# Patient Record
Sex: MALE | Race: WHITE | NOT HISPANIC OR LATINO | Employment: OTHER | URBAN - METROPOLITAN AREA
[De-identification: names, ages, dates, MRNs, and addresses within clinical notes are randomized per-mention and may not be internally consistent; named-entity substitution may affect disease eponyms.]

---

## 2018-02-01 ENCOUNTER — APPOINTMENT (OUTPATIENT)
Dept: GENERAL RADIOLOGY | Facility: HOSPITAL | Age: 73
End: 2018-02-01

## 2018-02-01 ENCOUNTER — HOSPITAL ENCOUNTER (INPATIENT)
Facility: HOSPITAL | Age: 73
LOS: 3 days | Discharge: HOME | End: 2018-02-05
Attending: EMERGENCY MEDICINE | Admitting: INTERNAL MEDICINE

## 2018-02-01 DIAGNOSIS — M62.81 MUSCLE WEAKNESS (GENERALIZED): ICD-10-CM

## 2018-02-01 DIAGNOSIS — R05.9 COUGH: ICD-10-CM

## 2018-02-01 DIAGNOSIS — R53.1 GENERALIZED WEAKNESS: Primary | ICD-10-CM

## 2018-02-01 LAB
ALBUMIN SERPL-MCNC: 4.5 G/DL (ref 3.5–5.2)
ALBUMIN/GLOB SERPL: 1.8 G/DL
ALP SERPL-CCNC: 85 U/L (ref 39–117)
ALT SERPL W P-5'-P-CCNC: 14 U/L (ref 1–41)
ANION GAP SERPL CALCULATED.3IONS-SCNC: 8.9 MMOL/L
AST SERPL-CCNC: 28 U/L (ref 1–40)
BASOPHILS # BLD AUTO: 0.01 10*3/MM3 (ref 0–0.2)
BASOPHILS NFR BLD AUTO: 0.1 % (ref 0–1.5)
BILIRUB SERPL-MCNC: 0.9 MG/DL (ref 0.1–1.2)
BILIRUB UR QL STRIP: NEGATIVE
BUN BLD-MCNC: 15 MG/DL (ref 8–23)
BUN/CREAT SERPL: 12 (ref 7–25)
CALCIUM SPEC-SCNC: 9.3 MG/DL (ref 8.6–10.5)
CHLORIDE SERPL-SCNC: 99 MMOL/L (ref 98–107)
CLARITY UR: CLEAR
CO2 SERPL-SCNC: 32.1 MMOL/L (ref 22–29)
COLOR UR: YELLOW
CREAT BLD-MCNC: 1.25 MG/DL (ref 0.76–1.27)
DEPRECATED RDW RBC AUTO: 44.9 FL (ref 37–54)
EOSINOPHIL # BLD AUTO: 0.07 10*3/MM3 (ref 0–0.7)
EOSINOPHIL NFR BLD AUTO: 0.8 % (ref 0.3–6.2)
ERYTHROCYTE [DISTWIDTH] IN BLOOD BY AUTOMATED COUNT: 13 % (ref 11.5–14.5)
FLUAV AG NPH QL: NEGATIVE
FLUBV AG NPH QL IA: NEGATIVE
GFR SERPL CREATININE-BSD FRML MDRD: 57 ML/MIN/1.73
GLOBULIN UR ELPH-MCNC: 2.5 GM/DL
GLUCOSE BLD-MCNC: 99 MG/DL (ref 65–99)
GLUCOSE UR STRIP-MCNC: NEGATIVE MG/DL
HCT VFR BLD AUTO: 40 % (ref 40.4–52.2)
HGB BLD-MCNC: 13.8 G/DL (ref 13.7–17.6)
HGB UR QL STRIP.AUTO: NEGATIVE
IMM GRANULOCYTES # BLD: 0.02 10*3/MM3 (ref 0–0.03)
IMM GRANULOCYTES NFR BLD: 0.2 % (ref 0–0.5)
KETONES UR QL STRIP: NEGATIVE
LEUKOCYTE ESTERASE UR QL STRIP.AUTO: NEGATIVE
LYMPHOCYTES # BLD AUTO: 0.47 10*3/MM3 (ref 0.9–4.8)
LYMPHOCYTES NFR BLD AUTO: 5.1 % (ref 19.6–45.3)
MCH RBC QN AUTO: 32.9 PG (ref 27–32.7)
MCHC RBC AUTO-ENTMCNC: 34.5 G/DL (ref 32.6–36.4)
MCV RBC AUTO: 95.5 FL (ref 79.8–96.2)
MONOCYTES # BLD AUTO: 0.72 10*3/MM3 (ref 0.2–1.2)
MONOCYTES NFR BLD AUTO: 7.8 % (ref 5–12)
NEUTROPHILS # BLD AUTO: 8 10*3/MM3 (ref 1.9–8.1)
NEUTROPHILS NFR BLD AUTO: 86 % (ref 42.7–76)
NITRITE UR QL STRIP: NEGATIVE
PH UR STRIP.AUTO: 5.5 [PH] (ref 5–8)
PLATELET # BLD AUTO: 136 10*3/MM3 (ref 140–500)
PMV BLD AUTO: 9.5 FL (ref 6–12)
POTASSIUM BLD-SCNC: 3.3 MMOL/L (ref 3.5–5.2)
PROT SERPL-MCNC: 7 G/DL (ref 6–8.5)
PROT UR QL STRIP: NEGATIVE
RBC # BLD AUTO: 4.19 10*6/MM3 (ref 4.6–6)
S PYO AG THROAT QL: NEGATIVE
SODIUM BLD-SCNC: 140 MMOL/L (ref 136–145)
SP GR UR STRIP: 1.02 (ref 1–1.03)
UROBILINOGEN UR QL STRIP: NORMAL
WBC NRBC COR # BLD: 9.29 10*3/MM3 (ref 4.5–10.7)

## 2018-02-01 PROCEDURE — 71046 X-RAY EXAM CHEST 2 VIEWS: CPT

## 2018-02-01 PROCEDURE — 87880 STREP A ASSAY W/OPTIC: CPT | Performed by: PHYSICIAN ASSISTANT

## 2018-02-01 PROCEDURE — 87581 M.PNEUMON DNA AMP PROBE: CPT | Performed by: PHYSICIAN ASSISTANT

## 2018-02-01 PROCEDURE — 80053 COMPREHEN METABOLIC PANEL: CPT | Performed by: PHYSICIAN ASSISTANT

## 2018-02-01 PROCEDURE — 94640 AIRWAY INHALATION TREATMENT: CPT

## 2018-02-01 PROCEDURE — 87486 CHLMYD PNEUM DNA AMP PROBE: CPT | Performed by: PHYSICIAN ASSISTANT

## 2018-02-01 PROCEDURE — 85025 COMPLETE CBC W/AUTO DIFF WBC: CPT | Performed by: PHYSICIAN ASSISTANT

## 2018-02-01 PROCEDURE — 87633 RESP VIRUS 12-25 TARGETS: CPT | Performed by: PHYSICIAN ASSISTANT

## 2018-02-01 PROCEDURE — 87081 CULTURE SCREEN ONLY: CPT | Performed by: PHYSICIAN ASSISTANT

## 2018-02-01 PROCEDURE — 87798 DETECT AGENT NOS DNA AMP: CPT | Performed by: PHYSICIAN ASSISTANT

## 2018-02-01 PROCEDURE — 99285 EMERGENCY DEPT VISIT HI MDM: CPT

## 2018-02-01 PROCEDURE — G0378 HOSPITAL OBSERVATION PER HR: HCPCS

## 2018-02-01 PROCEDURE — 87804 INFLUENZA ASSAY W/OPTIC: CPT | Performed by: PHYSICIAN ASSISTANT

## 2018-02-01 PROCEDURE — 94799 UNLISTED PULMONARY SVC/PX: CPT

## 2018-02-01 PROCEDURE — 81003 URINALYSIS AUTO W/O SCOPE: CPT | Performed by: PHYSICIAN ASSISTANT

## 2018-02-01 RX ORDER — PRAMIPEXOLE DIHYDROCHLORIDE 0.75 MG/1
0.7 TABLET ORAL 3 TIMES DAILY
COMMUNITY

## 2018-02-01 RX ORDER — ATORVASTATIN CALCIUM 20 MG/1
20 TABLET, FILM COATED ORAL DAILY
COMMUNITY

## 2018-02-01 RX ORDER — VALSARTAN AND HYDROCHLOROTHIAZIDE 160; 12.5 MG/1; MG/1
1 TABLET, FILM COATED ORAL DAILY
COMMUNITY
End: 2021-11-15 | Stop reason: HOSPADM

## 2018-02-01 RX ORDER — ACETAMINOPHEN 325 MG/1
650 TABLET ORAL ONCE
Status: COMPLETED | OUTPATIENT
Start: 2018-02-01 | End: 2018-02-01

## 2018-02-01 RX ORDER — ASCORBIC ACID 500 MG
500 TABLET ORAL DAILY
COMMUNITY

## 2018-02-01 RX ORDER — ASPIRIN 81 MG/1
81 TABLET ORAL DAILY
COMMUNITY

## 2018-02-01 RX ORDER — OMEPRAZOLE 20 MG/1
20 CAPSULE, DELAYED RELEASE ORAL DAILY
COMMUNITY

## 2018-02-01 RX ORDER — RIVASTIGMINE TARTRATE 1.5 MG/1
6 CAPSULE ORAL ONCE
Status: COMPLETED | OUTPATIENT
Start: 2018-02-01 | End: 2018-02-01

## 2018-02-01 RX ORDER — RIVASTIGMINE TARTRATE 6 MG/1
6 CAPSULE ORAL 2 TIMES DAILY
COMMUNITY

## 2018-02-01 RX ORDER — ALBUTEROL SULFATE 2.5 MG/3ML
2.5 SOLUTION RESPIRATORY (INHALATION) ONCE
Status: COMPLETED | OUTPATIENT
Start: 2018-02-01 | End: 2018-02-01

## 2018-02-01 RX ORDER — MEMANTINE HYDROCHLORIDE 28 MG/1
10 CAPSULE, EXTENDED RELEASE ORAL 2 TIMES DAILY
COMMUNITY
End: 2022-01-14

## 2018-02-01 RX ORDER — PRAMIPEXOLE DIHYDROCHLORIDE 1 MG/1
1 TABLET ORAL ONCE
Status: COMPLETED | OUTPATIENT
Start: 2018-02-01 | End: 2018-02-01

## 2018-02-01 RX ADMIN — RIVASTIGMINE TARTRATE 6 MG: 1.5 CAPSULE ORAL at 22:20

## 2018-02-01 RX ADMIN — PRAMIPEXOLE DIHYDROCHLORIDE 1 MG: 1 TABLET ORAL at 22:19

## 2018-02-01 RX ADMIN — SODIUM CHLORIDE 1000 ML: 9 INJECTION, SOLUTION INTRAVENOUS at 20:18

## 2018-02-01 RX ADMIN — ALBUTEROL SULFATE 2.5 MG: 2.5 SOLUTION RESPIRATORY (INHALATION) at 22:14

## 2018-02-01 RX ADMIN — ACETAMINOPHEN 650 MG: 325 TABLET ORAL at 20:42

## 2018-02-02 PROBLEM — G20 PARKINSON'S DISEASE: Status: ACTIVE | Noted: 2018-02-02

## 2018-02-02 PROBLEM — R50.9 FEBRILE ILLNESS: Status: ACTIVE | Noted: 2018-02-02

## 2018-02-02 PROBLEM — I10 ESSENTIAL HYPERTENSION: Status: ACTIVE | Noted: 2018-02-02

## 2018-02-02 PROBLEM — J10.1 INFLUENZA A: Status: ACTIVE | Noted: 2018-02-02

## 2018-02-02 LAB
ANION GAP SERPL CALCULATED.3IONS-SCNC: 13.5 MMOL/L
B PERT DNA SPEC QL NAA+PROBE: NOT DETECTED
BASOPHILS # BLD AUTO: 0.01 10*3/MM3 (ref 0–0.2)
BASOPHILS NFR BLD AUTO: 0.2 % (ref 0–1.5)
BUN BLD-MCNC: 13 MG/DL (ref 8–23)
BUN/CREAT SERPL: 12 (ref 7–25)
C PNEUM DNA NPH QL NAA+NON-PROBE: NOT DETECTED
CALCIUM SPEC-SCNC: 8.4 MG/DL (ref 8.6–10.5)
CHLORIDE SERPL-SCNC: 101 MMOL/L (ref 98–107)
CO2 SERPL-SCNC: 25.5 MMOL/L (ref 22–29)
CREAT BLD-MCNC: 1.08 MG/DL (ref 0.76–1.27)
DEPRECATED RDW RBC AUTO: 44.8 FL (ref 37–54)
EOSINOPHIL # BLD AUTO: 0.03 10*3/MM3 (ref 0–0.7)
EOSINOPHIL NFR BLD AUTO: 0.5 % (ref 0.3–6.2)
ERYTHROCYTE [DISTWIDTH] IN BLOOD BY AUTOMATED COUNT: 12.9 % (ref 11.5–14.5)
FLUAV H1 2009 PAND RNA NPH QL NAA+PROBE: NOT DETECTED
FLUAV H1 HA GENE NPH QL NAA+PROBE: NOT DETECTED
FLUAV H3 RNA NPH QL NAA+PROBE: DETECTED
FLUAV SUBTYP SPEC NAA+PROBE: NOT DETECTED
FLUBV RNA ISLT QL NAA+PROBE: NOT DETECTED
GFR SERPL CREATININE-BSD FRML MDRD: 67 ML/MIN/1.73
GLUCOSE BLD-MCNC: 100 MG/DL (ref 65–99)
HADV DNA SPEC NAA+PROBE: NOT DETECTED
HCOV 229E RNA SPEC QL NAA+PROBE: NOT DETECTED
HCOV HKU1 RNA SPEC QL NAA+PROBE: NOT DETECTED
HCOV NL63 RNA SPEC QL NAA+PROBE: NOT DETECTED
HCOV OC43 RNA SPEC QL NAA+PROBE: NOT DETECTED
HCT VFR BLD AUTO: 37.2 % (ref 40.4–52.2)
HGB BLD-MCNC: 12.7 G/DL (ref 13.7–17.6)
HMPV RNA NPH QL NAA+NON-PROBE: NOT DETECTED
HPIV1 RNA SPEC QL NAA+PROBE: NOT DETECTED
HPIV2 RNA SPEC QL NAA+PROBE: NOT DETECTED
HPIV3 RNA NPH QL NAA+PROBE: NOT DETECTED
HPIV4 P GENE NPH QL NAA+PROBE: NOT DETECTED
IMM GRANULOCYTES # BLD: 0 10*3/MM3 (ref 0–0.03)
IMM GRANULOCYTES NFR BLD: 0 % (ref 0–0.5)
LYMPHOCYTES # BLD AUTO: 0.74 10*3/MM3 (ref 0.9–4.8)
LYMPHOCYTES NFR BLD AUTO: 12.3 % (ref 19.6–45.3)
M PNEUMO IGG SER IA-ACNC: NOT DETECTED
MAGNESIUM SERPL-MCNC: 1.8 MG/DL (ref 1.6–2.4)
MCH RBC QN AUTO: 32.7 PG (ref 27–32.7)
MCHC RBC AUTO-ENTMCNC: 34.1 G/DL (ref 32.6–36.4)
MCV RBC AUTO: 95.9 FL (ref 79.8–96.2)
MONOCYTES # BLD AUTO: 0.69 10*3/MM3 (ref 0.2–1.2)
MONOCYTES NFR BLD AUTO: 11.5 % (ref 5–12)
NEUTROPHILS # BLD AUTO: 4.54 10*3/MM3 (ref 1.9–8.1)
NEUTROPHILS NFR BLD AUTO: 75.5 % (ref 42.7–76)
PLATELET # BLD AUTO: 102 10*3/MM3 (ref 140–500)
PMV BLD AUTO: 9 FL (ref 6–12)
POTASSIUM BLD-SCNC: 3 MMOL/L (ref 3.5–5.2)
RBC # BLD AUTO: 3.88 10*6/MM3 (ref 4.6–6)
RHINOVIRUS RNA SPEC NAA+PROBE: NOT DETECTED
RSV RNA NPH QL NAA+NON-PROBE: NOT DETECTED
SODIUM BLD-SCNC: 140 MMOL/L (ref 136–145)
WBC NRBC COR # BLD: 6.01 10*3/MM3 (ref 4.5–10.7)

## 2018-02-02 PROCEDURE — 85025 COMPLETE CBC W/AUTO DIFF WBC: CPT | Performed by: INTERNAL MEDICINE

## 2018-02-02 PROCEDURE — 25010000002 ENOXAPARIN PER 10 MG: Performed by: INTERNAL MEDICINE

## 2018-02-02 PROCEDURE — 25810000003 SODIUM CHLORIDE 0.9 % WITH KCL 20 MEQ 20-0.9 MEQ/L-% SOLUTION: Performed by: INTERNAL MEDICINE

## 2018-02-02 PROCEDURE — 97161 PT EVAL LOW COMPLEX 20 MIN: CPT

## 2018-02-02 PROCEDURE — 80048 BASIC METABOLIC PNL TOTAL CA: CPT | Performed by: INTERNAL MEDICINE

## 2018-02-02 PROCEDURE — 97110 THERAPEUTIC EXERCISES: CPT

## 2018-02-02 PROCEDURE — 83735 ASSAY OF MAGNESIUM: CPT | Performed by: INTERNAL MEDICINE

## 2018-02-02 RX ORDER — ALBUTEROL SULFATE 2.5 MG/3ML
2.5 SOLUTION RESPIRATORY (INHALATION) EVERY 6 HOURS PRN
Status: DISCONTINUED | OUTPATIENT
Start: 2018-02-02 | End: 2018-02-05 | Stop reason: HOSPADM

## 2018-02-02 RX ORDER — OSELTAMIVIR PHOSPHATE 30 MG/1
30 CAPSULE ORAL EVERY 12 HOURS SCHEDULED
Status: DISCONTINUED | OUTPATIENT
Start: 2018-02-02 | End: 2018-02-03

## 2018-02-02 RX ORDER — MEMANTINE HYDROCHLORIDE 10 MG/1
10 TABLET ORAL EVERY 12 HOURS SCHEDULED
Status: DISCONTINUED | OUTPATIENT
Start: 2018-02-02 | End: 2018-02-05 | Stop reason: HOSPADM

## 2018-02-02 RX ORDER — ATORVASTATIN CALCIUM 20 MG/1
20 TABLET, FILM COATED ORAL DAILY
Status: DISCONTINUED | OUTPATIENT
Start: 2018-02-02 | End: 2018-02-05 | Stop reason: HOSPADM

## 2018-02-02 RX ORDER — MULTIPLE VITAMINS W/ MINERALS TAB 9MG-400MCG
1 TAB ORAL DAILY
Status: DISCONTINUED | OUTPATIENT
Start: 2018-02-02 | End: 2018-02-05 | Stop reason: HOSPADM

## 2018-02-02 RX ORDER — RIVASTIGMINE TARTRATE 3 MG/1
6 CAPSULE ORAL 2 TIMES DAILY WITH MEALS
Status: DISCONTINUED | OUTPATIENT
Start: 2018-02-02 | End: 2018-02-05 | Stop reason: HOSPADM

## 2018-02-02 RX ORDER — ONDANSETRON 4 MG/1
4 TABLET, ORALLY DISINTEGRATING ORAL EVERY 6 HOURS PRN
Status: DISCONTINUED | OUTPATIENT
Start: 2018-02-02 | End: 2018-02-05 | Stop reason: HOSPADM

## 2018-02-02 RX ORDER — SODIUM CHLORIDE AND POTASSIUM CHLORIDE 150; 900 MG/100ML; MG/100ML
75 INJECTION, SOLUTION INTRAVENOUS CONTINUOUS
Status: DISCONTINUED | OUTPATIENT
Start: 2018-02-02 | End: 2018-02-03

## 2018-02-02 RX ORDER — ASCORBIC ACID 500 MG
500 TABLET ORAL DAILY
Status: DISCONTINUED | OUTPATIENT
Start: 2018-02-02 | End: 2018-02-05 | Stop reason: HOSPADM

## 2018-02-02 RX ORDER — VALSARTAN 160 MG/1
160 TABLET ORAL
Status: DISCONTINUED | OUTPATIENT
Start: 2018-02-02 | End: 2018-02-05 | Stop reason: HOSPADM

## 2018-02-02 RX ORDER — ONDANSETRON 4 MG/1
4 TABLET, FILM COATED ORAL EVERY 6 HOURS PRN
Status: DISCONTINUED | OUTPATIENT
Start: 2018-02-02 | End: 2018-02-05 | Stop reason: HOSPADM

## 2018-02-02 RX ORDER — POTASSIUM CHLORIDE 750 MG/1
40 CAPSULE, EXTENDED RELEASE ORAL 2 TIMES DAILY WITH MEALS
Status: DISCONTINUED | OUTPATIENT
Start: 2018-02-02 | End: 2018-02-05 | Stop reason: HOSPADM

## 2018-02-02 RX ORDER — ASPIRIN 81 MG/1
81 TABLET ORAL DAILY
Status: DISCONTINUED | OUTPATIENT
Start: 2018-02-02 | End: 2018-02-05 | Stop reason: HOSPADM

## 2018-02-02 RX ORDER — SENNA AND DOCUSATE SODIUM 50; 8.6 MG/1; MG/1
2 TABLET, FILM COATED ORAL NIGHTLY PRN
Status: DISCONTINUED | OUTPATIENT
Start: 2018-02-02 | End: 2018-02-05 | Stop reason: HOSPADM

## 2018-02-02 RX ORDER — HYDROCODONE BITARTRATE AND ACETAMINOPHEN 5; 325 MG/1; MG/1
1 TABLET ORAL EVERY 4 HOURS PRN
Status: DISCONTINUED | OUTPATIENT
Start: 2018-02-02 | End: 2018-02-05 | Stop reason: HOSPADM

## 2018-02-02 RX ORDER — SODIUM CHLORIDE 0.9 % (FLUSH) 0.9 %
1-10 SYRINGE (ML) INJECTION AS NEEDED
Status: DISCONTINUED | OUTPATIENT
Start: 2018-02-02 | End: 2018-02-05 | Stop reason: HOSPADM

## 2018-02-02 RX ORDER — HYDROCHLOROTHIAZIDE 25 MG/1
12.5 TABLET ORAL DAILY
Status: DISCONTINUED | OUTPATIENT
Start: 2018-02-02 | End: 2018-02-05 | Stop reason: HOSPADM

## 2018-02-02 RX ORDER — PRAMIPEXOLE DIHYDROCHLORIDE 1 MG/1
1 TABLET ORAL 3 TIMES DAILY
Status: DISCONTINUED | OUTPATIENT
Start: 2018-02-02 | End: 2018-02-05 | Stop reason: HOSPADM

## 2018-02-02 RX ORDER — ONDANSETRON 2 MG/ML
4 INJECTION INTRAMUSCULAR; INTRAVENOUS EVERY 6 HOURS PRN
Status: DISCONTINUED | OUTPATIENT
Start: 2018-02-02 | End: 2018-02-05 | Stop reason: HOSPADM

## 2018-02-02 RX ORDER — ACETAMINOPHEN 325 MG/1
650 TABLET ORAL EVERY 4 HOURS PRN
Status: DISCONTINUED | OUTPATIENT
Start: 2018-02-02 | End: 2018-02-05 | Stop reason: HOSPADM

## 2018-02-02 RX ORDER — PANTOPRAZOLE SODIUM 40 MG/1
40 TABLET, DELAYED RELEASE ORAL EVERY MORNING
Status: DISCONTINUED | OUTPATIENT
Start: 2018-02-02 | End: 2018-02-05 | Stop reason: HOSPADM

## 2018-02-02 RX ORDER — VALSARTAN AND HYDROCHLOROTHIAZIDE 160; 12.5 MG/1; MG/1
1 TABLET, FILM COATED ORAL DAILY
Status: DISCONTINUED | OUTPATIENT
Start: 2018-02-02 | End: 2018-02-02 | Stop reason: CLARIF

## 2018-02-02 RX ADMIN — POTASSIUM CHLORIDE 40 MEQ: 750 CAPSULE, EXTENDED RELEASE ORAL at 19:10

## 2018-02-02 RX ADMIN — RIVASTIGMINE TARTRATE 6 MG: 3 CAPSULE ORAL at 17:19

## 2018-02-02 RX ADMIN — MULTIPLE VITAMINS W/ MINERALS TAB 1 TABLET: TAB at 08:40

## 2018-02-02 RX ADMIN — OSELTAMIVIR PHOSPHATE 30 MG: 30 CAPSULE ORAL at 12:18

## 2018-02-02 RX ADMIN — MEMANTINE HYDROCHLORIDE 10 MG: 10 TABLET, FILM COATED ORAL at 12:18

## 2018-02-02 RX ADMIN — RIVASTIGMINE TARTRATE 6 MG: 3 CAPSULE ORAL at 08:41

## 2018-02-02 RX ADMIN — OSELTAMIVIR PHOSPHATE 30 MG: 30 CAPSULE ORAL at 03:26

## 2018-02-02 RX ADMIN — MEMANTINE HYDROCHLORIDE 10 MG: 10 TABLET, FILM COATED ORAL at 03:25

## 2018-02-02 RX ADMIN — ASPIRIN 81 MG: 81 TABLET ORAL at 08:40

## 2018-02-02 RX ADMIN — ENOXAPARIN SODIUM 40 MG: 40 INJECTION SUBCUTANEOUS at 21:24

## 2018-02-02 RX ADMIN — OSELTAMIVIR PHOSPHATE 30 MG: 30 CAPSULE ORAL at 21:24

## 2018-02-02 RX ADMIN — POTASSIUM CHLORIDE 40 MEQ: 750 CAPSULE, EXTENDED RELEASE ORAL at 12:18

## 2018-02-02 RX ADMIN — ACETAMINOPHEN 650 MG: 325 TABLET, FILM COATED ORAL at 06:21

## 2018-02-02 RX ADMIN — PRAMIPEXOLE DIHYDROCHLORIDE 1 MG: 1 TABLET ORAL at 17:19

## 2018-02-02 RX ADMIN — POTASSIUM CHLORIDE AND SODIUM CHLORIDE 75 ML/HR: 900; 150 INJECTION, SOLUTION INTRAVENOUS at 15:31

## 2018-02-02 RX ADMIN — ENOXAPARIN SODIUM 40 MG: 40 INJECTION SUBCUTANEOUS at 03:25

## 2018-02-02 RX ADMIN — PRAMIPEXOLE DIHYDROCHLORIDE 1 MG: 1 TABLET ORAL at 08:40

## 2018-02-02 RX ADMIN — PANTOPRAZOLE SODIUM 40 MG: 40 TABLET, DELAYED RELEASE ORAL at 06:21

## 2018-02-02 RX ADMIN — MEMANTINE HYDROCHLORIDE 10 MG: 10 TABLET, FILM COATED ORAL at 21:24

## 2018-02-02 RX ADMIN — OXYCODONE HYDROCHLORIDE AND ACETAMINOPHEN 500 MG: 500 TABLET ORAL at 08:41

## 2018-02-02 RX ADMIN — HYDROCHLOROTHIAZIDE 12.5 MG: 25 TABLET ORAL at 08:40

## 2018-02-02 RX ADMIN — PRAMIPEXOLE DIHYDROCHLORIDE 1 MG: 1 TABLET ORAL at 21:24

## 2018-02-02 RX ADMIN — POTASSIUM CHLORIDE AND SODIUM CHLORIDE 75 ML/HR: 900; 150 INJECTION, SOLUTION INTRAVENOUS at 01:16

## 2018-02-02 RX ADMIN — VALSARTAN 160 MG: 160 TABLET ORAL at 08:40

## 2018-02-02 RX ADMIN — ATORVASTATIN CALCIUM 20 MG: 20 TABLET, FILM COATED ORAL at 08:40

## 2018-02-02 NOTE — H&P
Name: Rogelio Sher ADMIT: 2018   : 1945  PCP: Naeem Garcia MD    MRN: [id number] LOS: 0 days   AGE/SEX: 72 y.o. male  ROOM: P387/     Chief Complaint   Patient presents with   • Sore Throat   • Cough   • Fever       Subjective   Mr. Sher is a 72 y.o. male with a history of Parkinson's disease who presents to [facility] with worsening weakness and confusion from his AL. He was found to have fever in ER so has undergone infectious workup. He reports some sore/dry throat but no nasal congestion or dyspnea/cough. No CP or palpitations. No NVD or abdominal pain. No dysuria or difficulty urinating. His weakness is not focal and tremor is near baseline. Family was not in room to confirm his mental status baseline but nursing had dicussed with them and his mentation improved greatly after IVF bolus was given upon arrival.    History of Present Illness    Past Medical History:   Diagnosis Date   • Chorioretinitis     histoplasmosis left eye   • GERD (gastroesophageal reflux disease)    • Hyperlipidemia    • Hypertension    • Kidney stone    • Parkinson's disease      Past Surgical History:   Procedure Laterality Date   • CATARACT EXTRACTION, BILATERAL     • COLONOSCOPY     • KIDNEY STONE SURGERY     • TONSILLECTOMY       History reviewed. No pertinent family history.  Social History   Substance Use Topics   • Smoking status: Never Smoker   • Smokeless tobacco: Never Used   • Alcohol use Yes      Comment: 3 drinks weekly     Prescriptions Prior to Admission   Medication Sig Dispense Refill Last Dose   • aspirin 81 MG EC tablet Take 81 mg by mouth Daily.   2018 at Unknown time   • atorvastatin (LIPITOR) 20 MG tablet Take 20 mg by mouth Daily.   2018 at Unknown time   • memantine (NAMENDA XR) 28 MG capsule sustained-release 24 hr extended release capsule Take 28 mg by mouth Daily.   2018 at Unknown time   • Multiple Vitamins-Minerals (MULTIVITAL PO) Take 1 tablet by mouth  Daily.   2/1/2018 at Unknown time   • omeprazole (priLOSEC) 20 MG capsule Take 20 mg by mouth Daily.   2/1/2018 at Unknown time   • pramipexole (MIRAPEX) 1 MG tablet Take 1 mg by mouth 3 (Three) Times a Day.   2/1/2018 at 2220   • rivastigmine (EXELON) 6 MG capsule Take 6 mg by mouth 2 (Two) Times a Day.   2/1/2018 at 2220   • valsartan-hydrochlorothiazide (DIOVAN-HCT) 160-12.5 MG per tablet Take 1 tablet by mouth Daily.      • vitamin C (ASCORBIC ACID) 500 MG tablet Take 500 mg by mouth Daily.        Allergies:  No Known Allergies    Review of Systems   Constitutional: Negative for chills and fever.   HENT: Positive for sore throat. Negative for trouble swallowing.    Eyes: Negative for pain and visual disturbance.   Respiratory: Negative for cough, shortness of breath and wheezing.    Cardiovascular: Negative for chest pain, palpitations and leg swelling.   Gastrointestinal: Negative for constipation, diarrhea, nausea and vomiting.   Endocrine: Negative for cold intolerance and heat intolerance.   Genitourinary: Negative for difficulty urinating and dysuria.   Musculoskeletal: Negative for neck pain and neck stiffness.   Skin: Negative for pallor and rash.   Allergic/Immunologic: Negative for environmental allergies and food allergies.   Neurological: Negative for seizures and syncope.   Hematological: Negative for adenopathy.   Psychiatric/Behavioral: Positive for confusion. Negative for agitation.        Objective    Vital Signs  Temp:  [97.7 °F (36.5 °C)-100.3 °F (37.9 °C)] 97.7 °F (36.5 °C)  Heart Rate:  [103-116] 104  Resp:  [18-24] 24  BP: (131-170)/(71-99) 139/71  SpO2:  [91 %-96 %] 91 %  on   ;   O2 Device: room air  Body mass index is 33.42 kg/(m^2).    Physical Exam   Constitutional: He appears well-developed and well-nourished. No distress.   HENT:   Head: Normocephalic and atraumatic.   Nose: Nose normal.   Eyes: EOM are normal. Pupils are equal, round, and reactive to light. No scleral icterus.    Neck: Normal range of motion. Neck supple.   Cardiovascular: Normal rate, regular rhythm and intact distal pulses.    Pulmonary/Chest: Effort normal. He has no wheezes. He has no rales.   Abdominal: Soft. Bowel sounds are normal. He exhibits no distension. There is no tenderness.   Musculoskeletal: He exhibits no edema or tenderness.   Neurological: He is alert. He displays tremor. No cranial nerve deficit.   Skin: Skin is warm and dry. He is not diaphoretic.   Psychiatric: He has a normal mood and affect. His behavior is normal.   Nursing note and vitals reviewed.      Results Review:   I reviewed the patient's new clinical results. Reviewed imaging, agree with interpretation. Reviewed telemetry, sinus rhythm, poor baseline related to tremor. Reviewed prior records.    Lab Results (last 24 hours)     Procedure Component Value Units Date/Time    Influenza Antigen, Rapid - Swab, Nasopharynx [[id number]]  (Normal) Collected:  02/01/18 2016    Specimen:  Swab from Nasopharynx Updated:  02/01/18 2041     Influenza A Ag, EIA Negative     Influenza B Ag, EIA Negative    CBC & Differential [[id number]] Collected:  02/01/18 2016    Specimen:  Blood Updated:  02/01/18 2032    Narrative:       The following orders were created for panel order CBC & Differential.  Procedure                               Abnormality         Status                     ---------                               -----------         ------                     CBC Auto Differential[[id number]]         Abnormal            Final result                 Please view results for these tests on the individual orders.    Comprehensive Metabolic Panel [[id number]]  (Abnormal) Collected:  02/01/18 2016    Specimen:  Blood Updated:  02/01/18 2055     Glucose 99 mg/dL      BUN 15 mg/dL      Creatinine 1.25 mg/dL      Sodium 140 mmol/L      Potassium 3.3 (L) mmol/L      Chloride 99 mmol/L      CO2 32.1 (H) mmol/L      Calcium 9.3 mg/dL      Total Protein 7.0 g/dL       Albumin 4.50 g/dL      ALT (SGPT) 14 U/L      AST (SGOT) 28 U/L      Alkaline Phosphatase 85 U/L      Total Bilirubin 0.9 mg/dL      eGFR Non African Amer 57 (L) mL/min/1.73      Globulin 2.5 gm/dL      A/G Ratio 1.8 g/dL      BUN/Creatinine Ratio 12.0     Anion Gap 8.9 mmol/L     Narrative:       The MDRD GFR formula is only valid for adults with stable renal function between ages 18 and 70.    CBC Auto Differential [[id number]]  (Abnormal) Collected:  02/01/18 2016    Specimen:  Blood Updated:  02/01/18 2032     WBC 9.29 10*3/mm3      RBC 4.19 (L) 10*6/mm3      Hemoglobin 13.8 g/dL      Hematocrit 40.0 (L) %      MCV 95.5 fL      MCH 32.9 (H) pg      MCHC 34.5 g/dL      RDW 13.0 %      RDW-SD 44.9 fl      MPV 9.5 fL      Platelets 136 (L) 10*3/mm3      Neutrophil % 86.0 (H) %      Lymphocyte % 5.1 (L) %      Monocyte % 7.8 %      Eosinophil % 0.8 %      Basophil % 0.1 %      Immature Grans % 0.2 %      Neutrophils, Absolute 8.00 10*3/mm3      Lymphocytes, Absolute 0.47 (L) 10*3/mm3      Monocytes, Absolute 0.72 10*3/mm3      Eosinophils, Absolute 0.07 10*3/mm3      Basophils, Absolute 0.01 10*3/mm3      Immature Grans, Absolute 0.02 10*3/mm3     Respiratory Panel, PCR - Swab, Nasopharynx [[id number]]  (Abnormal) Collected:  02/01/18 2016    Specimen:  Swab from Nasopharynx Updated:  02/02/18 0018     ADENOVIRUS, PCR Not Detected     Coronavirus 229E Not Detected     Coronavirus HKU1 Not Detected     Coronavirus NL63 Not Detected     Coronavirus OC43 Not Detected     Human Metapneumovirus Not Detected     Human Rhinovirus/Enterovirus Not Detected     Influenza B PCR Not Detected     Parainfluenza Virus 1 Not Detected     Parainfluenza Virus 2 Not Detected     Parainfluenza Virus 3 Not Detected     Parainfluenza Virus 4 Not Detected     Bordetella pertussis pcr Not Detected     Influenza A H1N1 2009 PCR Not Detected     Chlamydophila pneumoniae PCR Not Detected     Mycoplasma pneumo by PCR Not Detected     Influenza A  PCR Not Detected     Influenza A H3 Detected (A)     Influenza A H1 Not Detected     RSV, PCR Not Detected    Rapid Strep A Screen - Swab, Throat [[id number]]  (Normal) Collected:  02/01/18 2017    Specimen:  Swab from Throat Updated:  02/01/18 2041     Strep A Ag Negative    Beta Strep Culture, Throat - Swab, Throat [[id number]] Collected:  02/01/18 2017    Specimen:  Swab from Throat Updated:  02/01/18 2041    Urinalysis With / Culture If Indicated - Urine, Clean Catch [[id number]]  (Normal) Collected:  02/01/18 2122    Specimen:  Urine from Urine, Clean Catch Updated:  02/01/18 2131     Color, UA Yellow     Appearance, UA Clear     pH, UA 5.5     Specific Gravity, UA 1.022     Glucose, UA Negative     Ketones, UA Negative     Bilirubin, UA Negative     Blood, UA Negative     Protein, UA Negative     Leuk Esterase, UA Negative     Nitrite, UA Negative     Urobilinogen, UA 1.0 E.U./dL    Narrative:       Urine microscopic not indicated.          XR Chest 2 View   Final Result        Assessment/Plan   Active Hospital Problems (** Indicates Principal Problem)    Diagnosis Date Noted   • **Influenza A [J10.1] 02/02/2018   • Parkinson's disease [G20] 02/02/2018   • Essential hypertension [I10] 02/02/2018   • Generalized weakness [R53.1] 02/01/2018      Resolved Hospital Problems    Diagnosis Date Noted Date Resolved   No resolved problems to display.     - Influenza A: Febrile illness from this with reported sore throat. Infectious workup is pending but respiratory status is not compromised at this time. Will give Tamiflu and PRN albuterol. Continue IVF with addition of potassium. Check labs in morning.  - Parkinson's Dementia: Mentation is returning to baseline. Metabolic encephalopathy from Influenza and dehydration. Continue home regimen.  - Weakness: PT consult.  - PPx Lovenox    I discussed the patients findings and my recommendations with patient, nursing staff and consulting provider.  Patient seen and examined prior  to midnight on 02/01. Note delayed by other patient care.    Nehemias Sheriff MD  [facility]  02/02/18  12:37 AM

## 2018-02-02 NOTE — THERAPY EVALUATION
Acute Care - Physical Therapy Initial Evaluation  [facility]     Patient Name: Rogelio Sher  : 1945  MRN: [id number]  Today's Date: 2018   Onset of Illness/Injury or Date of Surgery Date: 18  Date of Referral to PT: 18  Referring Physician: Nehemias Bowen      Admit Date: 2018     Visit Dx:    ICD-10-CM ICD-9-CM   1. Generalized weakness R53.1 780.79   2. Cough R05 786.2   3. Muscle weakness (generalized) M62.81 728.87     Patient Active Problem List   Diagnosis   • Generalized weakness   • Parkinson's disease   • Essential hypertension   • Influenza A     Past Medical History:   Diagnosis Date   • Chorioretinitis     histoplasmosis left eye   • GERD (gastroesophageal reflux disease)    • Hyperlipidemia    • Hypertension    • Kidney stone    • Parkinson's disease      Past Surgical History:   Procedure Laterality Date   • CATARACT EXTRACTION, BILATERAL     • COLONOSCOPY     • KIDNEY STONE SURGERY     • TONSILLECTOMY            PT ASSESSMENT (last 72 hours)      PT Evaluation       18 1332 18 0200    Rehab Evaluation    Document Type evaluation  -MS     Subjective Information agree to therapy;complains of;fatigue  -MS     Patient Effort, Rehab Treatment good  -MS     Symptoms Noted Comment Pt. reports fatigue, weakness with upright mobility.    -MS     General Information    Onset of Illness/Injury or Date of Surgery Date 18  -MS     Referring Physician Nehemias Bowen  -MS     General Observations Upon entering room, noticed that pt.'s bed was wet from I.V. fluid. Pt.'s I.V. connection was disconnected and under patient while he was in bed. Nursing (Sharon) notified.  -MS     Pertinent History Of Current Problem Pt. admitted with Influenza  -MS     Precautions/Limitations fall precautions   Exit alarm;  Droplet Isolation  -MS     Prior Level of Function independent:  -MS     Equipment Currently Used at Home none  -MS grab bar  -ROSE    Plans/Goals Discussed  With patient;agreed upon  -MS     Risks Reviewed patient:  -MS     Benefits Reviewed patient:  -MS     Barriers to Rehab cognitive status  -MS     Living Environment    Lives With  facility resident  -ROSE    Living Arrangements assisted living  -MS residential facility  -ROSE    Home Accessibility  no concerns  -ROSE    Stair Railings at Home  none  -ROSE    Type of Financial/Environmental Concern  none  -ROSE    Transportation Available  family or friend will provide  -ROSE    Clinical Impression    Date of Referral to PT 02/02/18  -MS     Criteria for Skilled Therapeutic Interventions Met treatment indicated  -MS     Rehab Potential good, to achieve stated therapy goals  -MS     Pain Assessment    Pain Assessment No/denies pain   No verbal/visual signs of pain  -MS     Cognitive Assessment/Intervention    Orientation Status oriented to;person   Needs orientation to place  -MS     Follows Commands/Answers Questions 100% of the time;able to follow single-step instructions;needs cueing  -MS     Personal Safety Interventions fall prevention program maintained;gait belt;nonskid shoes/slippers when out of bed;supervised activity  -MS     ROM (Range of Motion)    General ROM no range of motion deficits identified  -MS     MMT (Manual Muscle Testing)    General MMT Assessment --   BUE/LE MMT (4-/5)  -MS     Bed Mobility, Assessment/Treatment    Bed Mob, Supine to Sit, Aldrich minimum assist (75% patient effort)  -MS     Transfer Assessment/Treatment    Transfers, Sit-Stand Aldrich contact guard assist  -MS     Transfers, Stand-Sit Aldrich contact guard assist  -MS     Transfer, Comment Performed x 2 for functional strength training.  -MS     Gait Assessment/Treatment    Gait, Aldrich Level contact guard assist  -MS     Gait, Distance (Feet) 200  -MS     Gait, Gait Deviations forward flexed posture;odalys decreased  -MS     Gait, Safety Issues balance decreased during turns  -MS     Gait, Comment Verbal/tactile  cues for posture correction.  -MS     Therapy Exercises    Bilateral Lower Extremities AROM:;5 reps;sitting;ankle pumps/circles;hip flexion;LAQ  -MS     Positioning and Restraints    Pre-Treatment Position in bed  -MS     Post Treatment Position chair  -MS     In Chair notified nsg;sitting;call light within reach;encouraged to call for assist;exit alarm on;with nsg   V.S.S.  -MS       02/02/18 0120       Muscle Tone Assessment    Muscle Tone Assessment Bilateral Upper Extremities;Bilateral Lower Extremities  -ROSE     Bilateral Upper Extremities Muscle Tone Assessment mildly decreased tone  -ROSE     Bilateral Lower Extremities Muscle Tone Assessment moderately decreased tone  -ROSE       User Key  (r) = Recorded By, (t) = Taken By, (c) = Cosigned By    Initials Name Provider Type    MS Isaac Atkinson, PT Physical Therapist    ROSE Norris RN Registered Nurse          Physical Therapy Education     Title: PT OT SLP Therapies (Done)     Topic: Physical Therapy (Done)     Point: Mobility training (Done)    Learning Progress Summary    Learner Readiness Method Response Comment Documented by Status   Patient Acceptance CELINE ROGERS NR  MS 02/02/18 1339 Done               Point: Home exercise program (Done)    Learning Progress Summary    Learner Readiness Method Response Comment Documented by Status   Patient Acceptance CELINE ROGERS NR  MS 02/02/18 1339 Done               Point: Body mechanics (Done)    Learning Progress Summary    Learner Readiness Method Response Comment Documented by Status   Patient Acceptance CELINE ROGERS NR  MS 02/02/18 1339 Done               Point: Precautions (Done)    Learning Progress Summary    Learner Readiness Method Response Comment Documented by Status   Patient Acceptance CELINE ROGERS NR  MS 02/02/18 1339 Done                      User Key     Initials Effective Dates Name Provider Type Discipline    MS 12/01/15 -  Isaac Atkinson, PT Physical Therapist PT                PT Recommendation and  Plan  Anticipated Discharge Disposition: home with assist, home with home health  Planned Therapy Interventions: balance training, bed mobility training, gait training, home exercise program, patient/family education, postural re-education, strengthening, transfer training  PT Frequency: daily  Plan of Care Review  Plan Of Care Reviewed With: patient  Outcome Summary/Follow up Plan: Pt. will benefit from skilled inpt. P.T. to address his functional deficits and to assist pt. in regaining his independence with functional mobility.          IP PT Goals       02/02/18 1339          Bed Mobility PT LTG    Bed Mobility PT LTG, Date Established 02/02/18  -MS      Bed Mobility PT LTG, Time to Achieve 5 - 7 days  -MS      Bed Mobility PT LTG, Activity Type all bed mobility  -MS      Bed Mobility PT LTG, Wilmot Level independent  -MS      Transfer Training PT LTG    Transfer Training PT LTG, Date Established 02/02/18  -MS      Transfer Training PT LTG, Time to Achieve 5 - 7 days  -MS      Transfer Training PT LTG, Activity Type all transfers  -MS      Transfer Training PT LTG, Wilmot Level independent  -MS      Gait Training PT LTG    Gait Training Goal PT LTG, Date Established 02/02/18  -MS      Gait Training Goal PT LTG, Time to Achieve 5 - 7 days  -MS      Gait Training Goal PT LTG, Wilmot Level independent  -MS      Gait Training Goal PT LTG, Distance to Achieve 300 feet  -MS        User Key  (r) = Recorded By, (t) = Taken By, (c) = Cosigned By    Initials Name Provider Type    MS Isaac RUVALCABA Demetrio, PT Physical Therapist                Outcome Measures       02/02/18 1300          How much help from another person do you currently need...    Turning from your back to your side while in flat bed without using bedrails? 3  -MS      Moving from lying on back to sitting on the side of a flat bed without bedrails? 3  -MS      Moving to and from a bed to a chair (including a wheelchair)? 3  -MS      Standing  up from a chair using your arms (e.g., wheelchair, bedside chair)? 3  -MS      Climbing 3-5 steps with a railing? 3  -MS      To walk in hospital room? 3  -MS      AM-PAC 6 Clicks Score 18  -MS      Functional Assessment    Outcome Measure Options AM-PAC 6 Clicks Basic Mobility (PT)  -MS        User Key  (r) = Recorded By, (t) = Taken By, (c) = Cosigned By    Initials Name Provider Type    MS Isaac Atkinson, PT Physical Therapist           Time Calculation:         PT Charges       02/02/18 1341          Time Calculation    Start Time 1315  -MS      Stop Time 1330  -MS      Time Calculation (min) 15 min  -MS      PT Received On 02/02/18  -MS      PT - Next Appointment 02/03/18  -MS      PT Goal Re-Cert Due Date 02/09/18  -MS        User Key  (r) = Recorded By, (t) = Taken By, (c) = Cosigned By    Initials Name Provider Type    MS Isaac Atkinson, PT Physical Therapist          Therapy Charges for Today     Code Description Service Date Service Provider Modifiers Qty    18056242153 HC PT EVAL LOW COMPLEXITY 1 2/2/2018 Isaac Atkinson, PT GP 1    78706305062 HC PT THER PROC EA 15 MIN 2/2/2018 Isaac Atkinson, PT GP 1          PT G-Codes  Outcome Measure Options: AM-PAC 6 Clicks Basic Mobility (PT)      Isaac Atkinson, PT  2/2/2018

## 2018-02-02 NOTE — PROGRESS NOTES
Discharge Planning Assessment  [facility]     Patient Name: Rogelio Sher  MRN: [id number]  Today's Date: 2/2/2018    Admit Date: 2/1/2018          Discharge Needs Assessment       02/02/18 1359    Living Environment    Lives With facility resident    Living Arrangements assisted living    Stair Railings at Home none    Type of Financial/Environmental Concern none    Transportation Available family or friend will provide    Living Environment    Provides Primary Care For no one    Quality Of Family Relationships supportive    Able to Return to Prior Living Arrangements yes    Discharge Needs Assessment    Concerns To Be Addressed no discharge needs identified    Anticipated Changes Related to Illness none    Equipment Currently Used at Home none    Equipment Needed After Discharge none    Discharge Disposition still a patient            Discharge Plan       02/02/18 1405    Case Management/Social Work Plan    Plan Return to [place] to assisted living apartment  MARLY Davis his daughter [phone]    Additional Comments IMM   02/02   Spoke to patient.  I introduced myself and explained CCP role.  Verified face sheet.  unable to confirm patient pharmacy.  He states he uses [facility].  Pt lives in assisted living apartment alone.  Pt needs assistance with some ADL's.  Called and message left for his daughter/MARLY Davis, [phone] to verify plan to return to [place] and to confirm pharmacy.  He has no history of Home Health.  He has no rehab history.  Plan is home with no identified needs.  CCP following.          Discharge Placement     No information found                Demographic Summary     None            Functional Status       02/02/18 1359    Functional Status Current    Ambulation 2-->assistive person    Transferring 2-->assistive person    Toileting 2-->assistive person    Bathing 2-->assistive person    Dressing 2-->assistive person    Eating 2-->assistive person    Communication  0-->understands/communicates without difficulty    Functional Status Prior    Ambulation 2-->assistive person    Transferring 2-->assistive person    Toileting 2-->assistive person    Bathing 2-->assistive person    Dressing 2-->assistive person    Eating 0-->independent            Psychosocial     None            Abuse/Neglect     None            Legal     None            Substance Abuse     None            Patient Forms     None          Mihaela Bush, RN

## 2018-02-02 NOTE — ED PROVIDER NOTES
EMERGENCY DEPARTMENT ENCOUNTER    CHIEF COMPLAINT  Chief Complaint: flu symptoms  History given by: patient's daughter  History limited by: nothing  Room Number: P387/1  PMD: Naeem Garcia MD      HPI:  Pt is a 72 y.o. male who presents with flu symptoms including sore throat, tachycardia, and cough. Pt's symptoms began earlier today with a cough. About two hours ago, Pt's senior living facility called and stated that he had a subjective fever, weakness, headache, sore throat, and his cough had worsened. Pt's daughter stated that she instructed the facility to bring him here for evaluation. Pt's daughter states that he is a little more confused than his baseline.    Duration:  One day  Timing: constant  Location: n/a  Radiation: n/a  Quality: n/a  Intensity/Severity: moderate  Progression: worsening  Associated Symptoms: cough, sore throat, tachycardia, cough, weakness, headache  Aggravating Factors: none  Alleviating Factors: none  Previous Episodes: none  Treatment before arrival: none    MEDICAL RECORD REVIEW  Pt lives in assisted living. He is not a DNR. He has a h/x of HTN, parkinson's and GERD.     PAST MEDICAL HISTORY  Active Ambulatory Problems     Diagnosis Date Noted   • No Active Ambulatory Problems     Resolved Ambulatory Problems     Diagnosis Date Noted   • No Resolved Ambulatory Problems     Past Medical History:   Diagnosis Date   • Chorioretinitis    • GERD (gastroesophageal reflux disease)    • Hyperlipidemia    • Hypertension    • Kidney stone    • Parkinson's disease        PAST SURGICAL HISTORY  Past Surgical History:   Procedure Laterality Date   • CATARACT EXTRACTION, BILATERAL     • COLONOSCOPY     • KIDNEY STONE SURGERY     • TONSILLECTOMY         FAMILY HISTORY  History reviewed. No pertinent family history.    SOCIAL HISTORY  Social History     Social History   • Marital status:      Spouse name: N/A   • Number of children: N/A   • Years of education: N/A     Occupational History    • Not on file.     Social History Main Topics   • Smoking status: Never Smoker   • Smokeless tobacco: Never Used   • Alcohol use Yes      Comment: 3 drinks weekly   • Drug use: No   • Sexual activity: Not on file     Other Topics Concern   • Not on file     Social History Narrative   • No narrative on file       ALLERGIES  Review of patient's allergies indicates no known allergies.    REVIEW OF SYSTEMS  Review of Systems   Constitutional: Positive for fever. Negative for activity change and appetite change.   HENT: Positive for sore throat. Negative for congestion.    Eyes: Negative.    Respiratory: Positive for cough. Negative for shortness of breath.    Cardiovascular: Negative for chest pain and leg swelling.        Tachycardia   Gastrointestinal: Negative for abdominal pain, diarrhea and vomiting.   Endocrine: Negative.    Genitourinary: Negative for decreased urine volume and dysuria.   Musculoskeletal: Negative for neck pain.   Skin: Negative for rash and wound.   Allergic/Immunologic: Negative.    Neurological: Positive for weakness and headaches. Negative for numbness.   Hematological: Negative.    Psychiatric/Behavioral: Positive for confusion (mildly increased from baseline).   All other systems reviewed and are negative.      PHYSICAL EXAM  ED Triage Vitals   Temp Heart Rate Resp BP SpO2   02/01/18 1855 02/01/18 1855 02/01/18 1855 02/01/18 1855 02/01/18 1855   100.1 °F (37.8 °C) 116 18 154/99 96 %      Temp src Heart Rate Source Patient Position BP Location FiO2 (%)   02/01/18 1855 02/01/18 1855 -- -- --   Oral Monitor          Physical Exam   Constitutional: He is oriented to person, place, and time and well-developed, well-nourished, and in no distress. No distress.   Pt has fine tremor at baseline. He answers questions with slight confusion and needs to be re-directed.    HENT:   Head: Normocephalic and atraumatic.   Mouth/Throat: Oropharynx is clear and moist.   Eyes: EOM are normal. Pupils are  equal, round, and reactive to light.   Neck: Normal range of motion. Neck supple.   Cardiovascular: Regular rhythm and normal heart sounds.  Tachycardia present.    Pulmonary/Chest: Effort normal. No respiratory distress. He has decreased breath sounds (coarse) in the right upper field, the right middle field and the right lower field. He has no wheezes. He exhibits no tenderness.   Abdominal: Soft. He exhibits no distension. There is no tenderness. There is no rebound and no guarding.   Musculoskeletal: Normal range of motion. He exhibits no edema.   Lymphadenopathy:     He has no cervical adenopathy.   Neurological: He is alert and oriented to person, place, and time.   Skin: Skin is warm and dry. No rash noted. No pallor.   Psychiatric: Mood, memory, affect and judgment normal.   Nursing note and vitals reviewed.      LAB RESULTS  Recent Results (from the past 24 hour(s))   Influenza Antigen, Rapid - Swab, Nasopharynx    Collection Time: 02/01/18  8:16 PM   Result Value Ref Range    Influenza A Ag, EIA Negative Negative    Influenza B Ag, EIA Negative Negative   Comprehensive Metabolic Panel    Collection Time: 02/01/18  8:16 PM   Result Value Ref Range    Glucose 99 65 - 99 mg/dL    BUN 15 8 - 23 mg/dL    Creatinine 1.25 0.76 - 1.27 mg/dL    Sodium 140 136 - 145 mmol/L    Potassium 3.3 (L) 3.5 - 5.2 mmol/L    Chloride 99 98 - 107 mmol/L    CO2 32.1 (H) 22.0 - 29.0 mmol/L    Calcium 9.3 8.6 - 10.5 mg/dL    Total Protein 7.0 6.0 - 8.5 g/dL    Albumin 4.50 3.50 - 5.20 g/dL    ALT (SGPT) 14 1 - 41 U/L    AST (SGOT) 28 1 - 40 U/L    Alkaline Phosphatase 85 39 - 117 U/L    Total Bilirubin 0.9 0.1 - 1.2 mg/dL    eGFR Non African Amer 57 (L) >60 mL/min/1.73    Globulin 2.5 gm/dL    A/G Ratio 1.8 g/dL    BUN/Creatinine Ratio 12.0 7.0 - 25.0    Anion Gap 8.9 mmol/L   CBC Auto Differential    Collection Time: 02/01/18  8:16 PM   Result Value Ref Range    WBC 9.29 4.50 - 10.70 10*3/mm3    RBC 4.19 (L) 4.60 - 6.00 10*6/mm3     Hemoglobin 13.8 13.7 - 17.6 g/dL    Hematocrit 40.0 (L) 40.4 - 52.2 %    MCV 95.5 79.8 - 96.2 fL    MCH 32.9 (H) 27.0 - 32.7 pg    MCHC 34.5 32.6 - 36.4 g/dL    RDW 13.0 11.5 - 14.5 %    RDW-SD 44.9 37.0 - 54.0 fl    MPV 9.5 6.0 - 12.0 fL    Platelets 136 (L) 140 - 500 10*3/mm3    Neutrophil % 86.0 (H) 42.7 - 76.0 %    Lymphocyte % 5.1 (L) 19.6 - 45.3 %    Monocyte % 7.8 5.0 - 12.0 %    Eosinophil % 0.8 0.3 - 6.2 %    Basophil % 0.1 0.0 - 1.5 %    Immature Grans % 0.2 0.0 - 0.5 %    Neutrophils, Absolute 8.00 1.90 - 8.10 10*3/mm3    Lymphocytes, Absolute 0.47 (L) 0.90 - 4.80 10*3/mm3    Monocytes, Absolute 0.72 0.20 - 1.20 10*3/mm3    Eosinophils, Absolute 0.07 0.00 - 0.70 10*3/mm3    Basophils, Absolute 0.01 0.00 - 0.20 10*3/mm3    Immature Grans, Absolute 0.02 0.00 - 0.03 10*3/mm3   Respiratory Panel, PCR - Swab, Nasopharynx    Collection Time: 02/01/18  8:16 PM   Result Value Ref Range    ADENOVIRUS, PCR Not Detected Not Detected    Coronavirus 229E Not Detected Not Detected    Coronavirus HKU1 Not Detected Not Detected    Coronavirus NL63 Not Detected Not Detected    Coronavirus OC43 Not Detected Not Detected    Human Metapneumovirus Not Detected Not Detected    Human Rhinovirus/Enterovirus Not Detected Not Detected    Influenza B PCR Not Detected Not Detected    Parainfluenza Virus 1 Not Detected Not Detected    Parainfluenza Virus 2 Not Detected Not Detected    Parainfluenza Virus 3 Not Detected Not Detected    Parainfluenza Virus 4 Not Detected Not Detected    Bordetella pertussis pcr Not Detected Not Detected    Influenza A H1N1 2009 PCR Not Detected Not Detected    Chlamydophila pneumoniae PCR Not Detected Not Detected    Mycoplasma pneumo by PCR Not Detected Not Detected    Influenza A PCR Not Detected Not Detected    Influenza A H3 Detected (A) Not Detected    Influenza A H1 Not Detected Not Detected    RSV, PCR Not Detected Not Detected   Rapid Strep A Screen - Swab, Throat    Collection Time:  02/01/18  8:17 PM   Result Value Ref Range    Strep A Ag Negative Negative   Urinalysis With / Culture If Indicated - Urine, Clean Catch    Collection Time: 02/01/18  9:22 PM   Result Value Ref Range    Color, UA Yellow Yellow, Straw    Appearance, UA Clear Clear    pH, UA 5.5 5.0 - 8.0    Specific Gravity, UA 1.022 1.005 - 1.030    Glucose, UA Negative Negative    Ketones, UA Negative Negative    Bilirubin, UA Negative Negative    Blood, UA Negative Negative    Protein, UA Negative Negative    Leuk Esterase, UA Negative Negative    Nitrite, UA Negative Negative    Urobilinogen, UA 1.0 E.U./dL 0.2 - 1.0 E.U./dL       I ordered the above labs and reviewed the results    RADIOLOGY  XR Chest 2 View   Final Result      Reviewed CXR which shows nothing acute. Independently viewed by me. Interpreted by radiologist.       I ordered the above noted radiological studies and reviewed the images on the PACS system.        COURSE & MEDICAL DECISION MAKING  Pertinent Labs and Imaging studies that were ordered and reviewed are noted above.  Results were reviewed/discussed with the patient and they were also made aware of online assess.  Pt also made aware that some labs, such as cultures, will not be resulted during ER visit and follow up with PMD is necessary.       PROGRESS AND CONSULTS    Progress Notes:    ED Course     2115   Reviewed Pt's history and workup with Dr. Nichols.  After a bedside evaluation, Dr. Nichols agrees with the plan of care.    2237  Placed call to [facility] for admission.    2247  Dr. Nichols spoke to  ([facility]) who agrees to admit Pt to telemetry for further evaluation and treatment. Based on the patient's lab findings and presenting symptoms, the doctor and I feel it is appropriate to admit the patient for further management, evaluation, and treatment.  I have discussed this with the admitting team.  I have also discussed this with the patient/family.  They are in agreement with admission.   "      MEDICATIONS GIVEN IN ER  Medications   sodium chloride 0.9 % bolus 1,000 mL (0 mL Intravenous Stopped 2/1/18 2105)   acetaminophen (TYLENOL) tablet 650 mg (650 mg Oral Given 2/1/18 2042)   albuterol (PROVENTIL) nebulizer solution 0.083% 2.5 mg/3mL (2.5 mg Nebulization Given 2/1/18 2214)   pramipexole (MIRAPEX) tablet 1 mg (1 mg Oral Given 2/1/18 2219)   rivastigmine (EXELON) capsule 6 mg (6 mg Oral Given 2/1/18 2220)       /71 (BP Location: Left arm, Patient Position: Lying)  Pulse 104  Temp 97.7 °F (36.5 °C) (Oral)   Resp 24  Ht 172.7 cm (68\")  Wt 99.7 kg (219 lb 12.8 oz)  SpO2 91%  BMI 33.42 kg/m2      DIAGNOSIS  Final diagnoses:   Generalized weakness   Cough       Documentation assistance provided by whit Jean for Rose Mary Foley PA-C.  Information recorded by the scribe was done at my direction and has been verified and validated by me.  Electronically signed by Rose Mary Foley on 2/1/2018 at time 10:45 PM       Radha Jean  02/01/18 3596       Rose Mary Foley PA-C  02/02/18 0019    "

## 2018-02-02 NOTE — PLAN OF CARE
Problem: Patient Care Overview (Adult)  Goal: Plan of Care Review   02/02/18 1339   Coping/Psychosocial Response Interventions   Plan Of Care Reviewed With patient   Outcome Evaluation   Outcome Summary/Follow up Plan Pt. will benefit from skilled inpt. P.T. to address his functional deficits and to assist pt. in regaining his independence with functional mobility.       Problem: Inpatient Physical Therapy  Goal: Bed Mobility Goal LTG- PT   02/02/18 1339   Bed Mobility PT LTG   Bed Mobility PT LTG, Date Established 02/02/18   Bed Mobility PT LTG, Time to Achieve 5 - 7 days   Bed Mobility PT LTG, Activity Type all bed mobility   Bed Mobility PT LTG, [place] Level independent     Goal: Transfer Training Goal 1 LTG- PT   02/02/18 1339   Transfer Training PT LTG   Transfer Training PT LTG, Date Established 02/02/18   Transfer Training PT LTG, Time to Achieve 5 - 7 days   Transfer Training PT LTG, Activity Type all transfers   Transfer Training PT LTG, [place] Level independent     Goal: Gait Training Goal LTG- PT   02/02/18 1339   Gait Training PT LTG   Gait Training Goal PT LTG, Date Established 02/02/18   Gait Training Goal PT LTG, Time to Achieve 5 - 7 days   Gait Training Goal PT LTG, [place] Level independent   Gait Training Goal PT LTG, Distance to Achieve 300 feet

## 2018-02-02 NOTE — PLAN OF CARE
Problem: Patient Care Overview (Adult)  Goal: Plan of Care Review  Outcome: Ongoing (interventions implemented as appropriate)   02/02/18 0207   Coping/Psychosocial Response Interventions   Plan Of Care Reviewed With patient   Patient Care Overview   Progress no change   Outcome Evaluation   Outcome Summary/Follow up Plan Pt admitted to unit with positive respiratory panel for influenza A. VSS, WNL. No fever at this time. Pt oriented to self. Provided orientation to unit, discussed the need for a bed alarm and provided patient with call light. All questions and concerns answered and addressed. Will continue to monitor. SHANE ROSE       Problem: Infection, Risk/Actual (Adult)  Goal: Identify Related Risk Factors and Signs and Symptoms  Outcome: Ongoing (interventions implemented as appropriate)   02/02/18 0207   Infection, Risk/Actual   Infection, Risk/Actual: Related Risk Factors age extremes;chronic illness/condition;sleep disturbance;exposure to microbes   Signs and Symptoms (Infection, Risk/Actual) cultures positive;body temperature changes       Problem: Fall Risk (Adult)  Goal: Identify Related Risk Factors and Signs and Symptoms  Outcome: Ongoing (interventions implemented as appropriate)   02/02/18 0207   Fall Risk   Fall Risk: Related Risk Factors age-related changes;confusion/agitation;gait/mobility problems;inadequate lighting;sensory deficits;sleep pattern alteration;slipper/uneven surfaces;environment unfamiliar   Fall Risk: Signs and Symptoms presence of risk factors

## 2018-02-02 NOTE — ED PROVIDER NOTES
Pt presents accompanied by his daughter (MD) to the ED due to flu symptoms that began earlier today.       On exam Pt is oriented to person only.  Lungs, there are mild decreased breath sounds bilaterally.  Abdomen is soft and non-tender.     I reviewed Pt workup.  Discussed pertinent results with Pt and daughter.  Discussed plan to admit.  Pt understands and agrees with the plan, all questions answered.    2246  Spoke with Dr. Santiago who will admit.      I supervised care provided by the midlevel provider.    We have discussed this patient's history, physical exam, and treatment plan.   I have reviewed the note and personally saw and examined the patient and agree with the plan of care.    Documentation assistance provided by whit Nunez for Dr. Nichols.  Information recorded by the scribe was done at my direction and has been verified and validated by me.          Trinity Nunez  02/01/18 9354       Claudio Nichols MD  02/01/18 8532

## 2018-02-02 NOTE — PROGRESS NOTES
"  Name: Rogelio Sher  ADMIT: 2018   Age/Sex: 72 y.o.male LOS:  LOS: 0 days    :    1945     ROOM: [facility]   MRN:    [id number]    PCP:    Naeem Garcia MD     Subjective   Doing ok. Still confused. No complaints of sob or pain.     Objective   Vital Signs  Temp:  [97.7 °F (36.5 °C)-100.3 °F (37.9 °C)] 97.8 °F (36.6 °C)  Heart Rate:  [] 92  Resp:  [18-24] 18  BP: (131-170)/(71-99) 158/88  Body mass index is 33.42 kg/(m^2).    Objective:  General Appearance:  Comfortable and well-appearing.    Vital signs: (most recent): Blood pressure 158/88, pulse 92, temperature 97.8 °F (36.6 °C), temperature source Oral, resp. rate 18, height 172.7 cm (68\"), weight 99.7 kg (219 lb 12.8 oz), SpO2 95 %.  Vital signs are normal.    HEENT: Normal HEENT exam.    Lungs:  Normal effort.  Breath sounds clear to auscultation.    Heart: Normal rate.  Regular rhythm.    Abdomen: Abdomen is soft and non-distended.  Bowel sounds are normal.   There is no abdominal tenderness.     Extremities: There is no deformity or dependent edema.    Neurological: Patient is alert.  (Oriented x1).    Skin:  Warm and dry.  No rash.             Results Review:       I reviewed the patient's new clinical results.    Results from last 7 days  Lab Units 18  0443 18   WBC 10*3/mm3 6.01 9.29   HEMOGLOBIN g/dL 12.7* 13.8   PLATELETS 10*3/mm3 102* 136*     Results from last 7 days  Lab Units 18  0443 18   SODIUM mmol/L 140 140   POTASSIUM mmol/L 3.0* 3.3*   CHLORIDE mmol/L 101 99   CO2 mmol/L 25.5 32.1*   BUN mg/dL 13 15   CREATININE mg/dL 1.08 1.25   GLUCOSE mg/dL 100* 99   Estimated Creatinine Clearance: 69.7 mL/min (by C-G formula based on Cr of 1.08).  Results from last 7 days  Lab Units 18  0443 18   CALCIUM mg/dL 8.4* 9.3   ALBUMIN g/dL  --  4.50   MAGNESIUM mg/dL 1.8  --        RADIOLOGY  2018  Pending      aspirin 81 mg Oral Daily   atorvastatin 20 mg Oral Daily   enoxaparin 40 " mg Subcutaneous Nightly   valsartan 160 mg Oral Q24H   And      hydrochlorothiazide 12.5 mg Oral Daily   memantine 10 mg Oral Q12H   multivitamin with minerals 1 tablet Oral Daily   oseltamivir 30 mg Oral Q12H   pantoprazole 40 mg Oral QAM   potassium chloride 40 mEq Oral BID With Meals   pramipexole 1 mg Oral TID   rivastigmine 6 mg Oral BID With Meals   vitamin C 500 mg Oral Daily       sodium chloride 0.9 % with KCl 20 mEq 75 mL/hr Last Rate: 75 mL/hr (02/02/18 0773)   Diet Regular      Assessment/Plan   Assessment:   Principal Problem:    Influenza A  Active Problems:    Generalized weakness    Parkinson's disease    Essential hypertension        Plan:   1. Influenza A with dehydration  - cont IVF for another 24 hours  - cont tamiflu, renally dosed  - prn albuterol nebs ordered    2. Encephalopathy with underlying Parkinson's dementia  - cont Parkinson's meds  - encephalopathy improving. Unclear what his baseline is    3. HTN  - home meds restarted   - BP ok    4. Hypokalemia  - replacing orally and in IVF  - recheck in am  - check mag as well      Disposition  1-2 days depending on mental status      Flako Martinez MD  [facility]  02/02/18  10:44 AM

## 2018-02-03 LAB
BACTERIA SPEC AEROBE CULT: NORMAL
MAGNESIUM SERPL-MCNC: 1.9 MG/DL (ref 1.6–2.4)
POTASSIUM BLD-SCNC: 3.5 MMOL/L (ref 3.5–5.2)

## 2018-02-03 PROCEDURE — 25810000003 SODIUM CHLORIDE 0.9 % WITH KCL 20 MEQ 20-0.9 MEQ/L-% SOLUTION: Performed by: INTERNAL MEDICINE

## 2018-02-03 PROCEDURE — 25010000002 ENOXAPARIN PER 10 MG: Performed by: INTERNAL MEDICINE

## 2018-02-03 PROCEDURE — 97110 THERAPEUTIC EXERCISES: CPT

## 2018-02-03 PROCEDURE — 83735 ASSAY OF MAGNESIUM: CPT | Performed by: INTERNAL MEDICINE

## 2018-02-03 PROCEDURE — 84132 ASSAY OF SERUM POTASSIUM: CPT | Performed by: INTERNAL MEDICINE

## 2018-02-03 RX ORDER — POTASSIUM CHLORIDE 750 MG/1
40 CAPSULE, EXTENDED RELEASE ORAL ONCE
Status: DISCONTINUED | OUTPATIENT
Start: 2018-02-03 | End: 2018-02-05 | Stop reason: HOSPADM

## 2018-02-03 RX ORDER — OSELTAMIVIR PHOSPHATE 75 MG/1
75 CAPSULE ORAL EVERY 12 HOURS SCHEDULED
Status: DISCONTINUED | OUTPATIENT
Start: 2018-02-03 | End: 2018-02-05 | Stop reason: HOSPADM

## 2018-02-03 RX ADMIN — HYDROCHLOROTHIAZIDE 12.5 MG: 25 TABLET ORAL at 10:29

## 2018-02-03 RX ADMIN — MULTIPLE VITAMINS W/ MINERALS TAB 1 TABLET: TAB at 10:28

## 2018-02-03 RX ADMIN — PRAMIPEXOLE DIHYDROCHLORIDE 1 MG: 1 TABLET ORAL at 10:28

## 2018-02-03 RX ADMIN — OSELTAMIVIR PHOSPHATE 75 MG: 75 CAPSULE ORAL at 21:59

## 2018-02-03 RX ADMIN — RIVASTIGMINE TARTRATE 6 MG: 3 CAPSULE ORAL at 10:26

## 2018-02-03 RX ADMIN — ENOXAPARIN SODIUM 40 MG: 40 INJECTION SUBCUTANEOUS at 21:59

## 2018-02-03 RX ADMIN — RIVASTIGMINE TARTRATE 6 MG: 3 CAPSULE ORAL at 17:30

## 2018-02-03 RX ADMIN — PRAMIPEXOLE DIHYDROCHLORIDE 1 MG: 1 TABLET ORAL at 21:59

## 2018-02-03 RX ADMIN — PANTOPRAZOLE SODIUM 40 MG: 40 TABLET, DELAYED RELEASE ORAL at 06:52

## 2018-02-03 RX ADMIN — OSELTAMIVIR PHOSPHATE 30 MG: 30 CAPSULE ORAL at 10:28

## 2018-02-03 RX ADMIN — PRAMIPEXOLE DIHYDROCHLORIDE 1 MG: 1 TABLET ORAL at 16:55

## 2018-02-03 RX ADMIN — OXYCODONE HYDROCHLORIDE AND ACETAMINOPHEN 500 MG: 500 TABLET ORAL at 10:26

## 2018-02-03 RX ADMIN — MEMANTINE HYDROCHLORIDE 10 MG: 10 TABLET, FILM COATED ORAL at 10:28

## 2018-02-03 RX ADMIN — POTASSIUM CHLORIDE 40 MEQ: 750 CAPSULE, EXTENDED RELEASE ORAL at 10:31

## 2018-02-03 RX ADMIN — MEMANTINE HYDROCHLORIDE 10 MG: 10 TABLET, FILM COATED ORAL at 21:59

## 2018-02-03 RX ADMIN — ASPIRIN 81 MG: 81 TABLET ORAL at 10:29

## 2018-02-03 RX ADMIN — ATORVASTATIN CALCIUM 20 MG: 20 TABLET, FILM COATED ORAL at 10:29

## 2018-02-03 RX ADMIN — POTASSIUM CHLORIDE AND SODIUM CHLORIDE 75 ML/HR: 900; 150 INJECTION, SOLUTION INTRAVENOUS at 05:11

## 2018-02-03 RX ADMIN — POTASSIUM CHLORIDE 40 MEQ: 750 CAPSULE, EXTENDED RELEASE ORAL at 17:30

## 2018-02-03 RX ADMIN — VALSARTAN 160 MG: 160 TABLET ORAL at 10:29

## 2018-02-03 NOTE — PLAN OF CARE
Problem: Patient Care Overview (Adult)  Goal: Plan of Care Review   02/03/18 2446   Coping/Psychosocial Response Interventions   Plan Of Care Reviewed With patient   Patient Care Overview   Progress improving   AMBULATING FURTHER TODAY.  APPEARS TO BE GETTING STRONGER.

## 2018-02-03 NOTE — PLAN OF CARE
Problem: Patient Care Overview (Adult)  Goal: Plan of Care Review  Outcome: Ongoing (interventions implemented as appropriate)   02/02/18 2004 02/03/18 0343   Coping/Psychosocial Response Interventions   Plan Of Care Reviewed With --  patient   Patient Care Overview   Progress progress toward functional goals as expected --    Outcome Evaluation   Outcome Summary/Follow up Plan --  Patient doing well. Up multiple times for urination. Safety maintained, will continue to monitor.       Problem: Infection, Risk/Actual (Adult)  Goal: Identify Related Risk Factors and Signs and Symptoms  Outcome: Outcome(s) achieved Date Met: 02/03/18    Goal: Infection Prevention/Resolution  Outcome: Ongoing (interventions implemented as appropriate)      Problem: Fall Risk (Adult)  Goal: Identify Related Risk Factors and Signs and Symptoms  Outcome: Ongoing (interventions implemented as appropriate)    Goal: Absence of Falls  Outcome: Ongoing (interventions implemented as appropriate)      Problem: Skin Integrity Impairment, Risk/Actual (Adult)  Goal: Identify Related Risk Factors and Signs and Symptoms  Outcome: Ongoing (interventions implemented as appropriate)

## 2018-02-03 NOTE — PLAN OF CARE
Problem: Patient Care Overview (Adult)  Goal: Plan of Care Review  Outcome: Ongoing (interventions implemented as appropriate)      Problem: Infection, Risk/Actual (Adult)  Goal: Infection Prevention/Resolution  Outcome: Ongoing (interventions implemented as appropriate)      Problem: Fall Risk (Adult)  Goal: Absence of Falls  Outcome: Ongoing (interventions implemented as appropriate)

## 2018-02-03 NOTE — THERAPY TREATMENT NOTE
Acute Care - Physical Therapy Treatment Note  [facility]     Patient Name: Rogelio Sher  : 1945  MRN: [id number]  Today's Date: 2/3/2018  Onset of Illness/Injury or Date of Surgery Date: 18  Date of Referral to PT: 18  Referring Physician: Nehemias Bowen    Admit Date: 2018    Visit Dx:    ICD-10-CM ICD-9-CM   1. Generalized weakness R53.1 780.79   2. Cough R05 786.2   3. Muscle weakness (generalized) M62.81 728.87     Patient Active Problem List   Diagnosis   • Generalized weakness   • Parkinson's disease   • Essential hypertension   • Influenza A               Adult Rehabilitation Note       18 1331          Rehab Assessment/Intervention    Discipline physical therapist  -JR      Document Type therapy note (daily note)  -JR      Subjective Information agree to therapy;complains of;weakness;fatigue  -JR      Patient Effort, Rehab Treatment good  -JR      Precautions/Limitations fall precautions;other (see comments)   SHUFFLES FEET AS HE AMBULATES.   -JR      Recorded by [JR] Rasheed White, PT      Vital Signs    O2 Delivery Pre Treatment room air  -JR      Recorded by [JR] Rasheed White, PT      Pain Assessment    Pain Assessment No/denies pain  -JR      Recorded by [JR] Rasheed White PT      Cognitive Assessment/Intervention    Current Cognitive/Communication Assessment functional  -JR      Orientation Status oriented x 4;other (see comments)   DOES NOT VERBALIZE A LOT.   -JR      Follows Commands/Answers Questions 100% of the time;able to follow multi-step instructions;needs increased time;needs cueing  -JR      Personal Safety mild impairment  -JR      Personal Safety Interventions gait belt;muscle strengthening facilitated;nonskid shoes/slippers when out of bed  -JR      Recorded by [JR] Rasheed White, PT      Bed Mobility, Assessment/Treatment    Bed Mobility, Scoot/Bridge, [place] contact guard assist  -JR      Bed Mob, Supine to Sit, [place]  minimum assist (75% patient effort);verbal cues required;nonverbal cues required (demo/gesture)  -JR      Bed Mob, Sit to Supine, [place] minimum assist (75% patient effort);nonverbal cues required (demo/gesture);verbal cues required  -JR      Recorded by [JR] Rasheed White PT      Transfer Assessment/Treatment    Transfers, Sit-Stand [place] minimum assist (75% patient effort);nonverbal cues required (demo/gesture);verbal cues required  -JR      Transfers, Stand-Sit [place] nonverbal cues required (demo/gesture);hand held assist;minimum assist (75% patient effort)  -JR      Transfer, Impairments impaired balance  -JR      Recorded by [JR] Rasheed White PT      Gait Assessment/Treatment    Gait, [place] Level nonverbal cues required (demo/gesture);contact guard assist  -JR      Gait, Assistive Device other (see comments)   HANDHELD ASSISTANCE  -JR      Gait, Distance (Feet) 230  -JR      Gait, Gait Deviations odalys decreased;forward flexed posture;step length decreased;stride length decreased  -JR      Gait, Safety Issues step length decreased;sequencing ability decreased  -JR      Gait, Impairments impaired balance;strength decreased  -JR      Recorded by [JR] Rashede White PT      Balance Skills Training    Standing-Level of Assistance Minimum assistance  -JR      Static Standing Balance Support No upper extremity supported  -JR      Standing-Balance Activities Weight Shift A-P  -JR      Standing Balance # of Minutes 2   STOOD IN ORDER TO HAVE NEW BRIEF DONNED.    -JR      Recorded by [JR] Rasheed White PT      Positioning and Restraints    Pre-Treatment Position in bed  -JR      Post Treatment Position bed  -JR      In Bed notified nsg;supine;call light within reach;encouraged to call for assist;legs elevated  -JR      Recorded by [JR] Rasheed White PT        User Key  (r) = Recorded By, (t) = Taken By, (c) = Cosigned By    Initials Name Effective Dates     Rasheed White  PT 01/07/16 -                 IP PT Goals       02/02/18 1339          Bed Mobility PT LTG    Bed Mobility PT LTG, Date Established 02/02/18  -MS      Bed Mobility PT LTG, Time to Achieve 5 - 7 days  -MS      Bed Mobility PT LTG, Activity Type all bed mobility  -MS      Bed Mobility PT LTG, [place] Level independent  -MS      Transfer Training PT LTG    Transfer Training PT LTG, Date Established 02/02/18  -MS      Transfer Training PT LTG, Time to Achieve 5 - 7 days  -MS      Transfer Training PT LTG, Activity Type all transfers  -MS      Transfer Training PT LTG, [place] Level independent  -MS      Gait Training PT LTG    Gait Training Goal PT LTG, Date Established 02/02/18  -MS      Gait Training Goal PT LTG, Time to Achieve 5 - 7 days  -MS      Gait Training Goal PT LTG, [place] Level independent  -MS      Gait Training Goal PT LTG, Distance to Achieve 300 feet  -MS        User Key  (r) = Recorded By, (t) = Taken By, (c) = Cosigned By    Initials Name Provider Type    MS Isaac RUVALCABA Demetrio, PT Physical Therapist          Physical Therapy Education     Title: PT OT SLP Therapies (Done)     Topic: Physical Therapy (Done)     Point: Mobility training (Done)    Learning Progress Summary    Learner Readiness Method Response Comment Documented by Status   Patient PEE Ramos   02/03/18 1335 Done    Acceptance ECELINE NR  MS 02/02/18 1339 Done               Point: Home exercise program (Done)    Learning Progress Summary    Learner Readiness Method Response Comment Documented by Status   Patient PEE Ramos   02/03/18 1335 Done    Acceptance ECELINE NR  MS 02/02/18 1339 Done               Point: Body mechanics (Done)    Learning Progress Summary    Learner Readiness Method Response Comment Documented by Status   Patient Eager PEE HARRISON   02/03/18 1335 Done    Acceptance ED JENNIFER GLASGOW  MS 02/02/18 1339 Done               Point: Precautions (Done)    Learning Progress Summary    Learner Readiness Method  Response Comment Documented by Status   Patient Deonte KEN PEE GARCIA   02/03/18 1335 Done    Acceptance CELINE ROGERS NR  MS 02/02/18 1339 Done                      User Key     Initials Effective Dates Name Provider Type Discipline    MS 12/01/15 -  Isaac Atkinson, PT Physical Therapist PT     01/07/16 -  Rasheed White, PT Physical Therapist PT                    PT Recommendation and Plan  Anticipated Discharge Disposition: home with assist, home with home health  Planned Therapy Interventions: balance training, bed mobility training, gait training, home exercise program, patient/family education, postural re-education, strengthening, transfer training  PT Frequency: daily  Plan of Care Review  Plan Of Care Reviewed With: patient  Progress: improving          Outcome Measures       02/03/18 1336 02/02/18 1300       How much help from another person do you currently need...    Turning from your back to your side while in flat bed without using bedrails? 4  -JR 3  -MS     Moving from lying on back to sitting on the side of a flat bed without bedrails? 3  -JR 3  -MS     Moving to and from a bed to a chair (including a wheelchair)? 3  -JR 3  -MS     Standing up from a chair using your arms (e.g., wheelchair, bedside chair)? 3  -JR 3  -MS     Climbing 3-5 steps with a railing? 3  -JR 3  -MS     To walk in hospital room? 3  -JR 3  -MS     AM-PAC 6 Clicks Score 19  -JR 18  -MS     Functional Assessment    Outcome Measure Options AM-PAC 6 Clicks Basic Mobility (PT)  -JR AM-PAC 6 Clicks Basic Mobility (PT)  -MS       User Key  (r) = Recorded By, (t) = Taken By, (c) = Cosigned By    Initials Name Provider Type    MS Isaac Atkinson, PT Physical Therapist    JR Rasheed White, PT Physical Therapist           Time Calculation:         PT Charges       02/03/18 1336          Time Calculation    Start Time 1305  -JR      Stop Time 1337  -JR      Time Calculation (min) 32 min  -JR      PT Received On 02/03/18  -JR      PT - Next  Appointment 02/04/18  -        User Key  (r) = Recorded By, (t) = Taken By, (c) = Cosigned By    Initials Name Provider Type    JR Rasheed White, PT Physical Therapist          Therapy Charges for Today     Code Description Service Date Service Provider Modifiers Qty    40797472412 HC PT THER PROC EA 15 MIN 2/3/2018 Rasheed White, PT GP 2          PT G-Codes  Outcome Measure Options: AM-PAC 6 Clicks Basic Mobility (PT)    Rasheed White, PT  2/3/2018

## 2018-02-03 NOTE — PROGRESS NOTES
"  Name: Rogelio Sher  ADMIT: 2018   Age/Sex: 72 y.o.male LOS:  LOS: 1 day    :    1945     ROOM: [facility]   MRN:    [id number]    PCP:    Naeem Garcia MD     Subjective   Doing ok. No sob. No light-headedness    Objective   Vital Signs  Temp:  [97.3 °F (36.3 °C)-98.6 °F (37 °C)] 97.7 °F (36.5 °C)  Heart Rate:  [66-88] 88  Resp:  [18] 18  BP: (124-171)/(84-99) 158/99  Body mass index is 33.42 kg/(m^2).    Objective:  General Appearance:  Comfortable and well-appearing.    Vital signs: (most recent): Blood pressure 158/99, pulse 88, temperature 97.7 °F (36.5 °C), temperature source Oral, resp. rate 18, height 172.7 cm (68\"), weight 99.7 kg (219 lb 12.8 oz), SpO2 94 %.  Vital signs are normal.    HEENT: Normal HEENT exam.    Lungs:  Normal effort.  Breath sounds clear to auscultation.    Heart: Normal rate.  Regular rhythm.    Abdomen: Abdomen is soft and non-distended.  Bowel sounds are normal.   There is no abdominal tenderness.     Extremities: Normal range of motion.  There is no dependent edema.    Neurological: Patient is alert.  (Oriented x1).    Skin:  Warm and dry.  No rash.             Results Review:       I reviewed the patient's new clinical results.    Results from last 7 days  Lab Units 18  0443 18   WBC 10*3/mm3 6.01 9.29   HEMOGLOBIN g/dL 12.7* 13.8   PLATELETS 10*3/mm3 102* 136*       Results from last 7 days  Lab Units 18  0455 18   SODIUM mmol/L  --  140 140   POTASSIUM mmol/L 3.5 3.0* 3.3*   CHLORIDE mmol/L  --  101 99   CO2 mmol/L  --  25.5 32.1*   BUN mg/dL  --  13 15   CREATININE mg/dL  --  1.08 1.25   GLUCOSE mg/dL  --  100* 99   Estimated Creatinine Clearance: 69.7 mL/min (by C-G formula based on Cr of 1.08).    Results from last 7 days  Lab Units 18  0455 18  0443 18   CALCIUM mg/dL  --  8.4* 9.3   ALBUMIN g/dL  --   --  4.50   MAGNESIUM mg/dL 1.9 1.8  --        RADIOLOGY  2/3/2018  Pending      aspirin " 81 mg Oral Daily   atorvastatin 20 mg Oral Daily   enoxaparin 40 mg Subcutaneous Nightly   valsartan 160 mg Oral Q24H   And      hydrochlorothiazide 12.5 mg Oral Daily   memantine 10 mg Oral Q12H   multivitamin with minerals 1 tablet Oral Daily   oseltamivir 30 mg Oral Q12H   pantoprazole 40 mg Oral QAM   potassium chloride 40 mEq Oral BID With Meals   potassium chloride 40 mEq Oral Once   pramipexole 1 mg Oral TID   rivastigmine 6 mg Oral BID With Meals   vitamin C 500 mg Oral Daily      Diet Regular      Assessment/Plan   Assessment:   Principal Problem:    Influenza A  Active Problems:    Generalized weakness    Parkinson's disease    Essential hypertension        Plan:   1. Influenza A with dehydration  - will d/c IVF  - cont tamiflu, renally dosed  - prn albuterol nebs ordered     2. Encephalopathy with underlying Parkinson's dementia  - cont Parkinson's meds  - encephalopathy improving, not quite back to baseline per daughter but improving     3. HTN  - home meds restarted   - BP ok     4. Hypokalemia  - still a little low. Will give an additional 40mEq today          Disposition  Depending on if he needs subacute rehab or not      Flako Martinez MD  [facility]  02/03/18  10:38 AM

## 2018-02-04 PROCEDURE — 97110 THERAPEUTIC EXERCISES: CPT | Performed by: PHYSICAL THERAPIST

## 2018-02-04 PROCEDURE — 25010000002 ENOXAPARIN PER 10 MG: Performed by: INTERNAL MEDICINE

## 2018-02-04 RX ADMIN — HYDROCHLOROTHIAZIDE 12.5 MG: 25 TABLET ORAL at 09:35

## 2018-02-04 RX ADMIN — OXYCODONE HYDROCHLORIDE AND ACETAMINOPHEN 500 MG: 500 TABLET ORAL at 09:34

## 2018-02-04 RX ADMIN — VALSARTAN 160 MG: 160 TABLET ORAL at 09:35

## 2018-02-04 RX ADMIN — ATORVASTATIN CALCIUM 20 MG: 20 TABLET, FILM COATED ORAL at 09:35

## 2018-02-04 RX ADMIN — MULTIPLE VITAMINS W/ MINERALS TAB 1 TABLET: TAB at 09:35

## 2018-02-04 RX ADMIN — ENOXAPARIN SODIUM 40 MG: 40 INJECTION SUBCUTANEOUS at 21:06

## 2018-02-04 RX ADMIN — ASPIRIN 81 MG: 81 TABLET ORAL at 09:35

## 2018-02-04 RX ADMIN — RIVASTIGMINE TARTRATE 6 MG: 3 CAPSULE ORAL at 09:34

## 2018-02-04 RX ADMIN — POTASSIUM CHLORIDE 40 MEQ: 750 CAPSULE, EXTENDED RELEASE ORAL at 18:08

## 2018-02-04 RX ADMIN — MEMANTINE HYDROCHLORIDE 10 MG: 10 TABLET, FILM COATED ORAL at 21:06

## 2018-02-04 RX ADMIN — OSELTAMIVIR PHOSPHATE 75 MG: 75 CAPSULE ORAL at 21:06

## 2018-02-04 RX ADMIN — PRAMIPEXOLE DIHYDROCHLORIDE 1 MG: 1 TABLET ORAL at 21:06

## 2018-02-04 RX ADMIN — PRAMIPEXOLE DIHYDROCHLORIDE 1 MG: 1 TABLET ORAL at 09:35

## 2018-02-04 RX ADMIN — PRAMIPEXOLE DIHYDROCHLORIDE 1 MG: 1 TABLET ORAL at 18:09

## 2018-02-04 RX ADMIN — POTASSIUM CHLORIDE 40 MEQ: 750 CAPSULE, EXTENDED RELEASE ORAL at 09:35

## 2018-02-04 RX ADMIN — PANTOPRAZOLE SODIUM 40 MG: 40 TABLET, DELAYED RELEASE ORAL at 06:51

## 2018-02-04 RX ADMIN — RIVASTIGMINE TARTRATE 6 MG: 3 CAPSULE ORAL at 18:08

## 2018-02-04 RX ADMIN — MEMANTINE HYDROCHLORIDE 10 MG: 10 TABLET, FILM COATED ORAL at 09:34

## 2018-02-04 RX ADMIN — OSELTAMIVIR PHOSPHATE 75 MG: 75 CAPSULE ORAL at 09:34

## 2018-02-04 NOTE — DISCHARGE PLACEMENT REQUEST
"Froylan Diggs (72 y.o. Male)     Date of Birth Social Security Number Address Home Phone MRN    1945  [address] [address] [phone] [id number]    [employer] Marital Status          [employer]        Admission Date Admission Type Admitting Provider Attending Provider Department, Room/Bed    2/1/18 Emergency Sharita, MD Michelle Hung Christopher E, MD [address], 439/1    Discharge Date Discharge Disposition Discharge Destination                      Attending Provider: Flako Martinez MD     Allergies:  No Known Allergies    Isolation:  Droplet   Infection:  Influenza (02/02/18)   Code Status:  FULL    Ht:  172.7 cm (68\")   Wt:  99.7 kg (219 lb 12.8 oz)    Admission Cmt:  None   Principal Problem:  Influenza A [J10.1]                 Active Insurance as of 2/1/2018     Primary Coverage     Payor Plan Insurance Group Employer/Plan Group    MEDICARE MEDICARE A & B      Payor Plan Address Payor Plan Phone Number Effective From Effective To    [address] [phone] 9/1/2009     [place] [address]       Subscriber Name Subscriber Birth Date Member ID       FROYLAN DIGGS 1945 [id number]           Secondary Coverage     Payor Plan Insurance Group Employer/Plan Group    AARP MED SUPP AARP HEALTH CARE OPTIONS      Payor Plan Address Payor Plan Phone Number Effective From Effective To    [facility] [phone] 1/1/2016     [address]       [place] [address]       Subscriber Name Subscriber Birth Date Member ID       FROYLAN DIGGS 1945 [id number]                 Emergency Contacts      (Rel.) Home Phone Work Phone Mobile Phone    Susan Ferreira (Daughter) [phone] -- [phone]    Shaheed Stuart (Son) [phone] -- [phone]              "

## 2018-02-04 NOTE — PLAN OF CARE
Problem: Patient Care Overview (Adult)  Goal: Plan of Care Review  Outcome: Ongoing (interventions implemented as appropriate)   02/04/18 1601   Coping/Psychosocial Response Interventions   Plan Of Care Reviewed With patient   Patient Care Overview   Progress improving   Outcome Evaluation   Outcome Summary/Follow up Plan Patient able to ambulate a good distance but demonstrates some confusion and bouts of instability during gait. Patient is not safe to return to independent living situation at this time

## 2018-02-04 NOTE — THERAPY TREATMENT NOTE
Acute Care - Physical Therapy Treatment Note  [facility]     Patient Name: Rogelio Shre  : 1945  MRN: [id number]  Today's Date: 2018  Onset of Illness/Injury or Date of Surgery Date: 18  Date of Referral to PT: 18  Referring Physician: Nehemias Bowen    Admit Date: 2018    Visit Dx:    ICD-10-CM ICD-9-CM   1. Generalized weakness R53.1 780.79   2. Cough R05 786.2   3. Muscle weakness (generalized) M62.81 728.87     Patient Active Problem List   Diagnosis   • Generalized weakness   • Parkinson's disease   • Essential hypertension   • Influenza A               Adult Rehabilitation Note       18 1556 18 1331       Rehab Assessment/Intervention    Discipline physical therapist  - physical therapist  -     Document Type therapy note (daily note)  - therapy note (daily note)  -JR     Subjective Information no complaints;agree to therapy  - agree to therapy;complains of;weakness;fatigue  -JR     Patient Effort, Rehab Treatment good  - good  -     Precautions/Limitations fall precautions   droplet (FLU), isolation, exit alarm  - fall precautions;other (see comments)   SHUFFLES FEET AS HE AMBULATES.   -JR     Recorded by [] Divya Flowers, PT [JR] Rasheed White, PT     Vital Signs    O2 Delivery Pre Treatment room air  - room air  -JR     Recorded by [] Divya Flowers, PT [JR] Rasheed White, PT     Pain Assessment    Pain Assessment No/denies pain  - No/denies pain  -JR     Recorded by [] Divya Flowers, PT [JR] Rasheed White PT     Cognitive Assessment/Intervention    Current Cognitive/Communication Assessment functional  - functional  -     Orientation Status oriented x 4;other (see comments)  - oriented x 4;other (see comments)   DOES NOT VERBALIZE A LOT.   -JR     Follows Commands/Answers Questions 100% of the time  - 100% of the time;able to follow multi-step instructions;needs increased time;needs cueing  -JR     Personal Safety mild  impairment  -KH mild impairment  -JR     Personal Safety Interventions fall prevention program maintained;gait belt;nonskid shoes/slippers when out of bed;supervised activity  - gait belt;muscle strengthening facilitated;nonskid shoes/slippers when out of bed  -JR     Recorded by [KH] Divya Flowers, PT [JR] Rasheed White, PT     Bed Mobility, Assessment/Treatment    Bed Mobility, Scoot/Bridge, [place] not tested  - contact guard assist  -JR     Bed Mob, Supine to Sit, [place] not tested  - minimum assist (75% patient effort);verbal cues required;nonverbal cues required (demo/gesture)  -JR     Bed Mob, Sit to Supine, [place]  minimum assist (75% patient effort);nonverbal cues required (demo/gesture);verbal cues required  -JR     Bed Mobility, Comment patient up in chair  -KH      Recorded by [KH] Divya Flowers, PT [JR] Rasheed White, PT     Transfer Assessment/Treatment    Transfers, Sit-Stand [place] supervision required  - minimum assist (75% patient effort);nonverbal cues required (demo/gesture);verbal cues required  -JR     Transfers, Stand-Sit [place] supervision required  - nonverbal cues required (demo/gesture);hand held assist;minimum assist (75% patient effort)  -JR     Transfer, Impairments impaired balance  - impaired balance  -JR     Transfer, Comment posterior trunk lean when first standing  -      Recorded by [KH] Divya Flowers, PT [JR] Rasheed White, PT     Gait Assessment/Treatment    Gait, [place] Level nonverbal cues required (demo/gesture);contact guard assist  - nonverbal cues required (demo/gesture);contact guard assist  -JR     Gait, Assistive Device --   HHA  -KH other (see comments)   HANDHELD ASSISTANCE  -JR     Gait, Distance (Feet) 400  - 230  -JR     Gait, Gait Deviations odalys decreased;forward flexed posture;step length decreased;stride length decreased;weight-shifting ability decreased  - odalys decreased;forward flexed posture;step  length decreased;stride length decreased  -JR     Gait, Safety Issues step length decreased;weight-shifting ability decreased;balance decreased during turns  -KH step length decreased;sequencing ability decreased  -JR     Gait, Impairments impaired balance;strength decreased  - impaired balance;strength decreased  -     Gait, Comment Patient able to ambulate a good distance but demonstrates multiple bouts of unsteadiness during gait that cause concern for patient to return to prior living situation  -KH      Recorded by [KH] Divya Flowers, PT [JR] Rasheed White, PT     Balance Skills Training    Standing-Level of Assistance  Minimum assistance  -JR     Static Standing Balance Support  No upper extremity supported  -JR     Standing-Balance Activities  Weight Shift A-P  -JR     Standing Balance # of Minutes  2   STOOD IN ORDER TO HAVE NEW BRIEF DONNED.    -JR     Recorded by  [JR] Rasheed White PT     Positioning and Restraints    Pre-Treatment Position sitting in chair/recliner  -KH in bed  -JR     Post Treatment Position chair  - bed  -JR     In Bed  notified nsg;supine;call light within reach;encouraged to call for assist;legs elevated  -JR     In Chair sitting;call light within reach;encouraged to call for assist;exit alarm on  -KH      Recorded by [KH] Divya Flowers, PT [JR] Rasheed White, PT       User Key  (r) = Recorded By, (t) = Taken By, (c) = Cosigned By    Initials Name Effective Dates    JR Rasheed White, PT 01/07/16 -     SUMMER Flowers, PT 06/22/16 -                 IP PT Goals       02/02/18 1339          Bed Mobility PT LTG    Bed Mobility PT LTG, Date Established 02/02/18  -MS      Bed Mobility PT LTG, Time to Achieve 5 - 7 days  -MS      Bed Mobility PT LTG, Activity Type all bed mobility  -MS      Bed Mobility PT LTG, [place] Level independent  -MS      Transfer Training PT LTG    Transfer Training PT LTG, Date Established 02/02/18  -MS      Transfer Training PT LTG, Time to Achieve  5 - 7 days  -MS      Transfer Training PT LTG, Activity Type all transfers  -MS      Transfer Training PT LTG, [place] Level independent  -MS      Gait Training PT LTG    Gait Training Goal PT LTG, Date Established 02/02/18  -MS      Gait Training Goal PT LTG, Time to Achieve 5 - 7 days  -MS      Gait Training Goal PT LTG, [place] Level independent  -MS      Gait Training Goal PT LTG, Distance to Achieve 300 feet  -MS        User Key  (r) = Recorded By, (t) = Taken By, (c) = Cosigned By    Initials Name Provider Type    MS Isaac Atkinson, PT Physical Therapist          Physical Therapy Education     Title: PT OT SLP Therapies (Done)     Topic: Physical Therapy (Done)     Point: Mobility training (Done)    Learning Progress Summary    Learner Readiness Method Response Comment Documented by Status   Patient Acceptance E PEE   02/04/18 1601 Done    PEE Ramos   02/03/18 1335 Done    Acceptance E,D VU,NR  MS 02/02/18 1339 Done               Point: Home exercise program (Done)    Learning Progress Summary    Learner Readiness Method Response Comment Documented by Status   Patient Eager PEE HARRISON   02/03/18 1335 Done    Acceptance E,D VU,NR  MS 02/02/18 1339 Done               Point: Body mechanics (Done)    Learning Progress Summary    Learner Readiness Method Response Comment Documented by Status   Patient Acceptance E PEE   02/04/18 1601 Done    PEE Ramos   02/03/18 1335 Done    Acceptance E,D VU,NR  MS 02/02/18 1339 Done               Point: Precautions (Done)    Learning Progress Summary    Learner Readiness Method Response Comment Documented by Status   Patient Acceptance E PEE   02/04/18 1601 Done    PEE Ramos   02/03/18 1335 Done    Acceptance E,D VU,NR  MS 02/02/18 1339 Done                      User Key     Initials Effective Dates Name Provider Type Discipline    MS 12/01/15 -  Isaac Atkinson, PT Physical Therapist PT     01/07/16 -  Rasheed White, PT Physical Therapist PT     SUMMER 06/22/16 -  Divya Flowers, PT Physical Therapist PT                    PT Recommendation and Plan  Anticipated Discharge Disposition: home with assist, home with home health  Planned Therapy Interventions: balance training, bed mobility training, gait training, home exercise program, patient/family education, postural re-education, strengthening, transfer training  PT Frequency: daily  Plan of Care Review  Plan Of Care Reviewed With: patient  Progress: improving  Outcome Summary/Follow up Plan: Patient able to ambulate a good distance but demonstrates some confusion and bouts of instability during gait. Patient is not safe to return to independent living situation at this time          Outcome Measures       02/04/18 1600 02/03/18 1336 02/02/18 1300    How much help from another person do you currently need...    Turning from your back to your side while in flat bed without using bedrails? 4  -KH 4  -JR 3  -MS    Moving from lying on back to sitting on the side of a flat bed without bedrails? 3  -KH 3  -JR 3  -MS    Moving to and from a bed to a chair (including a wheelchair)? 3  -KH 3  -JR 3  -MS    Standing up from a chair using your arms (e.g., wheelchair, bedside chair)? 3  -KH 3  -JR 3  -MS    Climbing 3-5 steps with a railing? 3  -KH 3  -JR 3  -MS    To walk in hospital room? 3  -KH 3  -JR 3  -MS    AM-PAC 6 Clicks Score 19  -KH 19  -JR 18  -MS    Functional Assessment    Outcome Measure Options AM-PAC 6 Clicks Basic Mobility (PT)  -KH AM-PAC 6 Clicks Basic Mobility (PT)  -JR AM-PAC 6 Clicks Basic Mobility (PT)  -MS      User Key  (r) = Recorded By, (t) = Taken By, (c) = Cosigned By    Initials Name Provider Type    MS Isaac Atkinson, PT Physical Therapist    JR Rasheed White, PT Physical Therapist    SUMMER Flowers, PT Physical Therapist           Time Calculation:         PT Charges       02/04/18 1555          Time Calculation    Start Time 1545  -      Stop Time 1558  -      Time Calculation  (min) 13 min  -SUMMER      PT Received On 02/04/18  -SUMMER      PT - Next Appointment 02/05/18  -SUMMER        User Key  (r) = Recorded By, (t) = Taken By, (c) = Cosigned By    Initials Name Provider Type    SUMMER Flowers PT Physical Therapist          Therapy Charges for Today     Code Description Service Date Service Provider Modifiers Qty    27674888147 HC PT THER PROC EA 15 MIN 2/4/2018 Divya Flowers, PT GP 1    00410945863 HC PT THER SUPP EA 15 MIN 2/4/2018 Divya Flowers PT GP 1          PT G-Codes  Outcome Measure Options: AM-PAC 6 Clicks Basic Mobility (PT)    Divya Flowers PT  2/4/2018

## 2018-02-04 NOTE — PROGRESS NOTES
"  Name: Rogelio Sher  ADMIT: 2018   Age/Sex: 72 y.o.male LOS:  LOS: 2 days    :    1945     ROOM: [facility]   MRN:    [id number]    PCP:    Naeem Garcia MD     Subjective   Doing better. Still fairly weak when getting up. No sob or cough    Objective   Vital Signs  Temp:  [96.1 °F (35.6 °C)-97.2 °F (36.2 °C)] 96.1 °F (35.6 °C)  Heart Rate:  [70-75] 73  Resp:  [18] 18  BP: (111-175)/(71-98) 139/89  Body mass index is 33.42 kg/(m^2).    Objective:  General Appearance:  Comfortable and well-appearing.    Vital signs: (most recent): Blood pressure 139/89, pulse 73, temperature 96.1 °F (35.6 °C), temperature source Oral, resp. rate 18, height 172.7 cm (68\"), weight 99.7 kg (219 lb 12.8 oz), SpO2 93 %.  Vital signs are normal.    HEENT: Normal HEENT exam.    Lungs:  Normal effort.  Breath sounds clear to auscultation.    Heart: Normal rate.  Regular rhythm.    Abdomen: Abdomen is soft and non-distended.  Bowel sounds are normal.   There is no abdominal tenderness.     Extremities: There is no deformity or dependent edema.    Neurological: Patient is alert.  (Oriented x2).    Skin:  Warm and dry.  No rash.             Results Review:       I reviewed the patient's new clinical results.    Results from last 7 days  Lab Units 18  0443 18   WBC 10*3/mm3 6.01 9.29   HEMOGLOBIN g/dL 12.7* 13.8   PLATELETS 10*3/mm3 102* 136*     Results from last 7 days  Lab Units 18  0455 18   SODIUM mmol/L  --  140 140   POTASSIUM mmol/L 3.5 3.0* 3.3*   CHLORIDE mmol/L  --  101 99   CO2 mmol/L  --  25.5 32.1*   BUN mg/dL  --  13 15   CREATININE mg/dL  --  1.08 1.25   GLUCOSE mg/dL  --  100* 99   Estimated Creatinine Clearance: 69.7 mL/min (by C-G formula based on Cr of 1.08).  Results from last 7 days  Lab Units 18  0455 18  0443 18   CALCIUM mg/dL  --  8.4* 9.3   ALBUMIN g/dL  --   --  4.50   MAGNESIUM mg/dL 1.9 1.8  --        RADIOLOGY  " 2/4/2018  Pending      aspirin 81 mg Oral Daily   atorvastatin 20 mg Oral Daily   enoxaparin 40 mg Subcutaneous Nightly   valsartan 160 mg Oral Q24H   And      hydrochlorothiazide 12.5 mg Oral Daily   memantine 10 mg Oral Q12H   multivitamin with minerals 1 tablet Oral Daily   oseltamivir 75 mg Oral Q12H   pantoprazole 40 mg Oral QAM   potassium chloride 40 mEq Oral BID With Meals   potassium chloride 40 mEq Oral Once   pramipexole 1 mg Oral TID   rivastigmine 6 mg Oral BID With Meals   vitamin C 500 mg Oral Daily      Diet Regular      Assessment/Plan   Assessment:   Principal Problem:    Influenza A  Active Problems:    Generalized weakness    Parkinson's disease    Essential hypertension        Plan:   1. Influenza A with dehydration  - cont tamiflu, renally dosed  - prn albuterol nebs ordered      2. Encephalopathy with underlying Parkinson's dementia  - cont Parkinson's meds  - encephalopathy improving, almost back to baseline   - he is still very weak and requiring a lot of assistance with ambulation. I think he would definitely benefit from some subacute rehab before returning to assisted living facility. Will ask CCP to help with this      3. HTN  - home meds restarted   - BP ok          Disposition  Hopefully will be medically ready by tomorrow to go to SNF      Flako Martinez MD  [facility]  02/04/18  9:50 AM

## 2018-02-04 NOTE — PROGRESS NOTES
Continued Stay Note  [facility]     Patient Name: Rogelio Sher  MRN: [id number]  Today's Date: 2/4/2018    Admit Date: 2/1/2018          Discharge Plan       02/04/18 1701    Case Management/Social Work Plan    Plan Rehab, accepted at [facility] and bed available Monday per [place]. [facility] unable to accept as pt. still on Tamiflu. Await eval by Mayra..........Eileen DRAPER     Patient/Family In Agreement With Plan yes    Additional Comments Call back from [facility], states colud not accept at this time as pt. has not yet completed course of Tamilflu. Call back from [place]/[facility] states she has reviewed information and they can accept and will follow-up with CCP tmorrow as anticipate bed availability. CCP will follow....................Eileen DRAPER               Discharge Codes     None            Trinity Garcia RN

## 2018-02-04 NOTE — PLAN OF CARE
Problem: Patient Care Overview (Adult)  Goal: Plan of Care Review  Outcome: Ongoing (interventions implemented as appropriate)   02/04/18 7961   Coping/Psychosocial Response Interventions   Plan Of Care Reviewed With patient;daughter   Patient Care Overview   Progress improving   Outcome Evaluation   Outcome Summary/Follow up Plan VSS. Requires frequent reorientation this afternoon. Sets chair alarm and bed alarm off frequently. Appetite good. Had large BM this shift. Ambulated to bathroom several times today. Ambulated in hallway with PT today, see note. Safety maintained. Continue to monitor.     Goal: Adult Individualization and Mutuality  Outcome: Ongoing (interventions implemented as appropriate)    Goal: Discharge Needs Assessment  Outcome: Ongoing (interventions implemented as appropriate)      Problem: Infection, Risk/Actual (Adult)  Goal: Infection Prevention/Resolution  Outcome: Ongoing (interventions implemented as appropriate)      Problem: Fall Risk (Adult)  Goal: Absence of Falls  Outcome: Ongoing (interventions implemented as appropriate)

## 2018-02-04 NOTE — PROGRESS NOTES
Continued Stay Note  [facility]     Patient Name: Rogelio Sher  MRN: [id number]  Today's Date: 2/4/2018    Admit Date: 2/1/2018          Discharge Plan       02/04/18 1226    Case Management/Social Work Plan    Plan Referrals to [facility], [facility] and [facility]; await evals by each facility........................Eileen DRAPER     Patient/Family In Agreement With Plan yes    Additional Comments S/W Dr. Martinez who states plan for rehab now over return to [facility]. S/W Dtr Susan, introduced self and role. Dtr states she would like [facility], [facility] or [facility]. Referrals sent in EPIC and  left for Makeda/[facility], Jas/[facility], and Liza/[facility]; await return calls/evals. Pt.'s dtr informed we will update when we have any new information. Per MD pt. will be ready for DC tomorrow, this was relayed in voicemails left for each facility rep. CCP to follow..............Eileen DRAPER               Discharge Codes     None            Trinity Garcia, BONNY

## 2018-02-04 NOTE — PLAN OF CARE
Problem: Patient Care Overview (Adult)  Goal: Plan of Care Review  Outcome: Ongoing (interventions implemented as appropriate)   02/04/18 0610   Coping/Psychosocial Response Interventions   Plan Of Care Reviewed With patient   Patient Care Overview   Progress progress towards functional goals is fair   Outcome Evaluation   Outcome Summary/Follow up Plan Pt continues to get OOB without assist & despite bed alarm. BP elevated around 2am, but back to normal for patient. Denies pain. Safety maintained, will continue to montior.       Problem: Infection, Risk/Actual (Adult)  Goal: Infection Prevention/Resolution  Outcome: Ongoing (interventions implemented as appropriate)      Problem: Fall Risk (Adult)  Goal: Identify Related Risk Factors and Signs and Symptoms  Outcome: Outcome(s) achieved Date Met: 02/04/18    Goal: Absence of Falls  Outcome: Ongoing (interventions implemented as appropriate)

## 2018-02-05 VITALS
TEMPERATURE: 97.4 F | HEART RATE: 85 BPM | DIASTOLIC BLOOD PRESSURE: 97 MMHG | SYSTOLIC BLOOD PRESSURE: 114 MMHG | OXYGEN SATURATION: 95 % | BODY MASS INDEX: 33.31 KG/M2 | HEIGHT: 68 IN | WEIGHT: 219.8 LBS | RESPIRATION RATE: 20 BRPM

## 2018-02-05 PROCEDURE — 97110 THERAPEUTIC EXERCISES: CPT

## 2018-02-05 RX ORDER — OSELTAMIVIR PHOSPHATE 75 MG/1
75 CAPSULE ORAL EVERY 12 HOURS SCHEDULED
Qty: 3 CAPSULE | Refills: 0 | Status: SHIPPED | OUTPATIENT
Start: 2018-02-05 | End: 2018-02-06

## 2018-02-05 RX ORDER — OSELTAMIVIR PHOSPHATE 75 MG/1
75 CAPSULE ORAL EVERY 12 HOURS SCHEDULED
Qty: 3 CAPSULE | Refills: 0 | Status: SHIPPED | OUTPATIENT
Start: 2018-02-05 | End: 2018-02-05

## 2018-02-05 RX ADMIN — RIVASTIGMINE TARTRATE 6 MG: 3 CAPSULE ORAL at 09:58

## 2018-02-05 RX ADMIN — ATORVASTATIN CALCIUM 20 MG: 20 TABLET, FILM COATED ORAL at 09:58

## 2018-02-05 RX ADMIN — PANTOPRAZOLE SODIUM 40 MG: 40 TABLET, DELAYED RELEASE ORAL at 09:58

## 2018-02-05 RX ADMIN — VALSARTAN 160 MG: 160 TABLET ORAL at 09:58

## 2018-02-05 RX ADMIN — OXYCODONE HYDROCHLORIDE AND ACETAMINOPHEN 500 MG: 500 TABLET ORAL at 09:58

## 2018-02-05 RX ADMIN — MULTIPLE VITAMINS W/ MINERALS TAB 1 TABLET: TAB at 09:58

## 2018-02-05 RX ADMIN — PRAMIPEXOLE DIHYDROCHLORIDE 1 MG: 1 TABLET ORAL at 15:29

## 2018-02-05 RX ADMIN — HYDROCHLOROTHIAZIDE 12.5 MG: 25 TABLET ORAL at 09:57

## 2018-02-05 RX ADMIN — MEMANTINE HYDROCHLORIDE 10 MG: 10 TABLET, FILM COATED ORAL at 09:58

## 2018-02-05 RX ADMIN — ASPIRIN 81 MG: 81 TABLET ORAL at 09:59

## 2018-02-05 RX ADMIN — OSELTAMIVIR PHOSPHATE 75 MG: 75 CAPSULE ORAL at 09:57

## 2018-02-05 RX ADMIN — POTASSIUM CHLORIDE 40 MEQ: 750 CAPSULE, EXTENDED RELEASE ORAL at 09:57

## 2018-02-05 RX ADMIN — PRAMIPEXOLE DIHYDROCHLORIDE 1 MG: 1 TABLET ORAL at 09:58

## 2018-02-05 NOTE — PLAN OF CARE
Problem: Patient Care Overview (Adult)  Goal: Plan of Care Review  Outcome: Ongoing (interventions implemented as appropriate)   02/04/18 2106 02/05/18 0516   Coping/Psychosocial Response Interventions   Plan Of Care Reviewed With patient --    Patient Care Overview   Progress --  no change   Outcome Evaluation   Outcome Summary/Follow up Plan --  VSS. Pt anxious to go home. Large BM early this morning. Pt confused and requiring frequent reorientation. Safety maintained. Ambulated to bathroom. Continue to monitor.      Goal: Adult Individualization and Mutuality  Outcome: Ongoing (interventions implemented as appropriate)    Goal: Discharge Needs Assessment  Outcome: Ongoing (interventions implemented as appropriate)      Problem: Infection, Risk/Actual (Adult)  Goal: Infection Prevention/Resolution  Outcome: Ongoing (interventions implemented as appropriate)      Problem: Fall Risk (Adult)  Goal: Absence of Falls  Outcome: Ongoing (interventions implemented as appropriate)

## 2018-02-05 NOTE — PROGRESS NOTES
"Continued Stay Note  [facility]     Patient Name: Rogelio Sher  MRN: [id number]  Today's Date: 2/5/2018    Admit Date: 2/1/2018          Discharge Plan       02/05/18 1027    Case Management/Social Work Plan    Plan [facility] with personal care giver    Additional Comments Spoke with Jagruti GALICIA from [facility]  Pt is walking 400 feet and has no skillable need.  Spoke with pt daughter Susan.  [phone].  Explained to her that patient has no skilled need and Medicare would not pay.  She reported that she was confused because nursing has reported he \"almost fell\" and was unsteady on his feet.  Informed her he walked 400 feet with Pt.  Daughter stated he would return to [facility] but she was concerned that he was unsteady and afraid he might fall  CCP suggested that pt obain a personal care aid until he was less weak from his bout with flu.  Susan reported he has a low level of PCA now and might be able to increase his need.  requested she return call after talking with them and CCp would notify Dr Thomason patient was ready for DC  Spoke with [facility] about [facility] and they cannot accept pt as he is not skillable.              Discharge Codes     None            Mihaela Bush RN    "

## 2018-02-05 NOTE — PLAN OF CARE
Problem: Patient Care Overview (Adult)  Goal: Plan of Care Review  Outcome: Ongoing (interventions implemented as appropriate)   02/05/18 1024   Coping/Psychosocial Response Interventions   Plan Of Care Reviewed With patient   Outcome Evaluation   Outcome Summary/Follow up Plan Pt tolerated PT well. Still unsteady and requires HHA for stability. Cueing required for increased step length w/ gait.

## 2018-02-05 NOTE — PLAN OF CARE
Problem: Patient Care Overview (Adult)  Goal: Plan of Care Review  Outcome: Ongoing (interventions implemented as appropriate)   02/05/18 1132   Coping/Psychosocial Response Interventions   Plan Of Care Reviewed With patient   Patient Care Overview   Progress no change   Outcome Evaluation   Outcome Summary/Follow up Plan VSS. Confused at times. No c/o pain or SOA. Safety maintained, continue to monitor.      Goal: Adult Individualization and Mutuality  Outcome: Ongoing (interventions implemented as appropriate)    Goal: Discharge Needs Assessment  Outcome: Ongoing (interventions implemented as appropriate)      Problem: Infection, Risk/Actual (Adult)  Goal: Infection Prevention/Resolution  Outcome: Ongoing (interventions implemented as appropriate)      Problem: Fall Risk (Adult)  Goal: Absence of Falls  Outcome: Ongoing (interventions implemented as appropriate)

## 2018-02-05 NOTE — THERAPY TREATMENT NOTE
Acute Care - Physical Therapy Treatment Note  [facility]     Patient Name: Rogelio Sher  : 1945  MRN: [id number]  Today's Date: 2018  Onset of Illness/Injury or Date of Surgery Date: 18  Date of Referral to PT: 18  Referring Physician: Nehemias Bowen    Admit Date: 2018    Visit Dx:    ICD-10-CM ICD-9-CM   1. Generalized weakness R53.1 780.79   2. Cough R05 786.2   3. Muscle weakness (generalized) M62.81 728.87     Patient Active Problem List   Diagnosis   • Generalized weakness   • Parkinson's disease   • Essential hypertension   • Influenza A               Adult Rehabilitation Note       18 1000 18 1556 18 1331    Rehab Assessment/Intervention    Discipline physical therapy assistant  -RW physical therapist  - physical therapist  -JR    Document Type therapy note (daily note)  -RW therapy note (daily note)  - therapy note (daily note)  -JR    Subjective Information agree to therapy;no complaints  -RW no complaints;agree to therapy  - agree to therapy;complains of;weakness;fatigue  -JR    Patient Effort, Rehab Treatment good  -RW good  - good  -JR    Precautions/Limitations fall precautions  -RW fall precautions   droplet (FLU), isolation, exit alarm  - fall precautions;other (see comments)   SHUFFLES FEET AS HE AMBULATES.   -JR    Recorded by [RW] Makeda Bales PTA [KH] Divya Flowers, PT [JR] Rasheed White, PT    Vital Signs    O2 Delivery Pre Treatment  room air  -KH room air  -JR    Recorded by  [KH] Divya Flowers, PT [JR] Rasheed White PT    Pain Assessment    Pain Assessment No/denies pain  -RW No/denies pain  - No/denies pain  -JR    Recorded by [RW] Makeda Bales PTA [KH] Divya Flowers, PT [JR] Rasheed White, PT    Cognitive Assessment/Intervention    Current Cognitive/Communication Assessment impaired  -RW functional  - functional  -JR    Orientation Status oriented x 4  -RW oriented x 4;other (see comments)  -KH oriented x  4;other (see comments)   DOES NOT VERBALIZE A LOT.   -JR    Follows Commands/Answers Questions 100% of the time  -% of the time  - 100% of the time;able to follow multi-step instructions;needs increased time;needs cueing  -JR    Personal Safety mild impairment  -RW mild impairment  -KH mild impairment  -JR    Personal Safety Interventions fall prevention program maintained;gait belt;nonskid shoes/slippers when out of bed  -RW fall prevention program maintained;gait belt;nonskid shoes/slippers when out of bed;supervised activity  - gait belt;muscle strengthening facilitated;nonskid shoes/slippers when out of bed  -JR    Recorded by [RW] Makeda Bales, PTA [KH] Divya Flowers, PT [JR] Rasheed White, PT    Bed Mobility, Assessment/Treatment    Bed Mobility, Assistive Device head of bed elevated  -RW      Bed Mobility, Roll Right, [place] contact guard assist;verbal cues required  -RW      Bed Mobility, Scoot/Bridge, [place] not tested  -RW not tested  - contact guard assist  -JR    Bed Mob, Supine to Sit, [place] contact guard assist;verbal cues required  -RW not tested  - minimum assist (75% patient effort);verbal cues required;nonverbal cues required (demo/gesture)  -JR    Bed Mob, Sit to Supine, [place]   minimum assist (75% patient effort);nonverbal cues required (demo/gesture);verbal cues required  -JR    Bed Mobility, Impairments strength decreased  -RW      Bed Mobility, Comment  patient up in chair  -     Recorded by [RW] Makeda Bales, PTA [KH] Divya Flowers, PT [JR] Rasheed White, PT    Transfer Assessment/Treatment    Transfers, Sit-Stand [place] contact guard assist  -RW supervision required  - minimum assist (75% patient effort);nonverbal cues required (demo/gesture);verbal cues required  -JR    Transfers, Stand-Sit [place] contact guard assist  -RW supervision required  - nonverbal cues required (demo/gesture);hand held assist;minimum assist (75%  patient effort)  -JR    Transfer, Safety Issues weight-shifting ability decreased  -RW      Transfer, Impairments strength decreased;impaired balance  -RW impaired balance  -KH impaired balance  -JR    Transfer, Comment  posterior trunk lean when first standing  -KH     Recorded by [RW] Makeda Bales PTA [KH] Divya Flowers, PT [JR] Rasheed White, PT    Gait Assessment/Treatment    Gait, [place] Level contact guard assist;minimum assist (75% patient effort);hand held assist;verbal cues required  -RW nonverbal cues required (demo/gesture);contact guard assist  -KH nonverbal cues required (demo/gesture);contact guard assist  -JR    Gait, Assistive Device  --   HHA  -KH other (see comments)   HANDHELD ASSISTANCE  -JR    Gait, Distance (Feet) 350  -  -  -JR    Gait, Gait Deviations forward flexed posture;bilateral:;odalys decreased;decreased heel strike;narrow base;step length decreased;stride length decreased;toe-to-floor clearance decreased  -RW odalys decreased;forward flexed posture;step length decreased;stride length decreased;weight-shifting ability decreased  -KH odalys decreased;forward flexed posture;step length decreased;stride length decreased  -JR    Gait, Safety Issues balance decreased during turns;step length decreased;weight-shifting ability decreased  -RW step length decreased;weight-shifting ability decreased;balance decreased during turns  -KH step length decreased;sequencing ability decreased  -JR    Gait, Impairments strength decreased;impaired balance  -RW impaired balance;strength decreased  -KH impaired balance;strength decreased  -JR    Gait, Comment HHA required. 2 LOB requiring Lissy to recover. Cueing required for increased step length  -RW Patient able to ambulate a good distance but demonstrates multiple bouts of unsteadiness during gait that cause concern for patient to return to prior living situation  -KH     Recorded by [RW] Makeda Bales PTA [KH] Divya Flowers, PT  [JR] Rasheed White, PT    Balance Skills Training    Standing-Level of Assistance   Minimum assistance  -JR    Static Standing Balance Support   No upper extremity supported  -JR    Standing-Balance Activities   Weight Shift A-P  -JR    Standing Balance # of Minutes   2   STOOD IN ORDER TO HAVE NEW BRIEF DONNED.    -JR    Recorded by   [JR] Rasheed White, PT    Positioning and Restraints    Pre-Treatment Position other (comment)   sitting at EOb  -RW sitting in chair/recliner  -KH in bed  -JR    Post Treatment Position bed  -RW chair  -KH bed  -JR    In Bed fowlers;call light within reach;encouraged to call for assist;exit alarm on;with family/caregiver  -RW  notified nsg;supine;call light within reach;encouraged to call for assist;legs elevated  -JR    In Chair  sitting;call light within reach;encouraged to call for assist;exit alarm on  -KH     Recorded by [RW] Makeda Bales, PTA [KH] Divya Flowers, PT [JR] Rasheed White, PT      User Key  (r) = Recorded By, (t) = Taken By, (c) = Cosigned By    Initials Name Effective Dates    JR Rasheed White, PT 01/07/16 -     KH Divya Flowers, PT 06/22/16 -     RW Makeda Bales, PTA 04/06/16 -                 IP PT Goals       02/02/18 1339          Bed Mobility PT LTG    Bed Mobility PT LTG, Date Established 02/02/18  -MS      Bed Mobility PT LTG, Time to Achieve 5 - 7 days  -MS      Bed Mobility PT LTG, Activity Type all bed mobility  -MS      Bed Mobility PT LTG, [place] Level independent  -MS      Transfer Training PT LTG    Transfer Training PT LTG, Date Established 02/02/18  -MS      Transfer Training PT LTG, Time to Achieve 5 - 7 days  -MS      Transfer Training PT LTG, Activity Type all transfers  -MS      Transfer Training PT LTG, [place] Level independent  -MS      Gait Training PT LTG    Gait Training Goal PT LTG, Date Established 02/02/18  -MS      Gait Training Goal PT LTG, Time to Achieve 5 - 7 days  -MS      Gait Training Goal PT LTG,  [place] Level independent  -MS      Gait Training Goal PT LTG, Distance to Achieve 300 feet  -MS        User Key  (r) = Recorded By, (t) = Taken By, (c) = Cosigned By    Initials Name Provider Type    MS Isaac Adleryder, PT Physical Therapist          Physical Therapy Education     Title: PT OT SLP Therapies (Done)     Topic: Physical Therapy (Done)     Point: Mobility training (Done)    Learning Progress Summary    Learner Readiness Method Response Comment Documented by Status   Patient Acceptance E,TB,D VU,NR   02/05/18 1024 Done    Acceptance E [facility] 02/04/18 1601 Done    Eager E RADHA,VU   02/03/18 1335 Done    Acceptance E,D VU,NR  MS 02/02/18 1339 Done               Point: Home exercise program (Done)    Learning Progress Summary    Learner Readiness Method Response Comment Documented by Status   Patient Eager E DU,VU   02/03/18 1335 Done    Acceptance E,D VU,NR  MS 02/02/18 1339 Done               Point: Body mechanics (Done)    Learning Progress Summary    Learner Readiness Method Response Comment Documented by Status   Patient Acceptance E,TB,D VU,NR   02/05/18 1024 Done    Acceptance E [facility] 02/04/18 1601 Done    Eager E RADHA,VU   02/03/18 1335 Done    Acceptance E,D VU,NR  MS 02/02/18 1339 Done               Point: Precautions (Done)    Learning Progress Summary    Learner Readiness Method Response Comment Documented by Status   Patient Acceptance E,TB,D VU,NR   02/05/18 1024 Done    Acceptance E [facility] 02/04/18 1601 Done    Eager E RADHA,VU   02/03/18 1335 Done    Acceptance E,D VU,NR  MS 02/02/18 1339 Done                      User Key     Initials Effective Dates Name Provider Type Discipline    MS 12/01/15 -  Isaac Atkinson, PT Physical Therapist PT     01/07/16 -  Rasheed White, PT Physical Therapist PT     06/22/16 -  Divya Flowers, PT Physical Therapist PT     04/06/16 -  Makeda Bales, PTA Physical Therapy Assistant PT                    PT Recommendation and  Plan  Anticipated Discharge Disposition: home with assist, home with home health  Planned Therapy Interventions: balance training, bed mobility training, gait training, home exercise program, patient/family education, postural re-education, strengthening, transfer training  PT Frequency: daily  Plan of Care Review  Plan Of Care Reviewed With: patient  Outcome Summary/Follow up Plan: Pt tolerated PT well. Still unsteady and requires HHA for stability. Cueing required for increased step length w/ gait.           Outcome Measures       02/05/18 1000 02/04/18 1600 02/03/18 1336    How much help from another person do you currently need...    Turning from your back to your side while in flat bed without using bedrails? 4  -RW 4  -KH 4  -JR    Moving from lying on back to sitting on the side of a flat bed without bedrails? 3  -RW 3  -KH 3  -JR    Moving to and from a bed to a chair (including a wheelchair)? 3  -RW 3  -KH 3  -JR    Standing up from a chair using your arms (e.g., wheelchair, bedside chair)? 3  -RW 3  -KH 3  -JR    Climbing 3-5 steps with a railing? 3  -RW 3  -KH 3  -JR    To walk in hospital room? 3  -RW 3  -KH 3  -JR    AM-PAC 6 Clicks Score 19  -RW 19  -KH 19  -JR    Functional Assessment    Outcome Measure Options AM-PAC 6 Clicks Basic Mobility (PT)  -RW AM-PAC 6 Clicks Basic Mobility (PT)  -KH AM-PAC 6 Clicks Basic Mobility (PT)  -JR      02/02/18 1300          How much help from another person do you currently need...    Turning from your back to your side while in flat bed without using bedrails? 3  -MS      Moving from lying on back to sitting on the side of a flat bed without bedrails? 3  -MS      Moving to and from a bed to a chair (including a wheelchair)? 3  -MS      Standing up from a chair using your arms (e.g., wheelchair, bedside chair)? 3  -MS      Climbing 3-5 steps with a railing? 3  -MS      To walk in hospital room? 3  -MS      AM-PAC 6 Clicks Score 18  -MS      Functional Assessment     Outcome Measure Options AM-PAC 6 Clicks Basic Mobility (PT)  -MS        User Key  (r) = Recorded By, (t) = Taken By, (c) = Cosigned By    Initials Name Provider Type    MS Isaac Atkinson, PT Physical Therapist    JR Rasheed White, PT Physical Therapist    SUMMER Flowers, PT Physical Therapist    OSWALD Bales PTA Physical Therapy Assistant           Time Calculation:         PT Charges       02/05/18 1023          Time Calculation    Start Time 0958  -RW      Stop Time 1017  -RW      Time Calculation (min) 19 min  -RW      PT Received On 02/05/18  -      PT - Next Appointment 02/06/18  -        User Key  (r) = Recorded By, (t) = Taken By, (c) = Cosigned By    Initials Name Provider Type     Makeda Bales PTA Physical Therapy Assistant          Therapy Charges for Today     Code Description Service Date Service Provider Modifiers Qty    45096584168 HC PT THER PROC EA 15 MIN 2/5/2018 Makeda Bales PTA GP 1          PT G-Codes  Outcome Measure Options: -PAC 6 Clicks Basic Mobility (PT)    Makeda Bales PTA  2/5/2018

## 2018-02-05 NOTE — PROGRESS NOTES
"  Name: Rogelio Sher  ADMIT: 2018   Age/Sex: 72 y.o.male LOS:  LOS: 3 days    :    1945     ROOM: [facility]   MRN:    [id number]    PCP:    Naeem Garcia MD     Subjective   Did ok overnight. Had BM. No complaints this am. No sob.     Objective   Vital Signs  Temp:  [96.4 °F (35.8 °C)-97.4 °F (36.3 °C)] 97.4 °F (36.3 °C)  Heart Rate:  [73-90] 90  Resp:  [18] 18  BP: (112-161)/(69-95) 161/95  Body mass index is 33.42 kg/(m^2).    Objective:  General Appearance:  Comfortable and well-appearing.    Vital signs: (most recent): Blood pressure 161/95, pulse 90, temperature 97.4 °F (36.3 °C), temperature source Oral, resp. rate 18, height 172.7 cm (68\"), weight 99.7 kg (219 lb 12.8 oz), SpO2 94 %.  Vital signs are normal.    HEENT: Normal HEENT exam.    Lungs:  Normal effort.  Breath sounds clear to auscultation.    Heart: Normal rate.  Regular rhythm.    Abdomen: Abdomen is soft and non-distended.  Bowel sounds are normal.   There is no abdominal tenderness.     Extremities: Normal range of motion.  There is no deformity or dependent edema.    Neurological: Patient is alert.  (Oriented x2).    Skin:  Warm and dry.  No rash.             Results Review:       I reviewed the patient's new clinical results.    Results from last 7 days  Lab Units 18  0443 02/01/18  2016   WBC 10*3/mm3 6.01 9.29   HEMOGLOBIN g/dL 12.7* 13.8   PLATELETS 10*3/mm3 102* 136*     Results from last 7 days  Lab Units 18  0455 18   SODIUM mmol/L  --  140 140   POTASSIUM mmol/L 3.5 3.0* 3.3*   CHLORIDE mmol/L  --  101 99   CO2 mmol/L  --  25.5 32.1*   BUN mg/dL  --  13 15   CREATININE mg/dL  --  1.08 1.25   GLUCOSE mg/dL  --  100* 99   Estimated Creatinine Clearance: 69.7 mL/min (by C-G formula based on Cr of 1.08).  Results from last 7 days  Lab Units 18  0455 18  0443 18   CALCIUM mg/dL  --  8.4* 9.3   ALBUMIN g/dL  --   --  4.50   MAGNESIUM mg/dL 1.9 1.8  --        RADIOLOGY "  2/5/2018  Pending      aspirin 81 mg Oral Daily   atorvastatin 20 mg Oral Daily   enoxaparin 40 mg Subcutaneous Nightly   valsartan 160 mg Oral Q24H   And      hydrochlorothiazide 12.5 mg Oral Daily   memantine 10 mg Oral Q12H   multivitamin with minerals 1 tablet Oral Daily   oseltamivir 75 mg Oral Q12H   pantoprazole 40 mg Oral QAM   potassium chloride 40 mEq Oral BID With Meals   potassium chloride 40 mEq Oral Once   pramipexole 1 mg Oral TID   rivastigmine 6 mg Oral BID With Meals   vitamin C 500 mg Oral Daily      Diet Regular      Assessment/Plan   Assessment:   Principal Problem:    Influenza A  Active Problems:    Generalized weakness    Parkinson's disease    Essential hypertension        Plan:   1. Influenza A with dehydration  - cont tamiflu, day 4/5. He sis still on tamiflu however I do not feel he is still contagious at this point  - prn albuterol nebs ordered      2. Encephalopathy with underlying Parkinson's dementia  - cont Parkinson's meds  - encephalopathy resolved  - he is still very weak and requiring a lot of assistance with ambulation. I think he would definitely benefit from some subacute rehab before returning to assisted living facility.       3. HTN  - home meds restarted   - BP a little high      Disposition  Today if rehab bed available.      Flako Martinez MD  [facility]  02/05/18  8:11 AM

## 2018-02-08 NOTE — PROGRESS NOTES
Case Management Discharge Note    Final Note: Returned to his AL apartment at [place]  [email or web address] from [place] came to evaluate patient and stated he could return with higher level of Personal Care.    Discharge Placement     Facility/Agency Request Status Selected? Address Phone Number Fax Number    [facility] [place] Accepted    Yes [address], [facility] [address] [phone]    [facility] P[facility] - Request Sent     [address] [facility] [address] [phone]    [facility] [place] Declined     [address] [address] [phone]    [facility] Declined     [address] , [facility] [address] [phone]        Other: Other    Discharge Codes: CC  Home with HHA with [facility]

## 2021-02-18 NOTE — DISCHARGE SUMMARY
"       Date of Admission: 2/1/2018  Date of Discharge:  2/5/2018    PCP: Naeem Garcia MD      DISCHARGE DIAGNOSIS  Principal Problem:    Influenza A  Active Problems:    Generalized weakness    Parkinson's disease    Essential hypertension      SECONDARY DIAGNOSES  Past Medical History:   Diagnosis Date   • Chorioretinitis     histoplasmosis left eye   • GERD (gastroesophageal reflux disease)    • Hyperlipidemia    • Hypertension    • Kidney stone    • Parkinson's disease        CONSULTS   Consults     Date and Time Order Name Status Description    2/1/2018 [facility] (on-call MD unless specified) Completed           PROCEDURES PERFORMED  CXR:  Cardiomegaly. No definite acute process is identified within the chest.    HOSPITAL COURSE  Patient is a 72 y.o. male presented to [facility] complaining of weakness and confusion along with sore throat, cough and fever.  Please see the admitting history and physical for further details. He did not have any pneumonia seen on CXR. His RVP was positive for Influenza A. He was started on Tamiflu and some IVF. His mental status improved each day and he was back to baseline by the time of discharge. He worked with PT and likely would have benefited from some subacute rehab but the insurance company did not agree. He will be sent out with 1 more day of Tamiflu and home health for PT. He had been afebrile and asymptomatic from a respiratory standpoint since admission. He was not considered to be contagious from a flu perspective and is felt safe to return back to his assisted living facility.         CONDITION ON DISCHARGE  Stable.      VITAL SIGNS  /88  Pulse 91  Temp 96.7 °F (35.9 °C) (Oral)   Resp 18  Ht 172.7 cm (68\")  Wt 99.7 kg (219 lb 12.8 oz)  SpO2 95%  BMI 33.42 kg/m2  Objective:  General Appearance:  Comfortable and well-appearing.    Vital signs: (most recent): Blood pressure 133/88, pulse 91, temperature 96.7 °F (35.9 °C), temperature source " "Oral, resp. rate 18, height 172.7 cm (68\"), weight 99.7 kg (219 lb 12.8 oz), SpO2 95 %.  Vital signs are normal.    HEENT: Normal HEENT exam.    Lungs:  Normal effort.  Breath sounds clear to auscultation.    Heart: Normal rate.  Regular rhythm.    Abdomen: Abdomen is soft and non-distended.  Bowel sounds are normal.   There is no abdominal tenderness.     Extremities: There is no deformity or dependent edema.    Neurological: Patient is alert.  (Oriented x2).    Skin:  Warm and dry.  No rash.               DISCHARGE DISPOSITION   Home or Self Care      DISCHARGE MEDICATIONS   Rogelio Sher   Home Medication Instructions GENOVEVA:[id number]    Printed on:02/05/18 1123   Medication Information                      aspirin 81 MG EC tablet  Take 81 mg by mouth Daily.             atorvastatin (LIPITOR) 20 MG tablet  Take 20 mg by mouth Daily.             memantine (NAMENDA XR) 28 MG capsule sustained-release 24 hr extended release capsule  Take 28 mg by mouth Daily.             Multiple Vitamins-Minerals (MULTIVITAL PO)  Take 1 tablet by mouth Daily.             omeprazole (priLOSEC) 20 MG capsule  Take 20 mg by mouth Daily.             oseltamivir (TAMIFLU) 75 MG capsule  Take 1 capsule by mouth Every 12 (Twelve) Hours for 1 day. Indications: Influenza A             pramipexole (MIRAPEX) 1 MG tablet  Take 1 mg by mouth 3 (Three) Times a Day.             rivastigmine (EXELON) 6 MG capsule  Take 6 mg by mouth 2 (Two) Times a Day.             valsartan-hydrochlorothiazide (DIOVAN-HCT) 160-12.5 MG per tablet  Take 1 tablet by mouth Daily.             vitamin C (ASCORBIC ACID) 500 MG tablet  Take 500 mg by mouth Daily.                No future appointments.  Additional Instructions for the Follow-ups that You Need to Schedule     Ambulatory Referral to Home Health    As directed    Face to Face Visit Date:  2/5/2018    Follow-up Provider for Plan of Care?:  I treated the patient in an acute care facility and will not " independent continue treatment after discharge.    Follow-up Provider:  HUSSAIN GARCIA [[id number]]    Reason/Clinical Findings:  weakness    Describe mobility limitations that make leaving home difficult:  weakness    Nursing/Therapeutic Services Requested:  Physical Therapy    PT orders:  Therapeutic exercise Gait Training Transfer training Strengthening    Weight Bearing Status:  Full Weight Bearing    Frequency:  1 Week 1                 Follow-up Information     Follow up with Hussain Garcia MD .    Specialty:  Internal Medicine    Contact information:    [address] [address] [phone]          Follow up with [facility] [place] [facility] [place] .    Specialty:  Home Health Services    Contact information:    [address] [address]            TEST  RESULTS PENDING AT DISCHARGE         Flako Martinez MD  [facility]  02/05/18  11:26 AM      Time: greater than 30 minutes.      Dragon disclaimer:  Much of this encounter note is an electronic transcription/translation of spoken language to printed text. The electronic translation of spoken language may permit erroneous, or at times, nonsensical words or phrases to be inadvertently transcribed; Although I have reviewed the note for such errors, some may still exist.

## 2021-11-03 ENCOUNTER — IMMUNIZATION (OUTPATIENT)
Dept: VACCINE CLINIC | Facility: HOSPITAL | Age: 76
End: 2021-11-03

## 2021-11-03 PROCEDURE — 91300 HC SARSCOV02 VAC 30MCG/0.3ML IM: CPT | Performed by: INTERNAL MEDICINE

## 2021-11-03 PROCEDURE — 0004A ADM SARSCOV2 30MCG/0.3ML BOOSTER: CPT | Performed by: INTERNAL MEDICINE

## 2021-11-10 ENCOUNTER — APPOINTMENT (OUTPATIENT)
Dept: GENERAL RADIOLOGY | Facility: HOSPITAL | Age: 76
End: 2021-11-10

## 2021-11-10 ENCOUNTER — APPOINTMENT (OUTPATIENT)
Dept: CT IMAGING | Facility: HOSPITAL | Age: 76
End: 2021-11-10

## 2021-11-10 ENCOUNTER — HOSPITAL ENCOUNTER (INPATIENT)
Facility: HOSPITAL | Age: 76
LOS: 4 days | Discharge: NURSING, REHAB OR LONG-TERM CARE | End: 2021-11-15
Attending: EMERGENCY MEDICINE | Admitting: HOSPITALIST

## 2021-11-10 DIAGNOSIS — R41.82 ALTERED MENTAL STATUS, UNSPECIFIED ALTERED MENTAL STATUS TYPE: Primary | ICD-10-CM

## 2021-11-10 DIAGNOSIS — G20 PARKINSON'S DISEASE (HCC): ICD-10-CM

## 2021-11-10 DIAGNOSIS — R31.9 HEMATURIA, UNSPECIFIED TYPE: ICD-10-CM

## 2021-11-10 DIAGNOSIS — R26.2 AMBULATORY DYSFUNCTION: ICD-10-CM

## 2021-11-10 PROBLEM — G93.41 METABOLIC ENCEPHALOPATHY: Status: ACTIVE | Noted: 2021-11-10

## 2021-11-10 LAB
ALBUMIN SERPL-MCNC: 4.6 G/DL (ref 3.5–5.2)
ALBUMIN/GLOB SERPL: 1.9 G/DL
ALP SERPL-CCNC: 99 U/L (ref 39–117)
ALT SERPL W P-5'-P-CCNC: 10 U/L (ref 1–41)
ANION GAP SERPL CALCULATED.3IONS-SCNC: 14.3 MMOL/L (ref 5–15)
AST SERPL-CCNC: 69 U/L (ref 1–40)
BACTERIA UR QL AUTO: ABNORMAL /HPF
BASOPHILS # BLD AUTO: 0.03 10*3/MM3 (ref 0–0.2)
BASOPHILS NFR BLD AUTO: 0.3 % (ref 0–1.5)
BILIRUB SERPL-MCNC: 0.8 MG/DL (ref 0–1.2)
BILIRUB UR QL STRIP: NEGATIVE
BUN SERPL-MCNC: 21 MG/DL (ref 8–23)
BUN/CREAT SERPL: 19.1 (ref 7–25)
CALCIUM SPEC-SCNC: 9 MG/DL (ref 8.6–10.5)
CHLORIDE SERPL-SCNC: 106 MMOL/L (ref 98–107)
CLARITY UR: CLEAR
CO2 SERPL-SCNC: 24.7 MMOL/L (ref 22–29)
COLOR UR: YELLOW
CREAT SERPL-MCNC: 1.1 MG/DL (ref 0.76–1.27)
DEPRECATED RDW RBC AUTO: 44.6 FL (ref 37–54)
EOSINOPHIL # BLD AUTO: 0.09 10*3/MM3 (ref 0–0.4)
EOSINOPHIL NFR BLD AUTO: 0.8 % (ref 0.3–6.2)
ERYTHROCYTE [DISTWIDTH] IN BLOOD BY AUTOMATED COUNT: 12.5 % (ref 12.3–15.4)
GFR SERPL CREATININE-BSD FRML MDRD: 65 ML/MIN/1.73
GLOBULIN UR ELPH-MCNC: 2.4 GM/DL
GLUCOSE BLDC GLUCOMTR-MCNC: 102 MG/DL (ref 70–130)
GLUCOSE SERPL-MCNC: 97 MG/DL (ref 65–99)
GLUCOSE UR STRIP-MCNC: NEGATIVE MG/DL
HCT VFR BLD AUTO: 43.8 % (ref 37.5–51)
HGB BLD-MCNC: 14.8 G/DL (ref 13–17.7)
HGB UR QL STRIP.AUTO: ABNORMAL
HOLD SPECIMEN: NORMAL
HOLD SPECIMEN: NORMAL
HYALINE CASTS UR QL AUTO: ABNORMAL /LPF
IMM GRANULOCYTES # BLD AUTO: 0.06 10*3/MM3 (ref 0–0.05)
IMM GRANULOCYTES NFR BLD AUTO: 0.5 % (ref 0–0.5)
INR PPP: 1.08 (ref 0.9–1.1)
KETONES UR QL STRIP: ABNORMAL
LEUKOCYTE ESTERASE UR QL STRIP.AUTO: NEGATIVE
LYMPHOCYTES # BLD AUTO: 2.1 10*3/MM3 (ref 0.7–3.1)
LYMPHOCYTES NFR BLD AUTO: 18.8 % (ref 19.6–45.3)
MAGNESIUM SERPL-MCNC: 2.2 MG/DL (ref 1.6–2.4)
MCH RBC QN AUTO: 32.9 PG (ref 26.6–33)
MCHC RBC AUTO-ENTMCNC: 33.8 G/DL (ref 31.5–35.7)
MCV RBC AUTO: 97.3 FL (ref 79–97)
MONOCYTES # BLD AUTO: 0.95 10*3/MM3 (ref 0.1–0.9)
MONOCYTES NFR BLD AUTO: 8.5 % (ref 5–12)
NEUTROPHILS NFR BLD AUTO: 7.94 10*3/MM3 (ref 1.7–7)
NEUTROPHILS NFR BLD AUTO: 71.1 % (ref 42.7–76)
NITRITE UR QL STRIP: NEGATIVE
NRBC BLD AUTO-RTO: 0 /100 WBC (ref 0–0.2)
PH UR STRIP.AUTO: 6.5 [PH] (ref 5–8)
PLATELET # BLD AUTO: 163 10*3/MM3 (ref 140–450)
PMV BLD AUTO: 8.7 FL (ref 6–12)
POTASSIUM SERPL-SCNC: 3.8 MMOL/L (ref 3.5–5.2)
PROT SERPL-MCNC: 7 G/DL (ref 6–8.5)
PROT UR QL STRIP: ABNORMAL
PROTHROMBIN TIME: 13.8 SECONDS (ref 11.7–14.2)
RBC # BLD AUTO: 4.5 10*6/MM3 (ref 4.14–5.8)
RBC # UR: ABNORMAL /HPF
REF LAB TEST METHOD: ABNORMAL
SARS-COV-2 RNA PNL SPEC NAA+PROBE: NOT DETECTED
SODIUM SERPL-SCNC: 145 MMOL/L (ref 136–145)
SP GR UR STRIP: 1.02 (ref 1–1.03)
SQUAMOUS #/AREA URNS HPF: ABNORMAL /HPF
TROPONIN T SERPL-MCNC: <0.01 NG/ML (ref 0–0.03)
UROBILINOGEN UR QL STRIP: ABNORMAL
WBC # BLD AUTO: 11.17 10*3/MM3 (ref 3.4–10.8)
WBC UR QL AUTO: ABNORMAL /HPF
WHOLE BLOOD HOLD SPECIMEN: NORMAL
WHOLE BLOOD HOLD SPECIMEN: NORMAL

## 2021-11-10 PROCEDURE — P9612 CATHETERIZE FOR URINE SPEC: HCPCS

## 2021-11-10 PROCEDURE — 85025 COMPLETE CBC W/AUTO DIFF WBC: CPT | Performed by: EMERGENCY MEDICINE

## 2021-11-10 PROCEDURE — G0378 HOSPITAL OBSERVATION PER HR: HCPCS

## 2021-11-10 PROCEDURE — 93010 ELECTROCARDIOGRAM REPORT: CPT | Performed by: INTERNAL MEDICINE

## 2021-11-10 PROCEDURE — 99285 EMERGENCY DEPT VISIT HI MDM: CPT

## 2021-11-10 PROCEDURE — 93005 ELECTROCARDIOGRAM TRACING: CPT | Performed by: EMERGENCY MEDICINE

## 2021-11-10 PROCEDURE — 71045 X-RAY EXAM CHEST 1 VIEW: CPT

## 2021-11-10 PROCEDURE — 85610 PROTHROMBIN TIME: CPT | Performed by: EMERGENCY MEDICINE

## 2021-11-10 PROCEDURE — 81001 URINALYSIS AUTO W/SCOPE: CPT | Performed by: EMERGENCY MEDICINE

## 2021-11-10 PROCEDURE — 87635 SARS-COV-2 COVID-19 AMP PRB: CPT | Performed by: EMERGENCY MEDICINE

## 2021-11-10 PROCEDURE — 70450 CT HEAD/BRAIN W/O DYE: CPT

## 2021-11-10 PROCEDURE — 84484 ASSAY OF TROPONIN QUANT: CPT | Performed by: EMERGENCY MEDICINE

## 2021-11-10 PROCEDURE — 82962 GLUCOSE BLOOD TEST: CPT

## 2021-11-10 PROCEDURE — 80053 COMPREHEN METABOLIC PANEL: CPT | Performed by: EMERGENCY MEDICINE

## 2021-11-10 PROCEDURE — 83735 ASSAY OF MAGNESIUM: CPT | Performed by: EMERGENCY MEDICINE

## 2021-11-10 RX ORDER — SODIUM CHLORIDE 0.9 % (FLUSH) 0.9 %
10 SYRINGE (ML) INJECTION AS NEEDED
Status: DISCONTINUED | OUTPATIENT
Start: 2021-11-10 | End: 2021-11-15 | Stop reason: HOSPADM

## 2021-11-10 RX ORDER — CHOLECALCIFEROL (VITAMIN D3) 125 MCG
500 CAPSULE ORAL DAILY
COMMUNITY

## 2021-11-11 LAB
GLUCOSE BLDC GLUCOMTR-MCNC: 82 MG/DL (ref 70–130)
GLUCOSE BLDC GLUCOMTR-MCNC: 87 MG/DL (ref 70–130)
GLUCOSE BLDC GLUCOMTR-MCNC: 91 MG/DL (ref 70–130)
GLUCOSE BLDC GLUCOMTR-MCNC: 93 MG/DL (ref 70–130)
GLUCOSE BLDC GLUCOMTR-MCNC: 95 MG/DL (ref 70–130)
QT INTERVAL: 355 MS

## 2021-11-11 PROCEDURE — 97535 SELF CARE MNGMENT TRAINING: CPT

## 2021-11-11 PROCEDURE — 92610 EVALUATE SWALLOWING FUNCTION: CPT

## 2021-11-11 PROCEDURE — 82962 GLUCOSE BLOOD TEST: CPT

## 2021-11-11 PROCEDURE — 97166 OT EVAL MOD COMPLEX 45 MIN: CPT

## 2021-11-11 PROCEDURE — 97530 THERAPEUTIC ACTIVITIES: CPT

## 2021-11-11 PROCEDURE — 99222 1ST HOSP IP/OBS MODERATE 55: CPT | Performed by: STUDENT IN AN ORGANIZED HEALTH CARE EDUCATION/TRAINING PROGRAM

## 2021-11-11 PROCEDURE — 97162 PT EVAL MOD COMPLEX 30 MIN: CPT

## 2021-11-11 RX ORDER — ACETAMINOPHEN 650 MG/1
650 SUPPOSITORY RECTAL EVERY 4 HOURS PRN
Status: DISCONTINUED | OUTPATIENT
Start: 2021-11-10 | End: 2021-11-15 | Stop reason: HOSPADM

## 2021-11-11 RX ORDER — PANTOPRAZOLE SODIUM 40 MG/1
40 TABLET, DELAYED RELEASE ORAL EVERY MORNING
Status: DISCONTINUED | OUTPATIENT
Start: 2021-11-11 | End: 2021-11-15 | Stop reason: HOSPADM

## 2021-11-11 RX ORDER — RIVASTIGMINE TARTRATE 1.5 MG/1
6 CAPSULE ORAL 2 TIMES DAILY WITH MEALS
Status: DISCONTINUED | OUTPATIENT
Start: 2021-11-11 | End: 2021-11-15 | Stop reason: HOSPADM

## 2021-11-11 RX ORDER — ONDANSETRON 2 MG/ML
4 INJECTION INTRAMUSCULAR; INTRAVENOUS EVERY 6 HOURS PRN
Status: DISCONTINUED | OUTPATIENT
Start: 2021-11-10 | End: 2021-11-15 | Stop reason: HOSPADM

## 2021-11-11 RX ORDER — SODIUM CHLORIDE 0.9 % (FLUSH) 0.9 %
10 SYRINGE (ML) INJECTION EVERY 12 HOURS SCHEDULED
Status: DISCONTINUED | OUTPATIENT
Start: 2021-11-11 | End: 2021-11-15 | Stop reason: HOSPADM

## 2021-11-11 RX ORDER — MEMANTINE HYDROCHLORIDE 10 MG/1
10 TABLET ORAL EVERY 12 HOURS SCHEDULED
Status: DISCONTINUED | OUTPATIENT
Start: 2021-11-11 | End: 2021-11-15 | Stop reason: HOSPADM

## 2021-11-11 RX ORDER — SODIUM CHLORIDE 9 MG/ML
100 INJECTION, SOLUTION INTRAVENOUS CONTINUOUS
Status: DISCONTINUED | OUTPATIENT
Start: 2021-11-11 | End: 2021-11-13

## 2021-11-11 RX ORDER — NITROGLYCERIN 0.4 MG/1
0.4 TABLET SUBLINGUAL
Status: DISCONTINUED | OUTPATIENT
Start: 2021-11-10 | End: 2021-11-15 | Stop reason: HOSPADM

## 2021-11-11 RX ORDER — ASPIRIN 81 MG/1
81 TABLET ORAL DAILY
Status: DISCONTINUED | OUTPATIENT
Start: 2021-11-11 | End: 2021-11-15 | Stop reason: HOSPADM

## 2021-11-11 RX ORDER — SODIUM CHLORIDE 0.9 % (FLUSH) 0.9 %
10 SYRINGE (ML) INJECTION AS NEEDED
Status: DISCONTINUED | OUTPATIENT
Start: 2021-11-10 | End: 2021-11-15 | Stop reason: HOSPADM

## 2021-11-11 RX ORDER — ACETAMINOPHEN 325 MG/1
650 TABLET ORAL EVERY 4 HOURS PRN
Status: DISCONTINUED | OUTPATIENT
Start: 2021-11-10 | End: 2021-11-15 | Stop reason: HOSPADM

## 2021-11-11 RX ORDER — ACETAMINOPHEN 160 MG/5ML
650 SOLUTION ORAL EVERY 4 HOURS PRN
Status: DISCONTINUED | OUTPATIENT
Start: 2021-11-10 | End: 2021-11-15 | Stop reason: HOSPADM

## 2021-11-11 RX ADMIN — SODIUM CHLORIDE, PRESERVATIVE FREE 10 ML: 5 INJECTION INTRAVENOUS at 20:21

## 2021-11-11 RX ADMIN — SODIUM CHLORIDE 100 ML/HR: 9 INJECTION, SOLUTION INTRAVENOUS at 00:52

## 2021-11-11 RX ADMIN — SODIUM CHLORIDE, PRESERVATIVE FREE 10 ML: 5 INJECTION INTRAVENOUS at 15:21

## 2021-11-11 RX ADMIN — SODIUM CHLORIDE 100 ML/HR: 9 INJECTION, SOLUTION INTRAVENOUS at 10:44

## 2021-11-11 RX ADMIN — MEMANTINE HYDROCHLORIDE 10 MG: 10 TABLET, FILM COATED ORAL at 20:21

## 2021-11-11 RX ADMIN — RIVASTIGMINE TARTRATE 6 MG: 1.5 CAPSULE ORAL at 16:25

## 2021-11-11 RX ADMIN — CARBIDOPA AND LEVODOPA 1 TABLET: 25; 100 TABLET ORAL at 16:26

## 2021-11-11 RX ADMIN — PRAMIPEXOLE DIHYDROCHLORIDE 0.75 MG: 0.5 TABLET ORAL at 16:23

## 2021-11-11 RX ADMIN — PRAMIPEXOLE DIHYDROCHLORIDE 0.75 MG: 0.5 TABLET ORAL at 20:20

## 2021-11-11 RX ADMIN — SODIUM CHLORIDE, PRESERVATIVE FREE 10 ML: 5 INJECTION INTRAVENOUS at 00:53

## 2021-11-11 NOTE — THERAPY EVALUATION
Patient Name: Rogelio Sher  : 1945    MRN: [id number]                              Today's Date: 2021       Admit Date: 11/10/2021    Visit Dx:     ICD-10-CM ICD-9-CM   1. Altered mental status, unspecified altered mental status type  R41.82 780.97   2. Parkinson's disease (HCC)  G20 332.0   3. Hematuria, unspecified type  R31.9 599.70   4. Ambulatory dysfunction  R26.2 719.7     Patient Active Problem List   Diagnosis   • Generalized weakness   • Parkinson's disease (HCC)   • Essential hypertension   • Influenza A   • Altered mental status   • Metabolic encephalopathy     Past Medical History:   Diagnosis Date   • Chorioretinitis     histoplasmosis left eye   • GERD (gastroesophageal reflux disease)    • Hyperlipidemia    • Hypertension    • Kidney stone    • Parkinson's disease (HCC)      Past Surgical History:   Procedure Laterality Date   • CATARACT EXTRACTION, BILATERAL     • COLONOSCOPY     • KIDNEY STONE SURGERY     • TONSILLECTOMY        General Information     Row Name 21 1307          OT Time and Intention    Document Type evaluation  -BL     Mode of Treatment individual therapy; occupational therapy  -     Row Name 21 1307          General Information    Patient Profile Reviewed yes  -BL     Prior Level of Function independent:; ADL's; feeding; grooming; dressing; bathing  PLOF obtained by son-in law who was present this date.  -BL     Existing Precautions/Restrictions fall  -BL     Barriers to Rehab cognitive status; previous functional deficit; medically complex  -     Row Name 21 1307          Living Environment    Lives With facility resident  -     Row Name 21 1307          Cognition    Orientation Status (Cognition) oriented to; person  -     Row Name 21 1307          Safety Issues, Functional Mobility    Safety Issues Affecting Function (Mobility) ability to follow commands; insight into deficits/self-awareness; awareness of need for assistance   -     Impairments Affecting Function (Mobility) cognition; endurance/activity tolerance; strength; range of motion (ROM); motor control; motor planning; coordination; postural/trunk control  -     Cognitive Impairments, Mobility Safety/Performance awareness, need for assistance; insight into deficits/self-awareness  -           User Key  (r) = Recorded By, (t) = Taken By, (c) = Cosigned By    Initials Name Provider Type     Mary Bradford OT Occupational Therapist                 Mobility/ADL's     Row Name 11/11/21 1311          Bed Mobility    Rolling Left [place] (Bed Mobility) unable to assess  -BL     Rolling Right [place] (Bed Mobility) unable to assess  -BL     Supine-Sit [place] (Bed Mobility) unable to assess  -BL     Sit-Supine [place] (Bed Mobility) unable to assess  -BL     Comment (Bed Mobility) NT-not safe as pt unable to stay aroused and difficulty following commands this date.  -     Row Name 11/11/21 1311          Activities of Daily Living    BADL Assessment/Intervention grooming  -[facility] [place] Name 11/11/21 1311          Grooming Assessment/Training    [place] Level (Grooming) wash face, hands; minimum assist (75% patient effort)  -     Assistive Devices (Grooming) hand over hand  -     Position (Grooming) supine  -           User Key  (r) = Recorded By, (t) = Taken By, (c) = Cosigned By    Initials Name Provider Type    Mary Zendejas OT Occupational Therapist               Obj/Interventions     Row Name 11/11/21 1313          Sensory Assessment (Somatosensory)    Sensory Assessment (Somatosensory) unable/difficult to assess  -[facility] [place] Name 11/11/21 1313          Vision Assessment/Intervention    Visual Impairment/Limitations unable/difficult to assess  -[facility] [place] Name 11/11/21 1313          Range of Motion Comprehensive    General Range of Motion bilateral upper extremity ROM WFL  -     Row Name 11/11/21 1313          Strength Comprehensive  (MMT)    Comment, General Manual Muscle Testing (MMT) Assessment unable to follow commands for formal assessment but pt resistant this date and demonstrates 3/5 BUE strength  -     Row Name 11/11/21 1313          Motor Skills    Motor Skills coordination  -     Coordination moderate impairment; bilateral; upper extremity; severe impairment  -           User Key  (r) = Recorded By, (t) = Taken By, (c) = Cosigned By    Initials Name Provider Type    BL Mary Bradford OT Occupational Therapist               Goals/Plan     Row Name 11/11/21 1321          Bed Mobility Goal 1 (OT)    Activity/Assistive Device (Bed Mobility Goal 1, OT) bed mobility activities, all  -BL     [place] Level/Cues Needed (Bed Mobility Goal 1, OT) minimum assist (75% or more patient effort)  -BL     Time Frame (Bed Mobility Goal 1, OT) short term goal (STG); 2 weeks  -BL     Progress/Outcomes (Bed Mobility Goal 1, OT) continuing progress toward goal  -     Row Name 11/11/21 1321          Transfer Goal 1 (OT)    Activity/Assistive Device (Transfer Goal 1, OT) transfers, all  -BL     [place] Level/Cues Needed (Transfer Goal 1, OT) minimum assist (75% or more patient effort)  -BL     Time Frame (Transfer Goal 1, OT) short term goal (STG); 2 weeks  -BL     Progress/Outcome (Transfer Goal 1, OT) continuing progress toward goal  -[facility] Name 11/11/21 1321          Dressing Goal 1 (OT)    Activity/Device (Dressing Goal 1, OT) dressing skills, all  -BL     [place]/Cues Needed (Dressing Goal 1, OT) moderate assist (50-74% patient effort)  -BL     Time Frame (Dressing Goal 1, OT) short term goal (STG); 2 weeks  -BL     Progress/Outcome (Dressing Goal 1, OT) continuing progress toward goal  -     Row Name 11/11/21 1321          Grooming Goal 1 (OT)    Activity/Device (Grooming Goal 1, OT) grooming skills, all  -BL     [place] (Grooming Goal 1, OT) set-up required  -BL     Time Frame (Grooming Goal 1, OT) short term goal  (STG); 2 weeks  -BL     Progress/Outcome (Grooming Goal 1, OT) continuing progress toward goal  -BL     Row Name 11/11/21 1321          Strength Goal 1 (OT)    Strength Goal 1 (OT) Pt will increase BUE strength to 3+/5 prior to D/C.  -BL     Time Frame (Strength Goal 1, OT) short term goal (STG); 2 weeks  -BL     Progress/Outcome (Strength Goal 1, OT) continuing progress toward goal  -BL     Row Name 11/11/21 1321          Therapy Assessment/Plan (OT)    Planned Therapy Interventions (OT) activity tolerance training; occupation/activity based interventions; IADL retraining; strengthening exercise; transfer/mobility retraining; patient/caregiver education/training  -BL           User Key  (r) = Recorded By, (t) = Taken By, (c) = Cosigned By    Initials Name Provider Type    BL Mary Bradford, OT Occupational Therapist               Clinical Impression     Row Name 11/11/21 1314          Pain Assessment    Additional Documentation Pain Scale: Numbers Pre/Post-Treatment (Group)  -BL     Row Name 11/11/21 1314          Pain Scale: Numbers Pre/Post-Treatment    Pretreatment Pain Rating 7/10  -BL     Posttreatment Pain Rating 7/10  -BL     Pain Location - Orientation lower  -BL     Pain Location back  -BL     Row Name 11/11/21 1314          Plan of Care Review    Plan of Care Reviewed With patient; other (see comments)  son-in law  -BL     Progress no change  -BL     Outcome Summary Pt seen by OT this date for evaluation. Pt is a 77 y/o male admit to [facility] for metabolic encephalopathy, generalized weakness and AMS from outside facility. Pt's son-in law present for evaluation and states that pt is independent with ADLs at baseline and does not use any AD for functional mobility. Upon arrival pt lying supine in bed, 7/10 pain in low back, A&O x1. Pt very lethargic this date and unable to stay aroused even with sternal rubs pt would open eyes for a shrt time and then close them. Pt was able to complete grooming task in  supine with Min A with noted BUEs tremors which creates difficulty with completing task. Pt resistant when assessing BUEs this date and unable to follow commands. Pt left in supine, needs in reach, alarm on. Pt to benefit from skilled OT services to address endurance, BUE strength, functional mobility, and ADL task completion. OT rec D/C back to facility when medically appropriate. OT wore mask, gloves, glasses, hand hygiene performed.  -BL     Row Name 11/11/21 1314          Therapy Assessment/Plan (OT)    Rehab Potential (OT) fair, will monitor progress closely  -BL     Criteria for Skilled Therapeutic Interventions Met (OT) skilled treatment is necessary; yes  -BL     Therapy Frequency (OT) 5 times/wk  -BL     Row Name 11/11/21 1314          Therapy Plan Review/Discharge Plan (OT)    Anticipated Discharge Disposition (OT) [facility] nursing [facility]  -     Row Name 11/11/21 1314          Vital Signs    Pre Patient Position Supine  -BL     Intra Patient Position Supine  -BL     Post Patient Position Supine  -BL     Row Name 11/11/21 1314          Positioning and Restraints    Pre-Treatment Position in bed  -BL     Post Treatment Position bed  -BL     In Bed supine; call light within reach; encouraged to call for assist; exit alarm on; with family/caregiver  -           User Key  (r) = Recorded By, (t) = Taken By, (c) = Cosigned By    Initials Name Provider Type    Mary Zendejas, MACKENZIE Occupational Therapist               Outcome Measures     Row Name 11/11/21 1324          How much help from another is currently needed...    Putting on and taking off regular lower body clothing? 2  -BL     Bathing (including washing, rinsing, and drying) 2  -BL     Toileting (which includes using toilet bed pan or urinal) 1  -BL     Putting on and taking off regular upper body clothing 3  -BL     Taking care of personal grooming (such as brushing teeth) 3  -BL     Eating meals 3  -BL     AM-PAC 6 Clicks Score (OT) 14  -BL      Row Name 11/11/21 1117          How much help from another person do you currently need...    Turning from your back to your side while in flat bed without using bedrails? 2  -AE     Moving from lying on back to sitting on the side of a flat bed without bedrails? 2  -AE     Moving to and from a bed to a chair (including a wheelchair)? 1  -AE     Standing up from a chair using your arms (e.g., wheelchair, bedside chair)? 2  -AE     Climbing 3-5 steps with a railing? 1  -AE     To walk in hospital room? 1  -AE     AM-PAC 6 Clicks Score (PT) 9  -AE     Row Name 11/11/21 1324          Modified [place] Scale    Modified Erik Scale 4 - Moderately severe disability.  Unable to walk without assistance, and unable to attend to own bodily needs without assistance.  -BL     Row Name 11/11/21 1324 11/11/21 1117       Functional Assessment    Outcome Measure Options AM-PAC 6 Clicks Daily Activity (OT); Modified Erik  -BL AM-PAC 6 Clicks Basic Mobility (PT)  -AE          User Key  (r) = Recorded By, (t) = Taken By, (c) = Cosigned By    Initials Name Provider Type    AE Bhavya Dickens, PT Physical Therapist    Mary Zendejas OT Occupational Therapist                Occupational Therapy Education                 Title: PT OT SLP Therapies (In Progress)     Topic: Occupational Therapy (In Progress)     Point: ADL training (Done)     Description:   Instruct learner(s) on proper safety adaptation and remediation techniques during self care or transfers.   Instruct in proper use of assistive devices.              Learning Progress Summary           Patient Acceptance, E, VU by  at 11/11/2021 1324    Comment: the role of OT                   Point: Home exercise program (Not Started)     Description:   Instruct learner(s) on appropriate technique for monitoring, assisting and/or progressing therapeutic exercises/activities.              Learner Progress:  Not documented in this visit.          Point: Precautions (Not  Started)     Description:   Instruct learner(s) on prescribed precautions during self-care and functional transfers.              Learner Progress:  Not documented in this visit.          Point: Body mechanics (Not Started)     Description:   Instruct learner(s) on proper positioning and spine alignment during self-care, functional mobility activities and/or exercises.              Learner Progress:  Not documented in this visit.                      User Key     Initials Effective Dates Name Provider Type Discipline     01/05/21 -  Mary Bradford OT Occupational Therapist OT              OT Recommendation and Plan  Planned Therapy Interventions (OT): activity tolerance training, occupation/activity based interventions, IADL retraining, strengthening exercise, transfer/mobility retraining, patient/caregiver education/training  Therapy Frequency (OT): 5 times/wk  Plan of Care Review  Plan of Care Reviewed With: patient, other (see comments) (son-in law)  Progress: no change  Outcome Summary: Pt seen by OT this date for evaluation. Pt is a 77 y/o male admit to [facility] for metabolic encephalopathy, generalized weakness and AMS from outside facility. Pt's son-in law present for evaluation and states that pt is independent with ADLs at baseline and does not use any AD for functional mobility. Upon arrival pt lying supine in bed, 7/10 pain in low back, A&O x1. Pt very lethargic this date and unable to stay aroused even with sternal rubs pt would open eyes for a shrt time and then close them. Pt was able to complete grooming task in supine with Min A with noted BUEs tremors which creates difficulty with completing task. Pt resistant when assessing BUEs this date and unable to follow commands. Pt left in supine, needs in reach, alarm on. Pt to benefit from skilled OT services to address endurance, BUE strength, functional mobility, and ADL task completion. OT rec D/C back to facility when medically appropriate. OT wore  mask, gloves, glasses, hand hygiene performed.     Time Calculation:    Time Calculation- OT     Row Name 11/11/21 1325             Time Calculation- OT    OT Start Time 1008  -BL      OT Stop Time 1023  -BL      OT Time Calculation (min) 15 min  -BL      Total Timed Code Minutes- OT 8 minute(s)  -BL      OT Received On 11/11/21  -BL      OT - Next Appointment 11/12/21  -BL      OT Goal Re-Cert Due Date 11/25/21  -BL              Timed Charges    87996 - OT Self Care/Mgmt Minutes 8  -BL              Untimed Charges    OT Eval/Re-eval Minutes 7  -BL              Total Minutes    Timed Charges Total Minutes 8  -BL      Untimed Charges Total Minutes 7  -BL       Total Minutes 15  -BL            User Key  (r) = Recorded By, (t) = Taken By, (c) = Cosigned By    Initials Name Provider Type     Mary Bardford OT Occupational Therapist              Therapy Charges for Today     Code Description Service Date Service Provider Modifiers Qty    83062878048 HC OT SELF CARE/MGMT/TRAIN EA 15 MIN 11/11/2021 Mary Bradford OT GO 1    08136584633 HC OT EVAL MOD COMPLEXITY 2 11/11/2021 Mary Bradford OT GO 1               Mary Bradford OT  11/11/2021

## 2021-11-11 NOTE — THERAPY EVALUATION
Patient Name: Rogelio Sher  : 1945    MRN: [id number]                              Today's Date: 2021       Admit Date: 11/10/2021    Visit Dx:     ICD-10-CM ICD-9-CM   1. Altered mental status, unspecified altered mental status type  R41.82 780.97   2. Parkinson's disease (HCC)  G20 332.0   3. Hematuria, unspecified type  R31.9 599.70   4. Ambulatory dysfunction  R26.2 719.7     Patient Active Problem List   Diagnosis   • Generalized weakness   • Parkinson's disease (HCC)   • Essential hypertension   • Influenza A   • Altered mental status   • Metabolic encephalopathy     Past Medical History:   Diagnosis Date   • Chorioretinitis     histoplasmosis left eye   • GERD (gastroesophageal reflux disease)    • Hyperlipidemia    • Hypertension    • Kidney stone    • Parkinson's disease (HCC)      Past Surgical History:   Procedure Laterality Date   • CATARACT EXTRACTION, BILATERAL     • COLONOSCOPY     • KIDNEY STONE SURGERY     • TONSILLECTOMY        General Information     Row Name 21 1112          Physical Therapy Time and Intention    Document Type evaluation  -AE     Mode of Treatment individual therapy; physical therapy  -AE     Row Name 21 1112          General Information    Patient Profile Reviewed yes  -AE     Prior Level of Function independent:  -AE     Existing Precautions/Restrictions fall  -AE     Barriers to Rehab medically complex; cognitive status; previous functional deficit  -AE     Row Name 21 1112          Living Environment    Lives With facility resident  -AE     Row Name 21 1112          Home Main Entrance    Number of Stairs, Main Entrance none  -AE     Row Name 21 1112          Stairs Within Home, Primary    Number of Stairs, Within Home, Primary none  -AE     Row Name 21 1112          Cognition    Orientation Status (Cognition) oriented to; person  -AE     Row Name 21 1112          Safety Issues, Functional Mobility    Safety Issues  Affecting Function (Mobility) ability to follow commands; insight into deficits/self-awareness; awareness of need for assistance; sequencing abilities; positioning of assistive device  -AE     Impairments Affecting Function (Mobility) balance; cognition; coordination; endurance/activity tolerance; motor control; motor planning; pain; postural/trunk control; strength  -AE           User Key  (r) = Recorded By, (t) = Taken By, (c) = Cosigned By    Initials Name Provider Type    AE Bhavya Dickens PT Physical Therapist               Mobility     Row Name 11/11/21 1114          Bed Mobility    Bed Mobility supine-sit; sit-supine; rolling left; rolling right  -AE     Rolling Left [place] (Bed Mobility) minimum assist (75% patient effort); 1 person assist  -AE     Rolling Right [place] (Bed Mobility) minimum assist (75% patient effort); 1 person assist  -AE     Supine-Sit [place] (Bed Mobility) moderate assist (50% patient effort); 2 person assist  -AE     Sit-Supine [place] (Bed Mobility) maximum assist (25% patient effort); 2 person assist  -AE     Assistive Device (Bed Mobility) bed rails; draw sheet; head of bed elevated  -AE     Comment (Bed Mobility) demonstrates fair sitting balance, keeps feet hovered in the air  -AE     Row Name 11/11/21 1114          Transfers    Comment (Transfers) maxA x 2 sit<>stand with handheld assist blocking B feet/knees  -AE           User Key  (r) = Recorded By, (t) = Taken By, (c) = Cosigned By    Initials Name Provider Type    AE Bhavya Dickens PT Physical Therapist               Obj/Interventions     Row Name 11/11/21 1114          Range of Motion Comprehensive    Comment, General Range of Motion BLE grossly WFL  -AE     Row Name 11/11/21 1114          Strength Comprehensive (MMT)    Comment, General Manual Muscle Testing (MMT) Assessment demonstrates at least 3/5; unable to follow commands for formal MMTs  -AE           User Key  (r) = Recorded By, (t) =  Taken By, (c) = Cosigned By    Initials Name Provider Type    AE Bhavya Dickens PT Physical Therapist               Goals/Plan     Row Name 11/11/21 1116          Bed Mobility Goal 1 (PT)    Activity/Assistive Device (Bed Mobility Goal 1, PT) bed mobility activities, all  -AE     [place] Level/Cues Needed (Bed Mobility Goal 1, PT) minimum assist (75% or more patient effort); 1 person assist  -AE     Time Frame (Bed Mobility Goal 1, PT) 2 weeks  -AE     Progress/Outcomes (Bed Mobility Goal 1, PT) continuing progress toward goal  -AE     Row Name 11/11/21 1116          Transfer Goal 1 (PT)    Activity/Assistive Device (Transfer Goal 1, PT) transfers, all  -AE     [place] Level/Cues Needed (Transfer Goal 1, PT) minimum assist (75% or more patient effort); 1 person assist  -AE     Time Frame (Transfer Goal 1, PT) 2 weeks  -AE     Progress/Outcome (Transfer Goal 1, PT) continuing progress toward goal  -AE     Row Name 11/11/21 [facility]6          Gait Training Goal 1 (PT)    Activity/Assistive Device (Gait Training Goal 1, PT) gait (walking locomotion); assistive device use  -AE     [place] Level (Gait Training Goal 1, PT) minimum assist (75% or more patient effort); 2 person assist  -AE     Distance (Gait Training Goal 1, PT) 20ft  -AE     Time Frame (Gait Training Goal 1, PT) 2 weeks  -AE     Progress/Outcome (Gait Training Goal 1, PT) continuing progress toward goal  -AE           User Key  (r) = Recorded By, (t) = Taken By, (c) = Cosigned By    Initials Name Provider Type    AE Bhavya Dickens PT Physical Therapist               Clinical Impression     Row Name 11/11/21 1116          Pain    Additional Documentation Pain Scale: Numbers Pre/Post-Treatment (Group)  -AE     Row Name 11/11/21 1116          Pain Scale: Numbers Pre/Post-Treatment    Pre/Posttreatment Pain Comment unable to complain of pain  -AE     Pain Intervention(s) Repositioned; Ambulation/increased activity; Rest  -AE           User  Key  (r) = Recorded By, (t) = Taken By, (c) = Cosigned By    Initials Name Provider Type    Bhavya Olmedo PT Physical Therapist               Outcome Measures     Row Name 11/11/21 1117          How much help from another person do you currently need...    Turning from your back to your side while in flat bed without using bedrails? 2  -AE     Moving from lying on back to sitting on the side of a flat bed without bedrails? 2  -AE     Moving to and from a bed to a chair (including a wheelchair)? 1  -AE     Standing up from a chair using your arms (e.g., wheelchair, bedside chair)? 2  -AE     Climbing 3-5 steps with a railing? 1  -AE     To walk in hospital room? 1  -AE     AM-PAC 6 Clicks Score (PT) 9  -AE     Row Name 11/11/21 1117          Functional Assessment    Outcome Measure Options AM-PAC 6 Clicks Basic Mobility (PT)  -AE           User Key  (r) = Recorded By, (t) = Taken By, (c) = Cosigned By    Initials Name Provider Type    Bhavya Olmedo PT Physical Therapist                               PT Recommendation and Plan  Planned Therapy Interventions (PT): balance training, bed mobility training, gait training, home exercise program, motor coordination training, neuromuscular re-education, patient/family education, postural re-education, ROM (range of motion), stair training, strengthening, stretching, transfer training  Plan of Care Reviewed With: patient     Time Calculation:    PT Charges     Row Name 11/11/21 1123 11/11/21 1014          Time Calculation    Start Time 0915  -AE --     Stop Time 0945  -AE --     Time Calculation (min) 30 min  -AE --     PT Received On -- 11/11/21  -AE     PT - Next Appointment -- 11/12/21  -AE     PT Goal Re-Cert Due Date 11/25/21  -AE --            Time Calculation- PT    Total Timed Code Minutes- PT 15 minute(s)  -AE --           User Key  (r) = Recorded By, (t) = Taken By, (c) = Cosigned By    Initials Name Provider Type    Bhavya Olmedo PT Physical  Therapist              Therapy Charges for Today     Code Description Service Date Service Provider Modifiers Qty    21109130970 HC PT EVAL MOD COMPLEXITY 2 11/11/2021 Bhavya Dickens, PT GP 1    01451629238 HC PT THERAPEUTIC ACT EA 15 MIN 11/11/2021 Bhavya Dickens, PT GP 1    22753680317 HC PT THER SUPP EA 15 MIN 11/11/2021 Bhavya Dickens, PT GP 2          PT G-Codes  Outcome Measure Options: AM-PAC 6 Clicks Basic Mobility (PT)  AM-PAC 6 Clicks Score (PT): 9    Bhavya Dickens, PT  11/11/2021

## 2021-11-11 NOTE — PLAN OF CARE
Problem: Fall Injury Risk  Goal: Absence of Fall and Fall-Related Injury  Outcome: Ongoing, Progressing  Intervention: Identify and Manage Contributors to Fall Injury Risk  Recent Flowsheet Documentation  Taken 11/10/2021 2247 by Chel Fernandez RN  Medication Review/Management: medications reviewed  Intervention: Promote Injury-Free Environment  Recent Flowsheet Documentation  Taken 11/11/2021 0450 by Chel Fernandez, RN  Safety Promotion/Fall Prevention: safety round/check completed  Taken 11/11/2021 0250 by Chel Fernandez RN  Safety Promotion/Fall Prevention: safety round/check completed  Taken 11/11/2021 0050 by Chel Fernandez RN  Safety Promotion/Fall Prevention: safety round/check completed  Taken 11/10/2021 2247 by Chel Fernandez, RN  Safety Promotion/Fall Prevention:   room organization consistent   safety round/check completed   Goal Outcome Evaluation:

## 2021-11-11 NOTE — CONSULTS
Neurology Consult Note    Consult Date: 11/11/2021    Referring MD: Rasheed Peña MD    Reason for Consult I have been asked to see the patient in neurological consultation to render advice and opinion regarding worsening Parkinson's disease    History from the daughter over the phone and his son-in-law at the bedside, reviewed care everywhere.    Rogelio Sher is a 76 y.o. right-handed white male with past medical history of hypertension, hyperlipidemia, dementia, Parkinson disease for over 10 years, lives at [facility] care unit who presented from the care facility with worsening Parkinson symptoms and frequent falls.  Currently is on Sinemet  3 times daily, Mirapex 0.7 mg 3 times daily which as per the daughter was increased twice  over the past 6 months due to worsening of his symptoms.  Patient also on rivastigmine 6 mg twice daily and memantine 10 mg twice daily for the history of dementia.  As per daughter the patient baseline functioning and is able to dressing himself, feeding himself taking care of basic ADLs, able to walk unassisted with short steps classic Parkinson posture with leaning forward and decreased arm swing but his symptoms got worse recently in the past week or so with 2 falls and worsening of his Parkinson symptoms stiffening of the arms usually 3 to 4 hours after taking the Sinemet.  The patient follows at [facility] with nurse practitioner and his next appointment in March next year and on next appointment he is supposed to see the physician as per his daughter.  His daughter stated that sometimes the patient would have visual hallucination, she denied any personality change that started at this time of his Parkinson disease, she denied orthostatic hypotension ,lightheadedness or dizziness.  She states that he lost sense of smell years prior to his Parkinson disease, she was not sure if he does have constipation.    Past Medical History:   Diagnosis Date   •  Chorioretinitis     histoplasmosis left eye   • GERD (gastroesophageal reflux disease)    • Hyperlipidemia    • Hypertension    • Kidney stone    • Parkinson's disease (HCC)        ROS:  Unable to assess due to patient condition    Exam  /74 (BP Location: Left arm, Patient Position: Lying)   Pulse 54   Temp 97.5 °F (36.4 °C) (Oral)   Resp 18   SpO2 96%   Gen: Flat affect, eyes closed, slowly opens his eyes after calling his name multiple times  MS: Follows commands, soft voice, memory and language not assessed due to patient condition.  CN: visual acuity grossly normal, PERRL, EOMI, no facial droop, no dysarthria  Motor: Moves all extremities against gravity, increased tone throughout with cogwheel rigidity  Abnormal movements: Resting low-frequency tremor on the arms bilaterally more on the left side.  Sensory exam: Grimace to painful stimuli throughout  Coordination, gait not assessed due to patient condition    DATA:    Lab Results   Component Value Date    GLUCOSE 97 11/10/2021    CALCIUM 9.0 11/10/2021     11/10/2021    K 3.8 11/10/2021    CO2 24.7 11/10/2021     11/10/2021    BUN 21 11/10/2021    CREATININE 1.10 11/10/2021    EGFRIFNONA 65 11/10/2021    BCR 19.1 11/10/2021    ANIONGAP 14.3 11/10/2021     Lab Results   Component Value Date    WBC 11.17 (H) 11/10/2021    HGB 14.8 11/10/2021    HCT 43.8 11/10/2021    MCV 97.3 (H) 11/10/2021     11/10/2021       Lab review: Urinalysis negative for infection,  WBC 11.17, sodium 145, BUN 21, creatinine 1.10    Imaging review: I personally reviewed his CT head which showed moderate atrophy throughout with no acute finding.    Diagnoses:  Worsening Parkinson disease  Leukocytosis  Rule out obstructive sleep apnea    Comment: If the patient fails a swallow evaluation I recommend NG tube with medication through the tube.      PLAN:   1.  We will increase Sinemet frequency to 4 times daily, if patient continues to have severe Parkinson  "symptoms we might consider increasing the dose further.  2.  Continue with pramipexole 0.75 mg 3 times daily  3.  Continue with Namenda and rivastigmine home dose  4.  Recommend swallow evaluation  5.  Recommend sleep apnea evaluation as an outpatient  6.  Recommend follow-up with movement disorder neurologist in 1 month after discharge.  I gave instructions to the daughter over the phone to consider following with movement disorder specialist Dr. Bartholomew or Dr. Sewell at [facility].    We will continue to follow and advise.    \"Dictated utilizing Dragon dictation\".      "

## 2021-11-11 NOTE — H&P
Patient Name:  Rogelio Sher  YOB: 1945  MRN:  [id number]  Admit Date:  11/10/2021  Patient Care Team:  Naeem Garcia MD as PCP - General (Internal Medicine)      Subjective   History Present Illness     Chief Complaint   Patient presents with   • Weakness - Generalized     Pt from [place]  generalized weakness over the past 5 days.     History of Present Illness   Mr. Stuart is 76-year-old male with history of Parkinson's disease, hypertension, GERD presents to the emergency room with generalized weakness and altered mental status.  Patient is poor historian, unable to give very much information.  In review the chart, according to the daughter patient has had a pretty sudden onset of generalized weakness and increased confusion.  She reports that patient does have Parkinson's disease and sometimes he does have bad days when he gets stiff and have difficulty moving but today seemed worse and he seemed to be more confused.  According to the daughter the patient was walking down stairs independently at her home last weekend so this sudden inability to walk with acute decline.  He reports no fever or chills, he has no other complaints, no headache, no shortness of breath.  He has had some falls recently but reported no head trauma.  Patient is on Sinemet which is believed that he takes normally because he does live at assisted living that helps him with his medications.  In the emergency room troponin negative, glucose 97, sodium 145, potassium 3.8, creatinine 1.1, BUN 21, magnesium 2.2, INR 1.08, white blood cell count 11.17, hemoglobin 14.8, hematocrit 43.8, platelets 163.  Urinalysis unremarkable.  CT of his head shows moderate diffuse atrophy and chronic small vessel ischemic change, no evidence of acute intracranial hemorrhage or mass-effect.    Review of Systems   Constitutional: Negative for appetite change and fever.   HENT: Negative for nosebleeds and trouble swallowing.     Eyes: Negative for photophobia, redness and visual disturbance.   Respiratory: Negative for cough, chest tightness, shortness of breath and wheezing.    Cardiovascular: Negative for chest pain, palpitations and leg swelling.   Gastrointestinal: Negative for abdominal distention, abdominal pain, nausea and vomiting.   Endocrine: Negative.    Genitourinary: Negative.    Musculoskeletal: Negative for gait problem and joint swelling.   Skin: Negative.    Neurological: Positive for weakness (generalized). Negative for dizziness, seizures, speech difficulty, light-headedness and headaches.   Hematological: Negative.    Psychiatric/Behavioral: Positive for confusion (worsened today according to family). Negative for behavioral problems.        Personal History     Past Medical History:   Diagnosis Date   • Chorioretinitis     histoplasmosis left eye   • GERD (gastroesophageal reflux disease)    • Hyperlipidemia    • Hypertension    • Kidney stone    • Parkinson's disease (CMS/HCC)      Past Surgical History:   Procedure Laterality Date   • CATARACT EXTRACTION, BILATERAL     • COLONOSCOPY     • KIDNEY STONE SURGERY     • TONSILLECTOMY       No family history on file.  Social History     Tobacco Use   • Smoking status: Never Smoker   • Smokeless tobacco: Never Used   Substance Use Topics   • Alcohol use: Yes     Comment: 3 drinks weekly   • Drug use: No     No current facility-administered medications on file prior to encounter.     Current Outpatient Medications on File Prior to Encounter   Medication Sig Dispense Refill   • atorvastatin (LIPITOR) 20 MG tablet Take 20 mg by mouth Daily.     • carbidopa-levodopa (SINEMET)  MG per tablet Take 1 tablet by mouth 3 (Three) Times a Day.     • memantine (NAMENDA XR) 28 MG capsule sustained-release 24 hr extended release capsule Take 10 mg by mouth 2 (Two) Times a Day.     • Multiple Vitamins-Minerals (MULTIVITAL PO) Take 1 tablet by mouth Daily.     • omeprazole (priLOSEC)  20 MG capsule Take 20 mg by mouth Daily.     • pramipexole (MIRAPEX) 1 MG tablet Take 0.7 mg by mouth 3 (Three) Times a Day.     • rivastigmine (EXELON) 6 MG capsule Take 6 mg by mouth 2 (Two) Times a Day.     • vitamin B-12 (CYANOCOBALAMIN) 500 MCG tablet Take 500 mcg by mouth Daily.     • vitamin C (ASCORBIC ACID) 500 MG tablet Take 500 mg by mouth Daily.     • aspirin 81 MG EC tablet Take 81 mg by mouth Daily.     • valsartan-hydrochlorothiazide (DIOVAN-HCT) 160-12.5 MG per tablet Take 1 tablet by mouth Daily.       No Known Allergies    Objective    Objective     Vital Signs  Temp:  [97.3 °F (36.3 °C)-97.8 °F (36.6 °C)] 97.3 °F (36.3 °C)  Heart Rate:  [82] 82  Resp:  [15-18] 15  BP: (125-147)/(73-89) 147/85  SpO2:  [97 %] 97 %  on   ;   Device (Oxygen Therapy): room air  There is no height or weight on file to calculate BMI.    Physical Exam  Vitals and nursing note reviewed.   Constitutional:       General: He is not in acute distress.     Appearance: He is well-developed.   HENT:      Head: Normocephalic.   Neck:      Vascular: No JVD.   Cardiovascular:      Rate and Rhythm: Normal rate and regular rhythm.      Heart sounds: Normal heart sounds.      Comments: NSR on the monitor  Pulmonary:      Effort: Pulmonary effort is normal.      Breath sounds: Normal breath sounds.      Comments: Lung sounds clear, sats 97% on room air  Abdominal:      General: There is no distension.      Palpations: Abdomen is soft.      Tenderness: There is no abdominal tenderness.   Musculoskeletal:         General: Normal range of motion.      Cervical back: Normal range of motion.   Skin:     General: Skin is warm and dry.      Capillary Refill: Capillary refill takes less than 2 seconds.   Neurological:      Mental Status: He is alert.      Comments: Speech is slurred and difficult to understand. He will follow commands but difficult to understand   Psychiatric:         Attention and Perception: Attention normal.         Mood and  Affect: Mood normal.         Speech: Speech normal.         Behavior: Behavior normal.         Cognition and Memory: Cognition normal.         Judgment: Judgment normal.         Results Review:  I reviewed the patient's new clinical results.  I reviewed the patient's new imaging results and agree with the interpretation.  I reviewed the patient's other test results and agree with the interpretation  I personally viewed and interpreted the patient's EKG/Telemetry data  Discussed with ED provider.    Lab Results (last 24 hours)     Procedure Component Value Units Date/Time    CBC & Differential [[id number]]  (Abnormal) Collected: 11/10/21 1941    Specimen: Blood Updated: 11/10/21 2006    Narrative:      The following orders were created for panel order CBC & Differential.  Procedure                               Abnormality         Status                     ---------                               -----------         ------                     CBC Auto Differential[[id number]]        Abnormal            Final result                 Please view results for these tests on the individual orders.    Comprehensive Metabolic Panel [[id number]]  (Abnormal) Collected: 11/10/21 1941    Specimen: Blood Updated: 11/10/21 2029     Glucose 97 mg/dL      BUN 21 mg/dL      Creatinine 1.10 mg/dL      Sodium 145 mmol/L      Potassium 3.8 mmol/L      Chloride 106 mmol/L      CO2 24.7 mmol/L      Calcium 9.0 mg/dL      Total Protein 7.0 g/dL      Albumin 4.60 g/dL      ALT (SGPT) 10 U/L      AST (SGOT) 69 U/L      Alkaline Phosphatase 99 U/L      Total Bilirubin 0.8 mg/dL      eGFR Non African Amer 65 mL/min/1.73      Globulin 2.4 gm/dL      A/G Ratio 1.9 g/dL      BUN/Creatinine Ratio 19.1     Anion Gap 14.3 mmol/L     Narrative:      GFR Normal >60  Chronic Kidney Disease <60  Kidney Failure <15      Troponin [[id number]]  (Normal) Collected: 11/10/21 1941    Specimen: Blood Updated: 11/10/21 2049     Troponin T <0.010 ng/mL      Narrative:      Troponin T Reference Range:  <= 0.03 ng/mL-   Negative for AMI  >0.03 ng/mL-     Abnormal for myocardial necrosis.  Clinicians would have to utilize clinical acumen, EKG, Troponin and serial changes to determine if it is an Acute Myocardial Infarction or myocardial injury due to an underlying chronic condition.       Results may be falsely decreased if patient taking Biotin.      Magnesium [[id number]]  (Normal) Collected: 11/10/21 1941    Specimen: Blood Updated: 11/10/21 2029     Magnesium 2.2 mg/dL     Urinalysis With Microscopic If Indicated (No Culture) - Urine, Catheter [[id number]]  (Abnormal) Collected: 11/10/21 1941    Specimen: Urine, Catheter Updated: 11/10/21 2020     Color, UA Yellow     Appearance, UA Clear     pH, UA 6.5     Specific Gravity, UA 1.022     Glucose, UA Negative     Ketones, UA Trace     Bilirubin, UA Negative     Blood, UA Small (1+)     Protein, UA Trace     Leuk Esterase, UA Negative     Nitrite, UA Negative     Urobilinogen, UA 1.0 E.U./dL    Protime-INR [[id number]]  (Normal) Collected: 11/10/21 1941    Specimen: Blood Updated: 11/10/21 2053     Protime 13.8 Seconds      INR 1.08    CBC Auto Differential [[id number]]  (Abnormal) Collected: 11/10/21 1941    Specimen: Blood Updated: 11/10/21 2006     WBC 11.17 10*3/mm3      RBC 4.50 10*6/mm3      Hemoglobin 14.8 g/dL      Hematocrit 43.8 %      MCV 97.3 fL      MCH 32.9 pg      MCHC 33.8 g/dL      RDW 12.5 %      RDW-SD 44.6 fl      MPV 8.7 fL      Platelets 163 10*3/mm3      Neutrophil % 71.1 %      Lymphocyte % 18.8 %      Monocyte % 8.5 %      Eosinophil % 0.8 %      Basophil % 0.3 %      Immature Grans % 0.5 %      Neutrophils, Absolute 7.94 10*3/mm3      Lymphocytes, Absolute 2.10 10*3/mm3      Monocytes, Absolute 0.95 10*3/mm3      Eosinophils, Absolute 0.09 10*3/mm3      Basophils, Absolute 0.03 10*3/mm3      Immature Grans, Absolute 0.06 10*3/mm3      nRBC 0.0 /100 WBC     Urinalysis, Microscopic Only - Urine,  Catheter [[id number]]  (Abnormal) Collected: 11/10/21 1941    Specimen: Urine, Catheter Updated: 11/10/21 2023     RBC, UA 31-50 /HPF      WBC, UA 0-2 /HPF      Bacteria, UA None Seen /HPF      Squamous Epithelial Cells, UA 0-2 /HPF      Hyaline Casts, UA 0-2 /LPF      Methodology Automated Microscopy    COVID PRE-OP / PRE-PROCEDURE SCREENING ORDER (NO ISOLATION) - Swab, Nasopharynx [[id number]]  (Normal) Collected: 11/10/21 2000    Specimen: Swab from Nasopharynx Updated: 11/10/21 2111    Narrative:      The following orders were created for panel order COVID PRE-OP / PRE-PROCEDURE SCREENING ORDER (NO ISOLATION) - Swab, Nasopharynx.  Procedure                               Abnormality         Status                     ---------                               -----------         ------                     COVID-19,[facility]...[[id number]]  Normal              Final result                 Please view results for these tests on the individual orders.    COVID-19,[facility] CEPHEID/CHESTER NP SWAB IN TRANSPORT MEDIA 8-12 HR TAT - Swab, Nasopharynx [[id number]]  (Normal) Collected: 11/10/21 2000    Specimen: Swab from Nasopharynx Updated: 11/10/21 2111     COVID19 Not Detected    Narrative:      Fact sheet for providers: https://www.fda.gov/media/141120/download    Fact sheet for patients: https://www.fda.gov/media/310617/download    Test performed by PCR.    POC Glucose Once [[id number]]  (Normal) Collected: 11/10/21 2110    Specimen: Blood Updated: 11/10/21 2112     Glucose 102 mg/dL      Comment: Meter: [id number] : [id number] Guillermo Díaz [facility]             Imaging Results (Last 24 Hours)     Procedure Component Value Units Date/Time    CT Head Without Contrast [[id number]] Collected: 11/10/21 2042     Updated: 11/10/21 2049    Narrative:      CT SCAN OF THE BRAIN WITHOUT CONTRAST     HISTORY: Generalized weakness. Dementia. Confusion.     The CT scan was performed as an emergency procedure through the  brain  without contrast. There is moderate diffuse atrophy and chronic small  vessel ischemic change. There is no evidence of acute intracranial  hemorrhage or mass effect. There is some polypoid mucosal thickening or  mucous retention cyst in the right maxillary sinus.                 Radiation dose reduction techniques were utilized, including automated  exposure control and exposure modulation based on body size.     This report was finalized on 11/10/2021 8:46 PM by Dr. Jamal Ang M.D.       XR Chest 1 View [[id number]] Collected: 11/10/21 1747     Updated: 11/10/21 1800    Narrative:      ONE VIEW PORTABLE CHEST AT 4:52 PM     HISTORY: Weakness and dizziness. Kyphosis. Hypertension.     FINDINGS: There is poor lung expansion with some vague vascular crowding  and possible mild vascular congestion compared to the study of  02/01/2018. The heart is enlarged without change.     This report was finalized on 11/10/2021 5:56 PM by Dr. Jamal Ang M.D.                 ECG 12 Lead   Preliminary Result   HEART RATE= 85  bpm   RR Interval= 706  ms   MN Interval= 192  ms   P Horizontal Axis= 6  deg   P Front Axis= 54  deg   QRSD Interval= 82  ms   QT Interval= 355  ms   QRS Axis= -19  deg   T Wave Axis= 41  deg   - BORDERLINE ECG -   Sinus rhythm   Probable left atrial enlargement   Borderline left axis deviation   Abnormal R-wave progression, early transition   Artifact in lead(s) II,III,aVR,aVL,aVF,V1,V2,V5   Electronically Signed By:    Date and Time of Study: 2021-11-10 20:07:01           Assessment/Plan     Active Hospital Problems    Diagnosis  POA   • **Metabolic encephalopathy [G93.41]  Yes   • Altered mental status [R41.82]  Yes   • Parkinson's disease (HCC) [G20]  Yes   • Essential hypertension [I10]  Yes   • Generalized weakness [R53.1]  Yes     Mr. Stuart is 76-year-old male with history of Parkinson's disease, hypertension, GERD presents to the emergency room with generalized weakness and altered  mental status.    Metabolic encephalopathy/generalized weakness/Parkinson's disease  -Neurology consult  -Neurochecks every 4 hours  -Normal saline at 100 cc an hour overnight  -N.p.o. until cleared by speech therapy  -Folate and B12 in the a.m.  -PT to eval and treat  -Continue home medications when med rec available      · I discussed the patient's findings and my recommendations with patient and ED provider.    VTE Prophylaxis - SCDs.  Code Status - Full code.       ELIAZAR Marx  [facility]  11/11/21  01:21 EST

## 2021-11-11 NOTE — PLAN OF CARE
Pt is 76-year-old male with history of Parkinson's disease, hypertension, GERD presents to the emergency room with generalized weakness and altered mental status. Pt oriented to self only, extremely mumbled speech difficult to understand. Per H&P daughter reports he was ambulating without use of AD and able to navigate the stairs in her home. He resides in [facility]. Today patient was modA x 2 to sit on EOB demonstrating fair sitting balance however keeps feet hovered in the air and eyes closed throughout majority of session. Performed 3 episodes of sit<>Stand with maxA x 2 via handheld assist. Had to block B feet/knees due to patient attempting to pick them up off the ground during standing, standing tolerance <15sec. MaxA x 2 to return to supine, able to assist in turning/rolling side<>side with Lissy. Skilled PT needed to address above impairments. DC recs SNU/SAW. Currently unsafe to transfer with RN staff.      Problem: Adult Inpatient Plan of Care  Goal: Plan of Care Review  Outcome: Ongoing, Progressing  Flowsheets (Taken 11/11/2021 1120)  Plan of Care Reviewed With: patient   Goal Outcome Evaluation:  Plan of Care Reviewed With: patient

## 2021-11-11 NOTE — ED NOTES
.  Nursing report ED to floor  Rogelio Sher  76 y.o.  male    HPI :   Chief Complaint   Patient presents with   • Weakness - Generalized     Pt from [facility]  generalized weakness over the past 5 days.       Admitting doctor:   Rasheed Peña MD    Admitting diagnosis:   The primary encounter diagnosis was Altered mental status, unspecified altered mental status type. Diagnoses of Parkinson's disease (HCC), Hematuria, unspecified type, and Ambulatory dysfunction were also pertinent to this visit.    Code status:   Current Code Status     Date Active Code Status Order ID Comments User Context       Prior    Advance Care Planning Activity          Allergies:   Patient has no known allergies.    Intake and Output  No intake or output data in the 24 hours ending 11/10/21 2101    Weight:   There were no vitals filed for this visit.    Most recent vitals:   Vitals:    11/10/21 1622 11/10/21 1941   BP: 130/73 135/89   BP Location: Left arm    Patient Position: Sitting    Pulse: 82    Resp: 18    Temp: 97.8 °F (36.6 °C)    TempSrc: Oral    SpO2: 97%        Active LDAs/IV Access:   Lines, Drains & Airways     Active LDAs     Name Placement date Placement time Site Days    Peripheral IV 11/10/21 1620 Anterior; Distal; Right; Upper Arm 11/10/21  1620  Arm  less than 1                Labs (abnormal labs have a star):   Labs Reviewed   COMPREHENSIVE METABOLIC PANEL - Abnormal; Notable for the following components:       Result Value    AST (SGOT) 69 (*)     All other components within normal limits    Narrative:     GFR Normal >60  Chronic Kidney Disease <60  Kidney Failure <15     URINALYSIS W/ MICROSCOPIC IF INDICATED (NO CULTURE) - Abnormal; Notable for the following components:    Ketones, UA Trace (*)     Blood, UA Small (1+) (*)     Protein, UA Trace (*)     All other components within normal limits   CBC WITH AUTO DIFFERENTIAL - Abnormal; Notable for the following components:    WBC 11.17 (*)     MCV 97.3 (*)      Lymphocyte % 18.8 (*)     Neutrophils, Absolute 7.94 (*)     Monocytes, Absolute 0.95 (*)     Immature Grans, Absolute 0.06 (*)     All other components within normal limits   URINALYSIS, MICROSCOPIC ONLY - Abnormal; Notable for the following components:    RBC, UA 31-50 (*)     All other components within normal limits   TROPONIN (IN-HOUSE) - Normal    Narrative:     Troponin T Reference Range:  <= 0.03 ng/mL-   Negative for AMI  >0.03 ng/mL-     Abnormal for myocardial necrosis.  Clinicians would have to utilize clinical acumen, EKG, Troponin and serial changes to determine if it is an Acute Myocardial Infarction or myocardial injury due to an underlying chronic condition.       Results may be falsely decreased if patient taking Biotin.     MAGNESIUM - Normal   PROTIME-INR - Normal   COVID PRE-OP / PRE-PROCEDURE SCREENING ORDER (NO ISOLATION)    Narrative:     The following orders were created for panel order COVID PRE-OP / PRE-PROCEDURE SCREENING ORDER (NO ISOLATION) - Swab, Nasopharynx.  Procedure                               Abnormality         Status                     ---------                               -----------         ------                     COVID-19,[facility]...[[id number]]                      In process                   Please view results for these tests on the individual orders.   COVID-19,[facility] CEPHEID/CHESTER, NP SWAB IN TRANSPORT MEDIA 8-12 HR TAT   RAINBOW DRAW    Narrative:     The following orders were created for panel order [facility] Draw.  Procedure                               Abnormality         Status                     ---------                               -----------         ------                     Green Top (Gel)[[id number]]                                  Final result               Lavender Top[[id number]]                                     Final result               Gold Top - SST[[id number]]                                   Final result                Light Blue Top[[id number]]                                   Final result                 Please view results for these tests on the individual orders.   POCT GLUCOSE FINGERSTICK   CBC AND DIFFERENTIAL    Narrative:     The following orders were created for panel order CBC & Differential.  Procedure                               Abnormality         Status                     ---------                               -----------         ------                     CBC Auto Differential[[id number]]        Abnormal            Final result                 Please view results for these tests on the individual orders.   GREEN TOP   LAVENDER TOP   GOLD TOP - SST   LIGHT BLUE TOP       EKG:   ECG 12 Lead   Preliminary Result   HEART RATE= 85  bpm   RR Interval= 706  ms   WY Interval= 192  ms   P Horizontal Axis= 6  deg   P Front Axis= 54  deg   QRSD Interval= 82  ms   QT Interval= 355  ms   QRS Axis= -19  deg   T Wave Axis= 41  deg   - BORDERLINE ECG -   Sinus rhythm   Probable left atrial enlargement   Borderline left axis deviation   Abnormal R-wave progression, early transition   Artifact in lead(s) II,III,aVR,aVL,aVF,V1,V2,V5   Electronically Signed By:    Date and Time of Study: 2021-11-10 20:07:01          Meds given in ED:   Medications   sodium chloride 0.9 % flush 10 mL (has no administration in time range)       Imaging results:  No radiology results for the last day    Ambulatory status:   bedrest    Social issues:   Social History     Socioeconomic History   • Marital status:    Tobacco Use   • Smoking status: Never Smoker   • Smokeless tobacco: Never Used   Substance and Sexual Activity   • Alcohol use: Yes     Comment: 3 drinks weekly   • Drug use: No        Nursing report ED to floor:     Arjun Li RN  11/10/21 9460

## 2021-11-11 NOTE — PLAN OF CARE
Goal Outcome Evaluation:  Plan of Care Reviewed With: patient, other (see comments) (son-in law)        Progress: no change  Outcome Summary: Pt seen by OT this date for evaluation. Pt is a 77 y/o male admit to [facility] for metabolic encephalopathy, generalized weakness and AMS from outside facility. Pt's son-in law present for evaluation and states that pt is independent with ADLs at baseline and does not use any AD for functional mobility. Upon arrival pt lying supine in bed, 7/10 pain in low back, A&O x1. Pt very lethargic this date and unable to stay aroused even with sternal rubs pt would open eyes for a shrt time and then close them. Pt was able to complete grooming task in supine with Min A with noted BUEs tremors which creates difficulty with completing task. Pt resistant when assessing BUEs this date and unable to follow commands. Pt left in supine, needs in reach, alarm on. Pt to benefit from skilled OT services to address endurance, BUE strength, functional mobility, and ADL task completion. OT rec D/C back to facility when medically appropriate. OT wore mask, gloves, glasses, hand hygiene performed.

## 2021-11-11 NOTE — PLAN OF CARE
Goal Outcome Evaluation:              Outcome Summary: Clinical swallow eval completed. Pt seen initially in am, lethargic. Pt opened eyes to stimuli, however, was unable to maintain alert status. Pt had coughing (productive) after ice chips. Eval terminated due to alert status. Eval continued in pm with pt still needing consistent cues to stay awake. Pt tolerated small ice chips. Pt had wet vocal quality with teaspoon trials of thin and NTL in 1/2 trials. Pt tolerated bites of pureed. Multiple swallows (2-3) noted across trials. Laryngeal elevation sluggish with palpation. Pt is not safe for PO diet due to lethargy at this time. Pt may have essential meds crushed in pureed when he is responsive w/ max cues. Continue with oral care BID. ST to follow up next date for reeval.

## 2021-11-11 NOTE — ED PROVIDER NOTES
EMERGENCY DEPARTMENT ENCOUNTER    Room Number:  P590/1  Date seen:  11/11/2021  PCP: Naeem Garcia MD  Historian: Patient, daughter      HPI:  Chief Complaint: Altered mental status, weakness  A complete HPI/ROS/PMH/PSH/SH/FH are unobtainable due to: Mental status change, dementia  Context: Rogelio Sher is a 76 y.o. male who presents to the ED due to reported generalized weakness over the last 5 days.  His daughter actually reports that symptoms were more acute onset today.  She reports that he does have Parkinson's disease and sometimes he does have bad days where he will get stiff and have decreased mobility.  She reports that just over the weekend he was walking up and down stairs independently at her home, so his inability to walk today is an acute decline in his functional status.  He has had no reported fever or chills.  He has had some falls recently but no reported head trauma.  She believes that he has been compliant with his medications including his Sinemet.    Further history is limited at this time due to patient's mental status change and dementia.        PAST MEDICAL HISTORY  Active Ambulatory Problems     Diagnosis Date Noted   • Generalized weakness 02/01/2018   • Parkinson's disease (HCC) 02/02/2018   • Essential hypertension 02/02/2018   • Influenza A 02/02/2018     Resolved Ambulatory Problems     Diagnosis Date Noted   • No Resolved Ambulatory Problems     Past Medical History:   Diagnosis Date   • Chorioretinitis    • GERD (gastroesophageal reflux disease)    • Hyperlipidemia    • Hypertension    • Kidney stone          PAST SURGICAL HISTORY  Past Surgical History:   Procedure Laterality Date   • CATARACT EXTRACTION, BILATERAL     • COLONOSCOPY     • KIDNEY STONE SURGERY     • TONSILLECTOMY           FAMILY HISTORY  No family history on file.      SOCIAL HISTORY  Social History     Socioeconomic History   • Marital status:    Tobacco Use   • Smoking status: Never Smoker   •  Smokeless tobacco: Never Used   Substance and Sexual Activity   • Alcohol use: Yes     Comment: 3 drinks weekly   • Drug use: No         ALLERGIES  Patient has no known allergies.        REVIEW OF SYSTEMS  Review of Systems   Review of systems limited due to mental status change and dementia.      PHYSICAL EXAM  ED Triage Vitals [11/10/21 1622]   Temp Heart Rate Resp BP SpO2   97.8 °F (36.6 °C) 82 18 130/73 97 %      Temp src Heart Rate Source Patient Position BP Location FiO2 (%)   Oral Monitor Sitting Left arm --         GENERAL: Awake and alert, no acute distress  HENT: nares patent, no scalp hematoma  EYES: no scleral icterus, pupils 3 mm reactive bilaterally, EOMI  CV: regular rhythm, normal rate  RESPIRATORY: normal effort, lungs clear bilaterally  ABDOMEN: soft,, nontender throughout  MUSCULOSKELETAL: no deformity  NEURO: alert, moves all extremities, cranial nerves II through XII grossly intact without apparent facial droop.  His speech is mostly unintelligible.  He has occasional periods of lucency with articulate and intelligible speech  PSYCH:  calm, cooperative  SKIN: warm, dry    Vital signs and nursing notes reviewed.          LAB RESULTS  Recent Results (from the past 24 hour(s))   Comprehensive Metabolic Panel    Collection Time: 11/10/21  7:41 PM    Specimen: Blood   Result Value Ref Range    Glucose 97 65 - 99 mg/dL    BUN 21 8 - 23 mg/dL    Creatinine 1.10 0.76 - 1.27 mg/dL    Sodium 145 136 - 145 mmol/L    Potassium 3.8 3.5 - 5.2 mmol/L    Chloride 106 98 - 107 mmol/L    CO2 24.7 22.0 - 29.0 mmol/L    Calcium 9.0 8.6 - 10.5 mg/dL    Total Protein 7.0 6.0 - 8.5 g/dL    Albumin 4.60 3.50 - 5.20 g/dL    ALT (SGPT) 10 1 - 41 U/L    AST (SGOT) 69 (H) 1 - 40 U/L    Alkaline Phosphatase 99 39 - 117 U/L    Total Bilirubin 0.8 0.0 - 1.2 mg/dL    eGFR Non African Amer 65 >60 mL/min/1.73    Globulin 2.4 gm/dL    A/G Ratio 1.9 g/dL    BUN/Creatinine Ratio 19.1 7.0 - 25.0    Anion Gap 14.3 5.0 - 15.0 mmol/L    Troponin    Collection Time: 11/10/21  7:41 PM    Specimen: Blood   Result Value Ref Range    Troponin T <0.010 0.000 - 0.030 ng/mL   Magnesium    Collection Time: 11/10/21  7:41 PM    Specimen: Blood   Result Value Ref Range    Magnesium 2.2 1.6 - 2.4 mg/dL   Urinalysis With Microscopic If Indicated (No Culture) - Urine, Catheter    Collection Time: 11/10/21  7:41 PM    Specimen: Urine, Catheter   Result Value Ref Range    Color, UA Yellow Yellow, Straw    Appearance, UA Clear Clear    pH, UA 6.5 5.0 - 8.0    Specific Gravity, UA 1.022 1.005 - 1.030    Glucose, UA Negative Negative    Ketones, UA Trace (A) Negative    Bilirubin, UA Negative Negative    Blood, UA Small (1+) (A) Negative    Protein, UA Trace (A) Negative    Leuk Esterase, UA Negative Negative    Nitrite, UA Negative Negative    Urobilinogen, UA 1.0 E.U./dL 0.2 - 1.0 E.U./dL   Protime-INR    Collection Time: 11/10/21  7:41 PM    Specimen: Blood   Result Value Ref Range    Protime 13.8 11.7 - 14.2 Seconds    INR 1.08 0.90 - 1.10   Green Top (Gel)    Collection Time: 11/10/21  7:41 PM   Result Value Ref Range    Extra Tube Hold for add-ons.    Lavender Top    Collection Time: 11/10/21  7:41 PM   Result Value Ref Range    Extra Tube hold for add-on    Gold Top - SST    Collection Time: 11/10/21  7:41 PM   Result Value Ref Range    Extra Tube Hold for add-ons.    Light Blue Top    Collection Time: 11/10/21  7:41 PM   Result Value Ref Range    Extra Tube hold for add-on    CBC Auto Differential    Collection Time: 11/10/21  7:41 PM    Specimen: Blood   Result Value Ref Range    WBC 11.17 (H) 3.40 - 10.80 10*3/mm3    RBC 4.50 4.14 - 5.80 10*6/mm3    Hemoglobin 14.8 13.0 - 17.7 g/dL    Hematocrit 43.8 37.5 - 51.0 %    MCV 97.3 (H) 79.0 - 97.0 fL    MCH 32.9 26.6 - 33.0 pg    MCHC 33.8 31.5 - 35.7 g/dL    RDW 12.5 12.3 - 15.4 %    RDW-SD 44.6 37.0 - 54.0 fl    MPV 8.7 6.0 - 12.0 fL    Platelets 163 140 - 450 10*3/mm3    Neutrophil % 71.1 42.7 - 76.0 %     Lymphocyte % 18.8 (L) 19.6 - 45.3 %    Monocyte % 8.5 5.0 - 12.0 %    Eosinophil % 0.8 0.3 - 6.2 %    Basophil % 0.3 0.0 - 1.5 %    Immature Grans % 0.5 0.0 - 0.5 %    Neutrophils, Absolute 7.94 (H) 1.70 - 7.00 10*3/mm3    Lymphocytes, Absolute 2.10 0.70 - 3.10 10*3/mm3    Monocytes, Absolute 0.95 (H) 0.10 - 0.90 10*3/mm3    Eosinophils, Absolute 0.09 0.00 - 0.40 10*3/mm3    Basophils, Absolute 0.03 0.00 - 0.20 10*3/mm3    Immature Grans, Absolute 0.06 (H) 0.00 - 0.05 10*3/mm3    nRBC 0.0 0.0 - 0.2 /100 WBC   Urinalysis, Microscopic Only - Urine, Catheter    Collection Time: 11/10/21  7:41 PM    Specimen: Urine, Catheter   Result Value Ref Range    RBC, UA 31-50 (A) None Seen, 0-2 /HPF    WBC, UA 0-2 None Seen, 0-2 /HPF    Bacteria, UA None Seen None Seen /HPF    Squamous Epithelial Cells, UA 0-2 None Seen, 0-2 /HPF    Hyaline Casts, UA 0-2 None Seen /LPF    Methodology Automated Microscopy    COVID-19, [facility] CEPHEID/CHESTER NP SWAB IN TRANSPORT MEDIA 8-12 HR TAT - Swab, Nasopharynx    Collection Time: 11/10/21  8:00 PM    Specimen: Nasopharynx; Swab   Result Value Ref Range    COVID19 Not Detected Not Detected - Ref. Range   ECG 12 Lead    Collection Time: 11/10/21  8:07 PM   Result Value Ref Range    QT Interval 355 ms   POC Glucose Once    Collection Time: 11/10/21  9:10 PM    Specimen: Blood   Result Value Ref Range    Glucose 102 70 - 130 mg/dL       Ordered the above labs and reviewed the results.        RADIOLOGY  CT Head Without Contrast    Result Date: 11/10/2021  CT SCAN OF THE BRAIN WITHOUT CONTRAST  HISTORY: Generalized weakness. Dementia. Confusion.  The CT scan was performed as an emergency procedure through the brain without contrast. There is moderate diffuse atrophy and chronic small vessel ischemic change. There is no evidence of acute intracranial hemorrhage or mass effect. There is some polypoid mucosal thickening or mucous retention cyst in the right maxillary sinus.      Radiation dose  reduction techniques were utilized, including automated exposure control and exposure modulation based on body size.  This report was finalized on 11/10/2021 8:46 PM by Dr. Jamal Ang M.D.      XR Chest 1 View    Result Date: 11/10/2021  ONE VIEW PORTABLE CHEST AT 4:52 PM  HISTORY: Weakness and dizziness. Kyphosis. Hypertension.  FINDINGS: There is poor lung expansion with some vague vascular crowding and possible mild vascular congestion compared to the study of 02/01/2018. The heart is enlarged without change.  This report was finalized on 11/10/2021 5:56 PM by Dr. Jamal Ang M.D.        Ordered the above noted radiological studies. Reviewed by me in PACS.            PROCEDURES  Procedures              MEDICATIONS GIVEN IN ER  Medications   sodium chloride 0.9 % flush 10 mL (has no administration in time range)   sodium chloride 0.9 % flush 10 mL (10 mL Intravenous Given 11/11/21 0053)   sodium chloride 0.9 % flush 10 mL (has no administration in time range)   nitroglycerin (NITROSTAT) SL tablet 0.4 mg (has no administration in time range)   sodium chloride 0.9 % infusion (100 mL/hr Intravenous New Bag 11/11/21 0052)   acetaminophen (TYLENOL) tablet 650 mg (has no administration in time range)     Or   acetaminophen (TYLENOL) 160 MG/5ML solution 650 mg (has no administration in time range)     Or   acetaminophen (TYLENOL) suppository 650 mg (has no administration in time range)   ondansetron (ZOFRAN) injection 4 mg (has no administration in time range)                   MEDICAL DECISION MAKING, PROGRESS, and CONSULTS    All labs have been independently reviewed by me.  All radiology studies have been reviewed by me and discussed with radiologist dictating the report.   EKG's independently viewed and interpreted by me.  Discussion below represents my analysis of pertinent findings related to patient's condition, differential diagnosis, treatment plan and final disposition.      Differential diagnosis  includes but is not limited to:  Metabolic or toxic encephalopathy  Infectious encephalopathy  Acute UTI  Pneumonia  Intracranial hemorrhage  NPH  Hypoglycemia      ED Course as of 11/11/21 0104   Wed Nov 10, 2021   1955 Chest x-ray independently interpreted in PACS.  Patient is rotated and has low lung volumes causing some mild vascular crowding and enlargement of the cardiac silhouette.  There is no definite infiltrate or pleural effusion seen. [JR]   1956 Patient reassessed.  His daughter is now at bedside.  She reports that he does have a history of Parkinson's dementia but he is typically able to ambulate and has had an acute decline in his functional status today.  He has reportedly had some recent falls without head trauma reported.  Patient has his own supply of Sinemet and he will take his evening dose at this time. [JR]   2042 Patient reassessed.  He is more alert and little more coherent.  His daughter remains at the bedside.  I explained his CT head appears negative for acute intracranial hemorrhage or other acute abnormality and the labs are generally reassuring.  Nonetheless, patient is not appropriate for discharge back to assisted living facility at this time based on his persistent altered mental status and ambulatory dysfunction.  He'll be admitted for further evaluation of symptoms. [JR]   2044 EKG          EKG time: 2007  Rhythm/Rate: Sinus rhythm, 85  P waves and AL: Normal  QRS, axis: Borderline left axis  ST and T waves: No acute ischemic changes    Interpreted Contemporaneously by me, independently viewed         [JR]   2048 Discussed with ELIAZAR Slater for [facility], who agrees to admit on behalf of Dr. Peña. [JR]      ED Course User Index  [JR] Leonard Martel MD              I wore an N95 mask, face shield, and gloves during this patient encounter.  Patient also wearing a surgical mask.  Hand hygeine performed before and after seeing the patient.    DIAGNOSIS  Final diagnoses:    Altered mental status, unspecified altered mental status type   Parkinson's disease (HCC)   Hematuria, unspecified type   Ambulatory dysfunction         DISPOSITION  ADMIT            Latest Documented Vital Signs:  As of 01:04 EST  BP- 147/85 HR- 82 Temp- 97.3 °F (36.3 °C) (Oral) O2 sat- 97%        --    Please note that portions of this were completed with a voice recognition program.            Leonard Martel MD  11/11/21 0109

## 2021-11-11 NOTE — ED NOTES
Pt appears to be lightly combative and confused. Only oriented to himself. Words are hard to understand and babbled. Pt had 2 small skin tears on right knee and small skin tear on left knee. Per daughter it was from a previous fall. Per daughter PT fell out of bed at his facility yesterday, denies hitting his head and daughter reports no blood thinners. Daughter reports PT can normally hold a conversation one can understand and walk around up and down stairs before today. Pt has hx of parkinson's.       Arjun Li, BONNY  11/10/21 2039

## 2021-11-12 LAB
GLUCOSE BLDC GLUCOMTR-MCNC: 81 MG/DL (ref 70–130)
GLUCOSE BLDC GLUCOMTR-MCNC: 86 MG/DL (ref 70–130)
GLUCOSE BLDC GLUCOMTR-MCNC: 89 MG/DL (ref 70–130)

## 2021-11-12 PROCEDURE — 97530 THERAPEUTIC ACTIVITIES: CPT

## 2021-11-12 PROCEDURE — 82962 GLUCOSE BLOOD TEST: CPT

## 2021-11-12 PROCEDURE — 92526 ORAL FUNCTION THERAPY: CPT

## 2021-11-12 PROCEDURE — 99233 SBSQ HOSP IP/OBS HIGH 50: CPT | Performed by: NURSE PRACTITIONER

## 2021-11-12 RX ORDER — DIPHENHYDRAMINE HYDROCHLORIDE 50 MG/ML
25 INJECTION INTRAMUSCULAR; INTRAVENOUS
Status: ACTIVE | OUTPATIENT
Start: 2021-11-13 | End: 2021-11-13

## 2021-11-12 RX ADMIN — CARBIDOPA AND LEVODOPA 1 TABLET: 25; 100 TABLET ORAL at 00:13

## 2021-11-12 RX ADMIN — MEMANTINE HYDROCHLORIDE 10 MG: 10 TABLET, FILM COATED ORAL at 09:30

## 2021-11-12 RX ADMIN — MEMANTINE HYDROCHLORIDE 10 MG: 10 TABLET, FILM COATED ORAL at 22:38

## 2021-11-12 RX ADMIN — PRAMIPEXOLE DIHYDROCHLORIDE 0.75 MG: 0.5 TABLET ORAL at 17:03

## 2021-11-12 RX ADMIN — PANTOPRAZOLE SODIUM 40 MG: 40 TABLET, DELAYED RELEASE ORAL at 06:32

## 2021-11-12 RX ADMIN — CARBIDOPA AND LEVODOPA 1 TABLET: 25; 100 TABLET ORAL at 12:44

## 2021-11-12 RX ADMIN — RIVASTIGMINE TARTRATE 6 MG: 1.5 CAPSULE ORAL at 17:03

## 2021-11-12 RX ADMIN — RIVASTIGMINE TARTRATE 6 MG: 1.5 CAPSULE ORAL at 09:30

## 2021-11-12 RX ADMIN — SODIUM CHLORIDE, PRESERVATIVE FREE 10 ML: 5 INJECTION INTRAVENOUS at 09:31

## 2021-11-12 RX ADMIN — CARBIDOPA AND LEVODOPA 1 TABLET: 25; 100 TABLET ORAL at 06:28

## 2021-11-12 RX ADMIN — CARBIDOPA AND LEVODOPA 1 TABLET: 25; 100 TABLET ORAL at 17:03

## 2021-11-12 RX ADMIN — PRAMIPEXOLE DIHYDROCHLORIDE 0.75 MG: 0.5 TABLET ORAL at 09:31

## 2021-11-12 RX ADMIN — ASPIRIN 81 MG: 81 TABLET, COATED ORAL at 09:31

## 2021-11-12 RX ADMIN — PRAMIPEXOLE DIHYDROCHLORIDE 0.75 MG: 0.5 TABLET ORAL at 22:37

## 2021-11-12 RX ADMIN — SODIUM CHLORIDE 100 ML/HR: 9 INJECTION, SOLUTION INTRAVENOUS at 07:31

## 2021-11-12 RX ADMIN — SODIUM CHLORIDE 100 ML/HR: 9 INJECTION, SOLUTION INTRAVENOUS at 17:09

## 2021-11-12 NOTE — THERAPY TREATMENT NOTE
Patient Name: Rogelio Sher  : 1945    MRN: [id number]                              Today's Date: 2021       Admit Date: 11/10/2021    Visit Dx:     ICD-10-CM ICD-9-CM   1. Altered mental status, unspecified altered mental status type  R41.82 780.97   2. Parkinson's disease (HCC)  G20 332.0   3. Hematuria, unspecified type  R31.9 599.70   4. Ambulatory dysfunction  R26.2 719.7     Patient Active Problem List   Diagnosis   • Generalized weakness   • Parkinson's disease (HCC)   • Essential hypertension   • Influenza A   • Altered mental status   • Metabolic encephalopathy     Past Medical History:   Diagnosis Date   • Chorioretinitis     histoplasmosis left eye   • GERD (gastroesophageal reflux disease)    • Hyperlipidemia    • Hypertension    • Kidney stone    • Parkinson's disease (HCC)      Past Surgical History:   Procedure Laterality Date   • CATARACT EXTRACTION, BILATERAL     • COLONOSCOPY     • KIDNEY STONE SURGERY     • TONSILLECTOMY        General Information     Row Name 21 1540          Physical Therapy Time and Intention    Document Type therapy note (daily note)  -MW     Mode of Treatment individual therapy; physical therapy  -     Row Name 21 1540          General Information    Patient Profile Reviewed yes  -MW     Existing Precautions/Restrictions fall  -     Row Name 21 1540          Cognition    Orientation Status (Cognition) oriented to; person  -     Row Name 21 1540          Safety Issues, Functional Mobility    Impairments Affecting Function (Mobility) cognition; endurance/activity tolerance; strength; range of motion (ROM); motor control; motor planning; coordination; postural/trunk control  -     Comment, Safety Issues/Impairments (Mobility) Gait belt and nonslip socks used for safety  -MW           User Key  (r) = Recorded By, (t) = Taken By, (c) = Cosigned By    Initials Name Provider Type    MW Yahaira Dugan, PT Physical Therapist                Mobility     [facility] Name 11/12/21 1541          Bed Mobility    Bed Mobility supine-sit; sit-supine  -     Supine-Sit [place] (Bed Mobility) moderate assist (50% patient effort); verbal cues; nonverbal cues (demo/gesture)  -     Sit-Supine [place] (Bed Mobility) verbal cues; minimum assist (75% patient effort); moderate assist (50% patient effort)  -     Assistive Device (Bed Mobility) bed rails; head of bed elevated  [facility] Name 11/12/21 1541          Transfers    Comment (Transfers) Frequent verbal and visual cues for stand to sit  -[facility] Name 11/12/21 1541          Sit-Stand Transfer    Sit-Stand [place] (Transfers) minimum assist (75% patient effort); moderate assist (50% patient effort); verbal cues; nonverbal cues (demo/gesture); 2 person assist  -     Assistive Device (Sit-Stand Transfers) other (see comments)  HHA x 2  -MW     [facility] Name 11/12/21 1541          Gait/Stairs (Locomotion)    [place] Level (Gait) minimum assist (75% patient effort); moderate assist (50% patient effort); 2 person assist; verbal cues; nonverbal cues (demo/gesture)  -     Assistive Device (Gait) other (see comments)  HHA x 2  -     Deviations/Abnormal Patterns (Gait) bilateral deviations; festinating/shuffling; stride length decreased  -     Bilateral Gait Deviations forward flexed posture  -     Comment (Gait/Stairs) Visual cues given for ambulation and redirection to task.  -           User Key  (r) = Recorded By, (t) = Taken By, (c) = Cosigned By    Initials Name Provider Type    Yahaira Campos PT Physical Therapist               Obj/Interventions    No documentation.                Goals/Plan    No documentation.                Clinical Impression     [facility] Name 11/12/21 1544          Pain    Additional Documentation Pain Scale: Numbers Pre/Post-Treatment (Group)  -MW     Row Name 11/12/21 1544          Pain Scale: Numbers Pre/Post-Treatment    Pretreatment Pain Rating 0/10 - no  pain  -MW     Posttreatment Pain Rating 0/10 - no pain  -MW     Row Name 11/12/21 0747          Plan of Care Review    Plan of Care Reviewed With patient; daughter  -MW     Progress improving  -MW     Outcome Summary Patient seen for PT tx, initially did not want to participate, states that he had exercised for 45 min. He agreed to sit at EOB then was more willing to participate as his daughter also arrived. He appears more alert today, occasionally mumbling after speaking a few words. He was able to ambulate with verbal cues to walk towards the window where his daughter was standing, x 20 feet with Min/Mod A and HHA x 2. He performed better with less assistance given until needed; follows simple commands. Cont with PT and progress as tolerated.  -MW     Row Name 11/12/21 1546          Vital Signs    O2 Delivery Pre Treatment room air  -MW     O2 Delivery Intra Treatment room air  -MW     O2 Delivery Post Treatment room air  -MW     Pre Patient Position Supine  -MW     Intra Patient Position Standing  -MW     Post Patient Position Supine  -MW     Row Name 11/12/21 1541          Positioning and Restraints    Pre-Treatment Position in bed  -MW     Post Treatment Position bed  -MW     In Bed notified nsg; supine; call light within reach; encouraged to call for assist; exit alarm on; with family/caregiver  nsg notified that he was needing his brief changed  -MW           User Key  (r) = Recorded By, (t) = Taken By, (c) = Cosigned By    Initials Name Provider Type    Yahaira Campos, PT Physical Therapist               Outcome Measures     Row Name 11/12/21 1448          How much help from another person do you currently need...    Turning from your back to your side while in flat bed without using bedrails? 3  -MW     Moving from lying on back to sitting on the side of a flat bed without bedrails? 3  -MW     Moving to and from a bed to a chair (including a wheelchair)? 3  -MW     Standing up from a chair using your  arms (e.g., wheelchair, bedside chair)? 3  -MW     Climbing 3-5 steps with a railing? 2  -MW     To walk in hospital room? 2  -MW     AM-PAC 6 Clicks Score (PT) 16  -     Row Name 11/12/21 1550          Modified Erik Scale    Modified Erik Scale 4 - Moderately severe disability.  Unable to walk without assistance, and unable to attend to own bodily needs without assistance.  -     Row Name 11/12/21 1550          Functional Assessment    Outcome Measure Options AM-PAC 6 Clicks Basic Mobility (PT)  -           User Key  (r) = Recorded By, (t) = Taken By, (c) = Cosigned By    Initials Name Provider Type     Yahaira Dugan PT Physical Therapist                             Physical Therapy Education                 Title: PT OT SLP Therapies (In Progress)     Topic: Physical Therapy (In Progress)     Point: Mobility training (Done)     Learning Progress Summary           Patient Acceptance, E, VU,NR by  at 11/12/2021 1551   Family Acceptance, E, VU,NR by  at 11/12/2021 1551                   Point: Home exercise program (Not Started)     Learner Progress:  Not documented in this visit.          Point: Body mechanics (Done)     Learning Progress Summary           Patient Acceptance, E, VU,NR by  at 11/12/2021 1551   Family Acceptance, E, VU,NR by  at 11/12/2021 1551                   Point: Precautions (Done)     Learning Progress Summary           Patient Acceptance, E, VU,NR by  at 11/12/2021 1551   Family Acceptance, E, VU,NR by  at 11/12/2021 1551                               User Key     Initials Effective Dates Name Provider Type [facility] 06/16/21 -  Yahaira Dugan PT Physical Therapist PT              PT Recommendation and Plan     Plan of Care Reviewed With: patient, daughter  Progress: improving  Outcome Summary: Patient seen for PT tx, initially did not want to participate, states that he had exercised for 45 min. He agreed to sit at EOB then was more willing to participate as  his daughter also arrived. He appears more alert today, occasionally mumbling after speaking a few words. He was able to ambulate with verbal cues to walk towards the window where his daughter was standing, x 20 feet with Min/Mod A and HHA x 2. He performed better with less assistance given until needed; follows simple commands. Cont with PT and progress as tolerated.     Time Calculation:    PT Charges     Row Name 11/12/21 1553             Time Calculation    Start Time 1550  -MW      Stop Time 1603  -MW      Time Calculation (min) 13 min  -MW      PT Received On 11/12/21  -MW      PT - Next Appointment 11/13/21  -MW              Time Calculation- PT    Total Timed Code Minutes- PT 12 minute(s)  -MW            User Key  (r) = Recorded By, (t) = Taken By, (c) = Cosigned By    Initials Name Provider Type    Yahaira Campos PT Physical Therapist              Therapy Charges for Today     Code Description Service Date Service Provider Modifiers Qty    58740530097  PT THER SUPP EA 15 MIN 11/12/2021 Yahaira Dugan, PT GP 1    [id number]  PT THERAPEUTIC ACT EA 15 MIN 11/12/2021 Yahaira Dugan, PT GP 1          PT G-Codes  Outcome Measure Options: AM-PAC 6 Clicks Basic Mobility (PT)  AM-PAC 6 Clicks Score (PT): 16  AM-PAC 6 Clicks Score (OT): 14  Modified [place] Scale: 4 - Moderately severe disability.  Unable to walk without assistance, and unable to attend to own bodily needs without assistance.    Yahaira Dugan PT  11/12/2021

## 2021-11-12 NOTE — PROGRESS NOTES
DOS: 2021  NAME: Rogelio Sher   : 1945  PCP: Naeem Garcia MD  Chief Complaint   Patient presents with   • Weakness - Generalized     Pt from [place]  generalized weakness over the past 5 days.     Subjective: No events overnight.  Nursing reports patient appears more alert/attentive.  While working with therapy earlier today patient used his nondominant hand to grab 4 drinks and appeared somewhat weaker in the right hand according to therapist.  On repeat nursing evaluation patient had symmetrical movement and strength; on my exam he also had symmetrical movement and strength.    No family at bedside    Objective:  Vital signs: /84 (BP Location: Left arm, Patient Position: Lying)   Pulse 67   Temp 98.7 °F (37.1 °C) (Oral)   Resp 16   SpO2 97%       HEENT: Normocephalic, atraumatic   COR: RRR  Resp: Even and unlabored  Extremities: Equal pulses, nondistal embolization    Neurological:   MS: AO.  Flat affect.  Language normal. No neglect. Higher integrative function normal  CN: II-XII normal  Motor: 5/5, increased rigidity/cogwheeling bilateral upper and lower extremities; left greater than right  Reflexes: Toes downgoing  Sensory: Withdraws all 4 extremities to stimuli  Coordination: Limited due to rigidity    Laboratory results:  Lab Results   Component Value Date    GLUCOSE 97 11/10/2021    CALCIUM 9.0 11/10/2021     11/10/2021    K 3.8 11/10/2021    CO2 24.7 11/10/2021     11/10/2021    BUN 21 11/10/2021    CREATININE 1.10 11/10/2021    EGFRIFNONA 65 11/10/2021    BCR 19.1 11/10/2021    ANIONGAP 14.3 11/10/2021     Lab Results   Component Value Date    WBC 11.17 (H) 11/10/2021    HGB 14.8 11/10/2021    HCT 43.8 11/10/2021    MCV 97.3 (H) 11/10/2021     11/10/2021          Review and interpretation of imaging:  ONE VIEW PORTABLE CHEST AT 4:52 PM     HISTORY: Weakness and dizziness. Kyphosis. Hypertension.     FINDINGS: There is poor lung expansion with some  vague vascular crowding  and possible mild vascular congestion compared to the study of  02/01/2018. The heart is enlarged without change.     This report was finalized on 11/10/2021 5:56 PM by Dr. Jamal Ang M.D.     CT SCAN OF THE BRAIN WITHOUT CONTRAST     HISTORY: Generalized weakness. Dementia. Confusion.     The CT scan was performed as an emergency procedure through the brain  without contrast. There is moderate diffuse atrophy and chronic small  vessel ischemic change. There is no evidence of acute intracranial  hemorrhage or mass effect. There is some polypoid mucosal thickening or  mucous retention cyst in the right maxillary sinus.                 Radiation dose reduction techniques were utilized, including automated  exposure control and exposure modulation based on body size.     This report was finalized on 11/10/2021 8:46 PM by Dr. Jamal Ang M.D.         Impression:  1.  Parkinson's disease; worsening  2.  Leukocytosis  3.  At risk for obstructive sleep apnea: Moderate  4.  Questionable right upper extremity weakness-once resolved: We will obtain MRI to further evaluate      Neurologically, improving more interactive appropriate on my exam.  Tolerating increased dose of carbidopa levodopa; swallowing pills without any difficulty according to nursing.  Given new questionable right upper extremity weakness we will plan to complete MRI to further evaluate.  Other recommendations below.PT/OT/ST. CCP to assist with discharge planning. Call RRT for any acute neurological changes and/or concerns. We will continue to follow and advise.         Plan:  · Carbidopa levodopa 5/100 mg 4 times daily: Increased this admission  · Mirapex 0.75 mg 3 times daily  · Namenda 10 mg twice daily  · Exelon 6 mg twice daily  · MRI of the brain due to questionable right upper extremity weakness  · Recommend outpatient follow-up with movement disorder neurologist at [facility] Dr. Robison or   Don[place]      Reviewed with attending neurologist Dr.Khaleefah Karla Chávez, APRN

## 2021-11-12 NOTE — THERAPY TREATMENT NOTE
Acute Care - Speech Language Pathology Treatment Note    [facility]     Patient Name: Rogelio Sher  : 1945  MRN: [id number]    Today's Date: 2021                   Admit Date: 11/10/2021       Visit Dx:      ICD-10-CM ICD-9-CM   1. Altered mental status, unspecified altered mental status type  R41.82 780.97   2. Parkinson's disease (HCC)  G20 332.0   3. Hematuria, unspecified type  R31.9 599.70   4. Ambulatory dysfunction  R26.2 719.7       Patient Active Problem List   Diagnosis   • Generalized weakness   • Parkinson's disease (HCC)   • Essential hypertension   • Influenza A   • Altered mental status   • Metabolic encephalopathy       Past Medical History:   Diagnosis Date   • Chorioretinitis     histoplasmosis left eye   • GERD (gastroesophageal reflux disease)    • Hyperlipidemia    • Hypertension    • Kidney stone    • Parkinson's disease (HCC)        Past Surgical History:   Procedure Laterality Date   • CATARACT EXTRACTION, BILATERAL     • COLONOSCOPY     • KIDNEY STONE SURGERY     • TONSILLECTOMY         SLP Recommendation and Plan                                     Plan of Care Reviewed With: patient (21 1006)  Progress: improving (21 1006)           EDUCATION    The patient has been educated in the following areas:       Dysphagia (Swallowing Impairment).             SLP GOALS     Row Name 21 1100 21 1500          Oral Nutrition/Hydration Goal 1 (SLP)    Oral Nutrition/Hydration Goal 1, SLP Pt will safely tolerate the least restrictive PO diet with no s/s of aspiration.  -SH Pt will safely tolerate the least restrictive PO diet with no s/s of aspiration.  -AW     Time Frame (Oral Nutrition/Hydration Goal 1, SLP) by discharge  -SH by discharge  -AW     Barriers (Oral Nutrition/Hydration Goal 1, SLP) Progress: Patient seen for re evaluation of swallow function. The patient appears much improved this AM. Awake and agreeable to PO. Daughter present who reports no  baseline dysphagia. No overt s/s of aspiration across trials of ice, thins via cup/straw, puree, mixed mech soft, or regular. In fact, patient triggered an immediate swallow during mastication of mixed, which is a normal finding. Anterior loss of thins via cup on the R. SLP recs regular and thins, assist with meals, feed only when alert. Continue meds crushed or whole in puree d/t slight incoordination suspected with thins via straw. Of note, patient is right handed, but was choosing to use his L hand to present PO to self. When encouraged to use his R hand, patient appeared ataxic and dropped the cracker while bringing it to his oral cavity. This did not occur with his L hand. RN notified. Will follow for diet tolerance.  -SH --     Progress/Outcomes (Oral Nutrition/Hydration Goal 1, SLP) good progress toward goal  - --           User Key  (r) = Recorded By, (t) = Taken By, (c) = Cosigned By    Initials Name Provider Type    Nicolasa Goldberg, MS CCC-SLP Speech and Language Pathologist     Lisa Dominguez MS CCC-SLP Speech and Language Pathologist                SLP Outcome Measures (last 72 hours)     SLP Outcome Measures     Row Name 11/11/21 1500             SLP Outcome Measures    Outcome Measure Used? Adult NOMS  -AW              Adult FCM Scores    FCM Chosen Swallowing  -AW      Swallowing FCM Score 1  -            User Key  (r) = Recorded By, (t) = Taken By, (c) = Cosigned By    Initials Name Effective Dates    Nicolasa Goldberg, MS CCC-SLP 06/16/21 -                         Time Calculation:        Time Calculation- SLP     Row Name 11/12/21 1129             Time Calculation- SLP    SLP Start Time 1000  -SH      SLP Received On 11/12/21  -              Untimed Charges    40403-RE Treatment Swallow Minutes 60  -SH              Total Minutes    Untimed Charges Total Minutes 60  -SH       Total Minutes 60  -SH            User Key  (r) = Recorded By, (t) = Taken By, (c) = Cosigned By    Initials Name Provider  Type    SH Lisa Dominguez MS CCC-SLP Speech and Language Pathologist                  Therapy Charges for Today     Code Description Service Date Service Provider Modifiers Qty    [id number] HC ST TREATMENT SWALLOW 4 11/12/2021 Lisa Dominguez MS CCC-SLP GN 1            ADULT NOMS (last 72 hours)     Adult NOMS     Row Name 11/11/21 1500                   Adult FCM Scores    FCM Chosen Swallowing  -AW        Swallowing FCM Score 1  -AW              User Key  (r) = Recorded By, (t) = Taken By, (c) = Cosigned By    Initials Name Effective Dates    Nicolasa Goldberg MS CCC-SLP 06/16/21 -                            Lisa Dominguez MS CCC-SLP  11/12/2021

## 2021-11-12 NOTE — PLAN OF CARE
Goal Outcome Evaluation:  Plan of Care Reviewed With: patient        Progress: improving  Outcome Summary: Patient seen for re evaluation of swallow function. The patient appears much improved this AM. Awake and agreeable to PO. Daughter present who reports no baseline dysphagia. No overt s/s of aspiration across trials of ice, thins via cup/straw, puree, mixed mech soft, or regular. In fact, patient triggered an immediate swallow during mastication of mixed, which is a normal finding. Anterior loss of thins via cup on the R. SLP recs regular and thins, assist with meals, feed only when alert. Continue meds crushed or whole in puree d/t slight incoordination suspected with thins via straw. Of note, patient is right handed, but was choosing to use his L hand to present PO to self. When encouraged to use his R hand, patient appeared ataxic and dropped the cracker while bringing it to his oral cavity. This did not occur with his L hand. RN notified. Will follow for diet tolerance.     Patient was not wearing a face mask during this therapy encounter. Therapist used appropriate personal protective equipment including mask, eye protection and gloves.  Mask used was standard procedure mask. Appropriate PPE was worn during the entire therapy session. Hand hygiene was completed before and after therapy session. Patient is not in enhanced droplet precautions.

## 2021-11-12 NOTE — PLAN OF CARE
Problem: Fall Injury Risk  Goal: Absence of Fall and Fall-Related Injury  Outcome: Ongoing, Progressing  Intervention: Promote Injury-Free Environment  Recent Flowsheet Documentation  Taken 11/12/2021 0414 by Chel Fernandez RN  Safety Promotion/Fall Prevention: safety round/check completed  Taken 11/12/2021 0200 by Chel Fernandez RN  Safety Promotion/Fall Prevention: safety round/check completed  Taken 11/12/2021 0000 by Chel Fernandez RN  Safety Promotion/Fall Prevention: safety round/check completed  Taken 11/11/2021 2200 by Chel Fernandez RN  Safety Promotion/Fall Prevention: safety round/check completed  Taken 11/11/2021 2021 by Chel Fernandez RN  Safety Promotion/Fall Prevention: safety round/check completed   Goal Outcome Evaluation:

## 2021-11-12 NOTE — DISCHARGE PLACEMENT REQUEST
Froylan Diggs (76 y.o. Male)             Date of Birth Social Security Number Address Home Phone MRN    1945  [facility]  [address] [address] [phone] [id number]    Jewish Marital Status             Jewish        Admission Date Admission Type Admitting Provider Attending Provider Department, Room/Bed    11/10/21 Emergency Rasheed Peña MD Mas[place], Troy Andrew, MD [address], P590/1    Discharge Date Discharge Disposition Discharge Destination                         Attending Provider: Desmond Aburto MD    Allergies: No Known Allergies    Isolation: None   Infection: None   Code Status: CPR   Advance Care Planning Activity    Ht: --   Wt: --    Admission Cmt: None   Principal Problem: Metabolic encephalopathy [G93.41]                 Active Insurance as of 11/10/2021     Primary Coverage     Payor Plan Insurance Group Employer/Plan Group    MEDICARE MEDICARE A & B      Payor Plan Address Payor Plan Phone Number Payor Plan Fax Number Effective Dates    [address] [phone]  9/1/2009 - None Entered    [facility] [id number]       Subscriber Name Subscriber Birth Date Member ID       FROYLAN DIGGS 1945 [id number]           Secondary Coverage     Payor Plan Insurance Group Employer/Plan Group    AARP  SUP A[employer] HEALTH CARE OPTIONS      Payor Plan Address Payor Plan Phone Number Payor Plan Fax Number Effective Dates    [facility] [phone]  1/1/2016 - None Entered    [address]       [facility] [address]       Subscriber Name Subscriber Birth Date Member ID       FROYLAN DIGGS 1945 [id number]                 Emergency Contacts      (Rel.) Home Phone Work Phone Mobile Phone    Susan Ferreira (Daughter) [phone] -- [phone]    Shaheed Diggs (Son) [phone] -- [phone]

## 2021-11-12 NOTE — PROGRESS NOTES
Discharge Planning Assessment  [facility]     Patient Name: Rogelio Sher  MRN: [id number]  Today's Date: 11/12/2021    Admit Date: 11/10/2021     Discharge Needs Assessment     Row Name 11/12/21 1244       Living Environment    Lives With facility resident    Current Living Arrangements other (see comments)    Provides Primary Care For no one    Family Caregiver if Needed child(oksana), adult    Family Caregiver Names Daughter, Susan Ferreira, [phone]    Quality of Family Relationships helpful; involved; supportive    Able to Return to Prior Arrangements yes       Resource/Environmental Concerns    Resource/Environmental Concerns none       Transition Planning    Patient/Family Anticipates Transition to inpatient rehabilitation facility    Patient/Family Anticipated Services at Transition skilled nursing; rehabilitation services    Transportation Anticipated health plan transportation; agency       Discharge Needs Assessment    Equipment Currently Used at Home none    Concerns to be Addressed discharge planning    Outpatient/Agency/Support Group Needs skilled nursing facility    Discharge Facility/Level of Care Needs nursing facility, skilled    Provided Post Acute Provider List? Yes    Post Acute Provider List Nursing Home    Discharge Coordination/Progress SNF referrals pending               Discharge Plan     Row Name 11/12/21 7238       Plan    Plan SNF referrals pending    Patient/Family in Agreement with Plan yes    Plan Comments CCP spoke with pt's daughter (Susan Ferreira, [phone]) at bedside to verify information and discuss d/c planning. Pt resides in [facility] ([place], ph: [phone]) and was walking independently prior to admission and was independent with ADLs at baseline. Pt receives on site therapy at facility via [facility]. Per Farheen RN, pt would have to be independent with only assist of one to return to facility and would otherwise require rehab. Pt's daughter  agreeable to rehab referrals to SNF with higher CMS ratings. Referrals pending, CCP to follow for evteagan. Carolyn Berry LCSW              Continued Care and Services - Admitted Since 11/10/2021     Destination     Service Provider Request Status Selected Services Address Phone Fax Patient Preferred    [facility]  Pending - Request Sent N/A [address] [facility] [phone] [phone] --    [facility]  Pending - Request Sent N/A [address] [facility] [address] [phone] --    [facility]  [facility] - [facility] N/A [address] [facility] [id number] [phone] --    [facility]  Pending - [facility] Sent N/A [address] [facility] [address] [facility] [phone] --    [facility]  P[facility] - Request Sent N/A [address] [facility] [phone] --    [facility]  Pending - Request Sent N/A [address] [facility] [address] [phone] --    [facility]  Pending - Request Sent N/A [address] [facility] [id number] [phone] --              Expected Discharge Date and Time     Expected Discharge Date Expected Discharge Time    Nov 17, 2021          Demographic Summary     Row Name 11/12/21 1548       General Information    Admission Type inpatient    Arrived From long-term care    Required Notices Provided Important Message from Medicare    Referral Source admission list    Reason for Consult discharge planning    Preferred Language English               Functional Status     Row Name 11/12/21 1548       Functional Status    Usual Activity Tolerance good    Current Activity Tolerance moderate       Functional Status, IADL    Medications assistive person    Meal Preparation assistive person    Housekeeping assistive person    Laundry assistive person     Shopping assistive person       Mental Status Summary    Recent Changes in Mental Status/Cognitive Functioning unable to assess               Psychosocial    No documentation.                Abuse/Neglect    No documentation.                Legal    No documentation.                Substance Abuse    No documentation.                Patient Forms    No documentation.                   Trinity Berry LCSW

## 2021-11-12 NOTE — PLAN OF CARE
Goal Outcome Evaluation:  Plan of Care Reviewed With: patient, daughter        Progress: improving  Outcome Summary: Patient seen for PT tx, initially did not want to participate, states that he had exercised for 45 min. He agreed to sit at EOB then was more willing to participate as his daughter also arrived. He appears more alert today, occasionally mumbling after speaking a few words. He was able to ambulate with verbal cues to walk towards the window where his daughter was standing, x 20 feet with Min/Mod A and HHA x 2. He performed better with less assistance given until needed; follows simple commands. Cont with PT and progress as tolerated.  ..Patient was not wearing a face mask during this therapy encounter. Therapist used appropriate personal protective equipment including eye protection, mask, and gloves.  Mask used was standard procedure mask. Appropriate PPE was worn during the entire therapy session. Hand hygiene was completed before and after therapy session. Patient is not in enhanced droplet precautions.

## 2021-11-12 NOTE — PROGRESS NOTES
DAILY PROGRESS NOTE  [facility]    Patient Identification:  Name: Rogelio Sher  Age: 76 y.o.  Sex: male  :  1945  MRN: [id number]         Primary Care Physician: Naeem Garcia MD    Subjective:  Interval History: Not the best of historians this morning but definitely clinically better than he was yesterday.  Much more alert but nowhere near probable baseline.  He is open his eyes anything talk a few words with me at a time but he promptly falls asleep.  He does not appear toxic.  No family noted at bedside and I did reach out to daughter.  History and ROS not the most reliable but I will take the little bit of clinical improvement I noticed over the last 24 hours    Objective:    Scheduled Meds:aspirin, 81 mg, Oral, Daily  carbidopa-levodopa, 1 tablet, Oral, Q6H  memantine, 10 mg, Oral, Q12H  pantoprazole, 40 mg, Oral, QAM  pramipexole, 0.75 mg, Oral, TID  rivastigmine, 6 mg, Oral, BID With Meals  sodium chloride, 10 mL, Intravenous, Q12H      Continuous Infusions:sodium chloride, 100 mL/hr, Last Rate: 100 mL/hr (21 0731)        Vital signs in last 24 hours:  Temp:  [97.3 °F (36.3 °C)-98.8 °F (37.1 °C)] 98.3 °F (36.8 °C)  Heart Rate:  [54-71] 67  Resp:  [14-18] 16  BP: ()/(69-83) 149/83    Intake/Output:    Intake/Output Summary (Last 24 hours) at 2021 0853  Last data filed at 2021 0731  Gross per 24 hour   Intake 2000 ml   Output --   Net 2000 ml       Exam:  /83 (BP Location: Left arm, Patient Position: Lying)   Pulse 67   Temp 98.3 °F (36.8 °C) (Oral)   Resp 16   SpO2 93%     General Appearance:  More alert but not speaking in full sentences at this point, able to interact minimally, nontoxic elderly and frail, advanced PD                          Head:    Normocephalic, without obvious abnormality, atraumatic                           Eyes:    PERRL, conjunctivae/corneas clear, EOM's intact, both eyes                         Throat:   Oral mucosa  pink and moist                           Neck:   Supple, symmetrical, trachea midline, no JVD                         Lungs:    Clear to auscultation bilaterally, respirations unlabored                 Chest Wall:    No tenderness or deformity                          Heart:    Regular rate and rhythm, S1 and S2 normal                  Abdomen:     Soft, nontender, bowel sounds active                 Extremities:   No cyanosis or edema                        Pulses:   Pulses palpable in all extremities                            Skin:   Skin is warm and dry                  Neurologic:   PD tremor/tone     Data Review:  Labs in chart were reviewed.    Assessment:  Active Hospital Problems    Diagnosis  POA   • **Metabolic encephalopathy [G93.41]  Yes   • Altered mental status [R41.82]  Yes   • Parkinson's disease (HCC) [G20]  Yes   • Essential hypertension [I10]  Yes   • Generalized weakness [R53.1]  Yes      Resolved Hospital Problems   No resolved problems to display.       Plan:     Parkinson's disease advancing with associated dementia   -Agree with neurology increasing Sinemet   -Will continue with Requip as ordered 3 times daily   -PT OT and speech following    HLD with statin on hold for now    CCP to coordinate DC needs    Desmond Aburto MD  11/12/2021  08:53 EST

## 2021-11-13 PROBLEM — G93.41 METABOLIC ENCEPHALOPATHY: Status: RESOLVED | Noted: 2021-11-10 | Resolved: 2021-11-13

## 2021-11-13 PROBLEM — R41.82 ALTERED MENTAL STATUS: Status: RESOLVED | Noted: 2021-11-10 | Resolved: 2021-11-13

## 2021-11-13 PROCEDURE — 99232 SBSQ HOSP IP/OBS MODERATE 35: CPT | Performed by: NURSE PRACTITIONER

## 2021-11-13 PROCEDURE — 97110 THERAPEUTIC EXERCISES: CPT

## 2021-11-13 RX ADMIN — CARBIDOPA AND LEVODOPA 1 TABLET: 25; 100 TABLET ORAL at 23:27

## 2021-11-13 RX ADMIN — CARBIDOPA AND LEVODOPA 1 TABLET: 25; 100 TABLET ORAL at 17:02

## 2021-11-13 RX ADMIN — PRAMIPEXOLE DIHYDROCHLORIDE 0.75 MG: 0.5 TABLET ORAL at 22:42

## 2021-11-13 RX ADMIN — MEMANTINE HYDROCHLORIDE 10 MG: 10 TABLET, FILM COATED ORAL at 22:42

## 2021-11-13 RX ADMIN — SODIUM CHLORIDE 100 ML/HR: 9 INJECTION, SOLUTION INTRAVENOUS at 03:47

## 2021-11-13 RX ADMIN — CARBIDOPA AND LEVODOPA 1 TABLET: 25; 100 TABLET ORAL at 07:36

## 2021-11-13 RX ADMIN — RIVASTIGMINE TARTRATE 6 MG: 1.5 CAPSULE ORAL at 17:02

## 2021-11-13 RX ADMIN — CARBIDOPA AND LEVODOPA 1 TABLET: 25; 100 TABLET ORAL at 01:44

## 2021-11-13 RX ADMIN — PANTOPRAZOLE SODIUM 40 MG: 40 TABLET, DELAYED RELEASE ORAL at 07:36

## 2021-11-13 RX ADMIN — ASPIRIN 81 MG: 81 TABLET, COATED ORAL at 08:22

## 2021-11-13 RX ADMIN — PRAMIPEXOLE DIHYDROCHLORIDE 0.75 MG: 0.5 TABLET ORAL at 08:22

## 2021-11-13 RX ADMIN — SODIUM CHLORIDE, PRESERVATIVE FREE 10 ML: 5 INJECTION INTRAVENOUS at 22:43

## 2021-11-13 RX ADMIN — MEMANTINE HYDROCHLORIDE 10 MG: 10 TABLET, FILM COATED ORAL at 08:22

## 2021-11-13 RX ADMIN — RIVASTIGMINE TARTRATE 6 MG: 1.5 CAPSULE ORAL at 08:22

## 2021-11-13 RX ADMIN — CARBIDOPA AND LEVODOPA 1 TABLET: 25; 100 TABLET ORAL at 12:56

## 2021-11-13 RX ADMIN — PRAMIPEXOLE DIHYDROCHLORIDE 0.75 MG: 0.5 TABLET ORAL at 17:02

## 2021-11-13 NOTE — PLAN OF CARE
Goal Outcome Evaluation:  Plan of Care Reviewed With: patient           Outcome Summary: No acute events overnight.  BP was slightly elevated, ELIAZAR Parks notified; no new orders.  Will continue to monitor. Had several large incontinent voids, male purewick catheter was placed this morning.

## 2021-11-13 NOTE — PROGRESS NOTES
DOS: 2021  NAME: Rogelio Sher   : 1945  PCP: Naeem Garcia MD  Chief Complaint   Patient presents with   • Weakness - Generalized     Pt from [place]  generalized weakness over the past 5 days.     Neurology    Subjective: 3 of patient's family members are at the bedside.  His daughter reports that cognitively he is back to baseline but his mobility is not back to baseline as he was able to previously walk independently.  Pt seen in follow up today, however the problem is new to the examiner.      Objective:  Vital signs: /83 (BP Location: Left arm, Patient Position: Lying)   Pulse 71   Temp 96.8 °F (36 °C) (Oral)   Resp 16   SpO2 96%       HEENT: Normocephalic, atraumatic    COR: RRR  Resp: Even and unlabored, clear to auscultation bilaterally  Extremities: Equal radial pulses, no edema.    Neurological:   MS: Awake/alert.  Oriented to self and that the season is fall but states it is 2002.  Not oriented to location.  Some difficulty following commands.  No neglect.  CN: II-XII normal  Motor: 5/5, bilateral upper extremity resting tremor, right greater than left, with bilateral upper extremity cogwheeling and increased tone in bilateral lower extremities.  Reflexes: Plantars withdrawal/equivocal.  Sensory: Intact/symmetric to light touch in arms and legs.  Coordination: No metria in upper extremities.  Patient attempted finger-to-nose but had difficulty following complex commands.    Scheduled Meds:aspirin, 81 mg, Oral, Daily  carbidopa-levodopa, 1 tablet, Oral, Q6H  memantine, 10 mg, Oral, Q12H  pantoprazole, 40 mg, Oral, QAM  pramipexole, 0.75 mg, Oral, TID  rivastigmine, 6 mg, Oral, BID With Meals  sodium chloride, 10 mL, Intravenous, Q12H      Continuous Infusions:   PRN Meds:.•  acetaminophen **OR** acetaminophen **OR** acetaminophen  •  nitroglycerin  •  ondansetron  •  sodium chloride  •  sodium chloride    Laboratory results:  Lab Results   Component Value Date     GLUCOSE 97 11/10/2021    CALCIUM 9.0 11/10/2021     11/10/2021    K 3.8 11/10/2021    CO2 24.7 11/10/2021     11/10/2021    BUN 21 11/10/2021    CREATININE 1.10 11/10/2021    EGFRIFNONA 65 11/10/2021    BCR 19.1 11/10/2021    ANIONGAP 14.3 11/10/2021     Lab Results   Component Value Date    WBC 11.17 (H) 11/10/2021    HGB 14.8 11/10/2021    HCT 43.8 11/10/2021    MCV 97.3 (H) 11/10/2021     11/10/2021     No results found for: CHOL  No results found for: HDL  No results found for: LDL  No results found for: TRIG       Review and interpretation of imaging:  CT Head Without Contrast    Result Date: 11/10/2021  CT SCAN OF THE BRAIN WITHOUT CONTRAST  HISTORY: Generalized weakness. Dementia. Confusion.  The CT scan was performed as an emergency procedure through the brain without contrast. There is moderate diffuse atrophy and chronic small vessel ischemic change. There is no evidence of acute intracranial hemorrhage or mass effect. There is some polypoid mucosal thickening or mucous retention cyst in the right maxillary sinus.      Radiation dose reduction techniques were utilized, including automated exposure control and exposure modulation based on body size.  This report was finalized on 11/10/2021 8:46 PM by Dr. Jamal Ang M.D.      XR Chest 1 View    Result Date: 11/10/2021  ONE VIEW PORTABLE CHEST AT 4:52 PM  HISTORY: Weakness and dizziness. Kyphosis. Hypertension.  FINDINGS: There is poor lung expansion with some vague vascular crowding and possible mild vascular congestion compared to the study of 02/01/2018. The heart is enlarged without change.  This report was finalized on 11/10/2021 5:56 PM by Dr. Jamal Ang M.D.        Impression:  This patient is a 76-year-old male with HTN, HLD, GERD, previous kidney stone, and Parkinson's disease with associated dementia who was admitted 11/10 with generalized weakness and AMS.  Neurology was consulted for worsening Parkinson's disease with  increased falls.  He was taking Sinemet 25/100 mg 3 times daily as well as Mirapex, rivastigmine, and amantadine.  He lives in a memory care unit.  He is followed by neurology as an outpatient.  MRI of the brain was ordered due to questionable right upper extremity weakness.  And family noted patient was recently leaning to the right however other family members noted he had been doing that for some time.    Plan:  Cognitively he is back to baseline per his daughter.  Follow-up MRI brain  Sinemet increased to 4 times daily, no obvious side effects noted to increase dose  Pramipexole, rivastigmine, and Namenda continued at home doses  Recommend outpatient follow-up with a movement disorders source such as Dr. Bartholomew or Dr. Fields at [facility], referral placed.  PT/OT/ST  D/W Dr Weaver today.  Follow results of MRI peripherally and if negative will sign off.  If MRI brain is abnormal such as stroke we will address this.  Please call if further questions or concerns.

## 2021-11-13 NOTE — PROGRESS NOTES
DOS: 2021  NAME: Rogelio Sher   : 1945  PCP: Naeem Garcia MD  Chief Complaint   Patient presents with   • Weakness - Generalized     Pt from [place]  generalized weakness over the past 5 days.     Neurology    Subjective: 3 of patient's family members are at the bedside.  His daughter reports that cognitively he is back to baseline but his mobility is not back to baseline as he was able to previously walk independently.  Pt seen in follow up today, however the problem is new to the examiner.      Objective:  Vital signs: /97   Pulse 65   Temp 98.1 °F (36.7 °C) (Axillary)   Resp 16   SpO2 95%       HEENT: Normocephalic, atraumatic    COR: RRR  Resp: Even and unlabored, clear to auscultation bilaterally  Extremities: Equal radial pulses, no edema.    Neurological:   MS: Awake/alert.  Oriented to self and that the season is fall but states it is 2002.  Not oriented to location.  Some difficulty following commands.  No neglect.  CN: II-XII normal  Motor: 5/5, bilateral upper extremity resting tremor, right greater than left, with bilateral upper extremity cogwheeling and increased tone in bilateral lower extremities.  Reflexes: Plantars withdrawal/equivocal.  Sensory: Intact/symmetric to light touch in arms and legs.  Coordination: No metria in upper extremities.  Patient attempted finger-to-nose but had difficulty following complex commands.    Scheduled Meds:aspirin, 81 mg, Oral, Daily  carbidopa-levodopa, 1 tablet, Oral, Q6H  diphenhydrAMINE, 25 mg, Intravenous, On Call  memantine, 10 mg, Oral, Q12H  pantoprazole, 40 mg, Oral, QAM  pramipexole, 0.75 mg, Oral, TID  rivastigmine, 6 mg, Oral, BID With Meals  sodium chloride, 10 mL, Intravenous, Q12H      Continuous Infusions:   PRN Meds:.•  acetaminophen **OR** acetaminophen **OR** acetaminophen  •  nitroglycerin  •  ondansetron  •  sodium chloride  •  sodium chloride    Laboratory results:  Lab Results   Component Value  Date    GLUCOSE 97 11/10/2021    CALCIUM 9.0 11/10/2021     11/10/2021    K 3.8 11/10/2021    CO2 24.7 11/10/2021     11/10/2021    BUN 21 11/10/2021    CREATININE 1.10 11/10/2021    EGFRIFNONA 65 11/10/2021    BCR 19.1 11/10/2021    ANIONGAP 14.3 11/10/2021     Lab Results   Component Value Date    WBC 11.17 (H) 11/10/2021    HGB 14.8 11/10/2021    HCT 43.8 11/10/2021    MCV 97.3 (H) 11/10/2021     11/10/2021     No results found for: CHOL  No results found for: HDL  No results found for: LDL  No results found for: TRIG       Review and interpretation of imaging:    Impression:  This patient is a 76-year-old male with HTN, HLD, GERD, previous kidney stone, and Parkinson's disease with associated dementia who was admitted 11/10 with generalized weakness and AMS.  Neurology was consulted for worsening Parkinson's disease with increased falls.  He was taking Sinemet 25/100 mg 3 times daily as well as Mirapex, rivastigmine, and amantadine.  He lives in a memory care unit.  He is followed by neurology as an outpatient.  MRI of the brain was ordered due to questionable right upper extremity weakness.  And family noted patient was recently leaning to the right however other family members noted he had been doing that for some time.    Plan:  Cognitively he is back to baseline per his daughter.  Follow-up MRI brain  Sinemet increased to 4 times daily, no obvious side effects noted to increase dose  Pramipexole, rivastigmine, and Namenda continued at home doses  Recommend outpatient follow-up with a movement disorders source such as Dr. Bartholomew or Dr. Fields at  of , referral placed.

## 2021-11-13 NOTE — PROGRESS NOTES
DAILY PROGRESS NOTE  [facility]    Patient Identification:  Name: Rogelio Sher  Age: 76 y.o.  Sex: male  :  1945  MRN: [id number]         Primary Care Physician: Naeem Garcia MD    Subjective:  Interval History: No new issues.  History and ROS is difficult to understand at times due to his speech.  He is definitely better in regards to his alertness and his ability to try to conversation.  After discussing with daughter she feels that his mentation is at baseline.  Physically he is still not there yet as he was independently ambulating.  He denies any chest pain or troubles breathing at this time    Objective: Advanced PD with parkinsonian traits.  No family at bedside    Scheduled Meds:aspirin, 81 mg, Oral, Daily  carbidopa-levodopa, 1 tablet, Oral, Q6H  diphenhydrAMINE, 25 mg, Intravenous, On Call  memantine, 10 mg, Oral, Q12H  pantoprazole, 40 mg, Oral, QAM  pramipexole, 0.75 mg, Oral, TID  rivastigmine, 6 mg, Oral, BID With Meals  sodium chloride, 10 mL, Intravenous, Q12H      Continuous Infusions:     Vital signs in last 24 hours:  Temp:  [97.9 °F (36.6 °C)-99.1 °F (37.3 °C)] 98.1 °F (36.7 °C)  Heart Rate:  [64-78] 65  Resp:  [16-18] 16  BP: (146-176)/(84-97) 176/97    Intake/Output:    Intake/Output Summary (Last 24 hours) at 2021 0822  Last data filed at 2021 1709  Gross per 24 hour   Intake 1000 ml   Output --   Net 1000 ml       Exam:  /97   Pulse 65   Temp 98.1 °F (36.7 °C) (Axillary)   Resp 16   SpO2 95%     General Appearance:    Alert, cooperative/follows commands, advanced PD                          Head:    Normocephalic, without obvious abnormality, atraumatic                           Eyes:    PERRL, conjunctivae/corneas clear, EOM's intact, both eyes                         Throat:   Oral mucosa pink and moist                           Neck:   Supple, symmetrical, trachea midline, no JVD                         Lungs:    Clear to auscultation  bilaterally, respirations unlabored                          Heart:    Regular rate and rhythm, S1 and S2 normal                  Abdomen:     Soft, nontender, bowel sounds active                 Extremities: Generalized weakness R>L no moving all, no cyanosis or edema                        Pulses:   Pulses palpable in all extremities                            Skin:   Skin is warm and dry                  Data Review:  Labs in chart were reviewed.    Assessment:  Active Hospital Problems    Diagnosis  POA   • **Parkinson's disease (HCC) [G20]  Yes   • Essential hypertension [I10]  Yes   • Generalized weakness [R53.1]  Yes      Resolved Hospital Problems    Diagnosis Date Resolved POA   • Altered mental status [R41.82] 11/13/2021 Yes   • Metabolic encephalopathy [G93.41] 11/13/2021 Yes       Plan:    Parkinson's disease advancing with associated dementia              -Agree with neurology increasing Sinemet              -Will continue with Requip as ordered 3 times daily              -PT OT and speech following   -MRI pending      HLD with statin on hold for now     CCP to coordinate DC needs -mild clinical improvement is noted -await further neuro recommendations    [phone] - daughter - mental at baseline/physically not (independently ambulating prior to admission)    Desmond Aburto MD  11/13/2021  08:22 EST

## 2021-11-13 NOTE — PLAN OF CARE
Goal Outcome Evaluation:  Plan of Care Reviewed With: patient        Progress: no change  Outcome Summary: Pt up in room upon PT arrival and PT assist in ambulating 3' to recliner. Pt required max verbal cues and gestures to return to chair and perseverating on needing to get gas. Chair alarm rechecked prior to PT leaving room and pt given call light and instructed, at length, to press call light if ne needs to get up. Pt verbalized understanding, however continues to demonstrate confusion. Nsg notified.    Patient was not wearing a face mask during this therapy encounter. Therapist used appropriate personal protective equipment including eye protection, mask, and gloves.  Mask used was standard procedure mask. Appropriate PPE was worn during the entire therapy session. Hand hygiene was completed before and after therapy session. Patient is not in enhanced droplet precautions.

## 2021-11-13 NOTE — THERAPY TREATMENT NOTE
Patient Name: Rogelio Sher  : 1945    MRN: [id number]                              Today's Date: 2021       Admit Date: 11/10/2021    Visit Dx:     ICD-10-CM ICD-9-CM   1. Altered mental status, unspecified altered mental status type  R41.82 780.97   2. Parkinson's disease (HCC)  G20 332.0   3. Hematuria, unspecified type  R31.9 599.70   4. Ambulatory dysfunction  R26.2 719.7     Patient Active Problem List   Diagnosis   • Generalized weakness   • Parkinson's disease (HCC)   • Essential hypertension   • Influenza A     Past Medical History:   Diagnosis Date   • Chorioretinitis     histoplasmosis left eye   • GERD (gastroesophageal reflux disease)    • Hyperlipidemia    • Hypertension    • Kidney stone    • Parkinson's disease (HCC)      Past Surgical History:   Procedure Laterality Date   • CATARACT EXTRACTION, BILATERAL     • COLONOSCOPY     • KIDNEY STONE SURGERY     • TONSILLECTOMY        General Information     Row Name 21 1545          Physical Therapy Time and Intention    Document Type therapy note (daily note)  -CN     Mode of Treatment individual therapy; physical therapy  -CN     Row Name 21 1545          General Information    Patient Profile Reviewed yes  -CN     Existing Precautions/Restrictions fall  -CN     Row Name 21 1545          Cognition    Orientation Status (Cognition) unable/difficult to assess  Pt perseverating on needing gas and talking about travel; attempted to orient several times  -CN     Row Name 21 1545          Safety Issues, Functional Mobility    Safety Issues Affecting Function (Mobility) ability to follow commands; awareness of need for assistance; impulsivity; insight into deficits/self-awareness; judgment; problem-solving; safety precaution awareness; safety precautions follow-through/compliance  -CN     Impairments Affecting Function (Mobility) cognition; endurance/activity tolerance; strength; range of motion (ROM); motor control; motor  "planning; coordination; postural/trunk control  -CN           User Key  (r) = Recorded By, (t) = Taken By, (c) = Cosigned By    Initials Name Provider Type    Danika Fatima, LOLY Physical Therapist               Mobility     Row Name 11/13/21 4157          Bed Mobility    Comment (Bed Mobility) Pt standing in room upon PT arrival, chair alarm did not sound.  -CN     Row Name 11/13/21 1547          Sit-Stand Transfer    Sit-Stand Blair (Transfers) minimum assist (75% patient effort); verbal cues  -CN     Assistive Device (Sit-Stand Transfers) other (see comments)  HHA x2  -CN     Row Name 11/13/21 1547          Gait/Stairs (Locomotion)    Blair Level (Gait) minimum assist (75% patient effort); moderate assist (50% patient effort); 2 person assist; verbal cues; nonverbal cues (demo/gesture)  -CN     Assistive Device (Gait) other (see comments)  HHA x2  -CN     Distance in Feet (Gait) 3' to chair  -CN     Deviations/Abnormal Patterns (Gait) bilateral deviations; festinating/shuffling; stride length decreased  -CN     Bilateral Gait Deviations forward flexed posture  -CN     Comment (Gait/Stairs) Pt confused and required max verbal cues to return to chair. Pt found up in room upon PT arrival and continued to state he \"needed to get gas\".  -CN           User Key  (r) = Recorded By, (t) = Taken By, (c) = Cosigned By    Initials Name Provider Type    Danika Fatima, PT Physical Therapist               Obj/Interventions    No documentation.                Goals/Plan    No documentation.                Clinical Impression     Row Name 11/13/21 3818          Pain    Additional Documentation Pain Scale: FACES Pre/Post-Treatment (Group)  -CN     [facility] Name 11/13/21 2849          Pain Scale: FACES Pre/Post-Treatment    Pain: FACES Scale, Pretreatment 0-->no hurt  -CN     Posttreatment Pain Rating 0-->no hurt  -CN     [facility] Name 11/13/21 1523          Plan of Care Review    Plan of Care Reviewed " With patient  -CN     Progress no change  -CN     Outcome Summary Pt up in room upon PT arrival and PT assist in ambulating 3' to recliner. Pt required max verbal cues and gestures to return to chair and perseverating on needing to get gas. Chair alarm rechecked prior to PT leaving room and pt given call light and instructed, at length, to press call light if ne needs to get up. Pt verbalized understanding, however continues to demonstrate confusion. Nsg notified.  -CN     Row Name 11/13/21 9490          Therapy Assessment/Plan (PT)    Criteria for Skilled Interventions Met (PT) yes; skilled treatment is necessary  -CN     Row Name 11/13/21 1580          Positioning and Restraints    Pre-Treatment Position standing in room  -CN     Post Treatment Position chair  -CN     In Chair reclined; call light within reach; encouraged to call for assist; exit alarm on; notified nsg  -CN           User Key  (r) = Recorded By, (t) = Taken By, (c) = Cosigned By    Initials Name Provider Type    Danika Fatima, PT Physical Therapist               Outcome Measures     Row Name 11/13/21 6181          How much help from another person do you currently need...    Turning from your back to your side while in flat bed without using bedrails? 3  -CN     Moving from lying on back to sitting on the side of a flat bed without bedrails? 3  -CN     Moving to and from a bed to a chair (including a wheelchair)? 3  -CN     Standing up from a chair using your arms (e.g., wheelchair, bedside chair)? 3  -CN     Climbing 3-5 steps with a railing? 2  -CN     To walk in hospital room? 2  -CN     AM-PAC 6 Clicks Score (PT) 16  -CN     Row Name 11/13/21 5750          Functional Assessment    Outcome Measure Options AM-PAC 6 Clicks Basic Mobility (PT)  -CN           User Key  (r) = Recorded By, (t) = Taken By, (c) = Cosigned By    Initials Name Provider Type    Danika Fatima, PT Physical Therapist                              Physical Therapy Education                 Title: PT OT SLP Therapies (In Progress)     Topic: Physical Therapy (In Progress)     Point: Mobility training (In Progress)     Learning Progress Summary           Patient Acceptance, E, NR by CN at 11/13/2021 1553    Acceptance, E, VU,NR by  at 11/12/2021 1551   Family Acceptance, E, VU,NR by  at 11/12/2021 1551                   Point: Home exercise program (Not Started)     Learner Progress:  Not documented in this visit.          Point: Body mechanics (In Progress)     Learning Progress Summary           Patient Acceptance, E, NR by CN at 11/13/2021 1553    Acceptance, E, VU,NR by MW at 11/12/2021 1551   Family Acceptance, E, VU,NR by  at 11/12/2021 1551                   Point: Precautions (In Progress)     Learning Progress Summary           Patient Acceptance, E, NR by CN at 11/13/2021 1553    Acceptance, E, VU,NR by MW at 11/12/2021 1551   Family Acceptance, E, VU,NR by  at 11/12/2021 1551                               User Key     Initials Effective Dates Name Provider Type Discipline    AURELIA 06/16/21 -  Danika Black, PT Physical Therapist PT     06/16/21 -  Yahaira Dugan PT Physical Therapist PT              PT Recommendation and Plan     Plan of Care Reviewed With: patient  Progress: no change  Outcome Summary: Pt up in room upon PT arrival and PT assist in ambulating 3' to recliner. Pt required max verbal cues and gestures to return to chair and perseverating on needing to get gas. Chair alarm rechecked prior to PT leaving room and pt given call light and instructed, at length, to press call light if ne needs to get up. Pt verbalized understanding, however continues to demonstrate confusion. Nsg notified.     Patient was not wearing a face mask during this therapy encounter. Therapist used appropriate personal protective equipment including eye protection, mask, and gloves.  Mask used was standard procedure mask. Appropriate PPE was worn  during the entire therapy session. Hand hygiene was completed before and after therapy session. Patient is not in enhanced droplet precautions.        Time Calculation:    PT Charges     Row Name 11/13/21 1553             Time Calculation    Start Time 1352  -CN      Stop Time 1402  -CN      Time Calculation (min) 10 min  -CN      PT Received On 11/13/21  -CN      PT - Next Appointment 11/15/21  -CN              Timed Charges    40046 - PT Therapeutic Exercise Minutes 10  -CN              Total Minutes    Timed Charges Total Minutes 10  -CN       Total Minutes 10  -CN            User Key  (r) = Recorded By, (t) = Taken By, (c) = Cosigned By    Initials Name Provider Type    Danika Fatima, PT Physical Therapist              Therapy Charges for Today     Code Description Service Date Service Provider Modifiers Qty    61479564880 HC PT THER PROC EA 15 MIN 11/13/2021 Danika Black, LOLY GP 1          PT G-Codes  Outcome Measure Options: AM-PAC 6 Clicks Basic Mobility (PT)  AM-PAC 6 Clicks Score (PT): 16  AM-PAC 6 Clicks Score (OT): 14  Modified [place] Scale: 4 - Moderately severe disability.  Unable to walk without assistance, and unable to attend to own bodily needs without assistance.    Danika Black PT  11/13/2021

## 2021-11-14 ENCOUNTER — APPOINTMENT (OUTPATIENT)
Dept: MRI IMAGING | Facility: HOSPITAL | Age: 76
End: 2021-11-14

## 2021-11-14 PROCEDURE — 70551 MRI BRAIN STEM W/O DYE: CPT

## 2021-11-14 RX ADMIN — MEMANTINE HYDROCHLORIDE 10 MG: 10 TABLET, FILM COATED ORAL at 09:29

## 2021-11-14 RX ADMIN — CARBIDOPA AND LEVODOPA 1 TABLET: 25; 100 TABLET ORAL at 23:29

## 2021-11-14 RX ADMIN — PRAMIPEXOLE DIHYDROCHLORIDE 0.75 MG: 0.5 TABLET ORAL at 17:11

## 2021-11-14 RX ADMIN — SODIUM CHLORIDE, PRESERVATIVE FREE 10 ML: 5 INJECTION INTRAVENOUS at 23:29

## 2021-11-14 RX ADMIN — PRAMIPEXOLE DIHYDROCHLORIDE 0.75 MG: 0.5 TABLET ORAL at 23:29

## 2021-11-14 RX ADMIN — SODIUM CHLORIDE, PRESERVATIVE FREE 10 ML: 5 INJECTION INTRAVENOUS at 09:29

## 2021-11-14 RX ADMIN — RIVASTIGMINE TARTRATE 6 MG: 1.5 CAPSULE ORAL at 09:28

## 2021-11-14 RX ADMIN — RIVASTIGMINE TARTRATE 6 MG: 1.5 CAPSULE ORAL at 17:11

## 2021-11-14 RX ADMIN — CARBIDOPA AND LEVODOPA 1 TABLET: 25; 100 TABLET ORAL at 05:20

## 2021-11-14 RX ADMIN — PANTOPRAZOLE SODIUM 40 MG: 40 TABLET, DELAYED RELEASE ORAL at 05:22

## 2021-11-14 RX ADMIN — CARBIDOPA AND LEVODOPA 1 TABLET: 25; 100 TABLET ORAL at 17:13

## 2021-11-14 RX ADMIN — CARBIDOPA AND LEVODOPA 1 TABLET: 25; 100 TABLET ORAL at 12:38

## 2021-11-14 RX ADMIN — MEMANTINE HYDROCHLORIDE 10 MG: 10 TABLET, FILM COATED ORAL at 23:29

## 2021-11-14 RX ADMIN — PRAMIPEXOLE DIHYDROCHLORIDE 0.75 MG: 0.5 TABLET ORAL at 09:28

## 2021-11-14 RX ADMIN — ASPIRIN 81 MG: 81 TABLET, COATED ORAL at 12:38

## 2021-11-14 NOTE — PROGRESS NOTES
DAILY PROGRESS NOTE  [facility]    Patient Identification:  Name: Rogelio Sher  Age: 76 y.o.  Sex: male  :  1945  MRN: [id number]         Primary Care Physician: Naeem Garcia MD    Subjective:  Interval History: Resting upon entering the room.  He was arousable but did not really want to participate much in talking this morning.  While resting you can see him with intermittent spastic PD like tremor    Objective: No family at bedside.  Advanced PD.    Scheduled Meds:aspirin, 81 mg, Oral, Daily  carbidopa-levodopa, 1 tablet, Oral, Q6H  memantine, 10 mg, Oral, Q12H  pantoprazole, 40 mg, Oral, QAM  pramipexole, 0.75 mg, Oral, TID  rivastigmine, 6 mg, Oral, BID With Meals  sodium chloride, 10 mL, Intravenous, Q12H      Continuous Infusions:     Vital signs in last 24 hours:  Temp:  [96.8 °F (36 °C)-98.8 °F (37.1 °C)] 98.8 °F (37.1 °C)  Heart Rate:  [66-84] 66  Resp:  [16-20] 20  BP: (111-163)/(78-94) 141/86    Intake/Output:  No intake or output data in the 24 hours ending 21 0816    Exam:  /86 (BP Location: Left arm, Patient Position: Lying)   Pulse 66   Temp 98.8 °F (37.1 °C) (Oral)   Resp 20   SpO2 93%     General Appearance:    Alerts, advanced PD                         Throat:   Oral mucosa pink and moist                           Neck:   No JVD                         Lungs:    Decreased at bases otherwise clear to auscultation bilaterally, respirations unlabored                 Chest Wall:    No tenderness or deformity                          Heart:    Regular rate and rhythm, S1 and S2 normal                  Abdomen:     Soft, nontender, bowel sounds active                 Extremities:   No cyanosis or edema                        Pulses:   Pulses palpable in all extremities                            Skin:   Skin is warm and dry                  Neurologic:   CNII-XII intact, PD affect/tone    Data Review:  Labs in chart were reviewed.    Assessment:  Active  Hospital Problems    Diagnosis  POA   • **Parkinson's disease (HCC) [G20]  Yes   • Essential hypertension [I10]  Yes   • Generalized weakness [R53.1]  Yes      Resolved Hospital Problems    Diagnosis Date Resolved POA   • Altered mental status [R41.82] 11/13/2021 Yes   • Metabolic encephalopathy [G93.41] 11/13/2021 Yes       Plan:    Parkinson's disease advancing with associated dementia              -Agree with neurology increasing Sinemet              -Will continue with Requip as ordered 3 times daily              -PT OT and speech following              -MRI still pending    -At mental baseline per discussion with daughter yesterday.  I have not really been able to witness much other clinical improvement     HLD with statin on hold for now     CCP to coordinate DC needs -mild clinical improvement is noted -await further neuro recommendations        Desmond Aburto MD  11/14/2021  08:16 EST

## 2021-11-14 NOTE — PLAN OF CARE
Goal Outcome Evaluation:  Plan of Care Reviewed With: patient        Progress: improving  Outcome Summary: Patient slept well overnight, no acute events.

## 2021-11-15 VITALS
RESPIRATION RATE: 16 BRPM | HEART RATE: 63 BPM | DIASTOLIC BLOOD PRESSURE: 73 MMHG | SYSTOLIC BLOOD PRESSURE: 139 MMHG | OXYGEN SATURATION: 93 % | TEMPERATURE: 98.1 F

## 2021-11-15 PROCEDURE — 97116 GAIT TRAINING THERAPY: CPT

## 2021-11-15 PROCEDURE — 97530 THERAPEUTIC ACTIVITIES: CPT

## 2021-11-15 RX ADMIN — PRAMIPEXOLE DIHYDROCHLORIDE 0.75 MG: 0.5 TABLET ORAL at 08:55

## 2021-11-15 RX ADMIN — ASPIRIN 81 MG: 81 TABLET, COATED ORAL at 08:55

## 2021-11-15 RX ADMIN — SODIUM CHLORIDE, PRESERVATIVE FREE 10 ML: 5 INJECTION INTRAVENOUS at 08:57

## 2021-11-15 RX ADMIN — PANTOPRAZOLE SODIUM 40 MG: 40 TABLET, DELAYED RELEASE ORAL at 07:07

## 2021-11-15 RX ADMIN — CARBIDOPA AND LEVODOPA 1 TABLET: 25; 100 TABLET ORAL at 14:03

## 2021-11-15 RX ADMIN — CARBIDOPA AND LEVODOPA 1 TABLET: 25; 100 TABLET ORAL at 07:07

## 2021-11-15 RX ADMIN — MEMANTINE HYDROCHLORIDE 10 MG: 10 TABLET, FILM COATED ORAL at 08:56

## 2021-11-15 RX ADMIN — RIVASTIGMINE TARTRATE 6 MG: 1.5 CAPSULE ORAL at 08:57

## 2021-11-15 NOTE — PROGRESS NOTES
Case Management Discharge Note      Final Note: Pt discharged. Per Madelin Guevara can accept pt today for skilled rehab ([facility]). [facility] ([facility] and [facility] ([facility] have no beds. Unable to reach [facility]. [facility] stretcher scheduled at 3:00 PM (ID: [id number]). Pt/family updated at bedside and agreeable. Packet provided to RN. Carolyn Berry LCSW    Provided Post Acute Provider List?: Yes  Post Acute Provider List: Nursing Home    Selected Continued Care - Admitted Since 11/10/2021     Destination Coordination complete.    Service Provider Selected Services Address Phone Fax Patient Preferred    [facility]  Skilled Nursing [address] [place] [id number] [phone] --          Durable Medical Equipment    No services have been selected for the patient.              Dialysis/Infusion    No services have been selected for the patient.              Home Medical Care    No services have been selected for the patient.              Therapy    No services have been selected for the patient.              Community Resources    No services have been selected for the patient.              Community & DME    No services have been selected for the patient.                  Transportation Services  Ambulance: [facility] (stretcher)    Final Discharge Disposition Code: 03 - skilled nursing facility (SNF)

## 2021-11-15 NOTE — PLAN OF CARE
Goal Outcome Evaluation:  Plan of Care Reviewed With: patient        Progress: improving  Outcome Summary: Pt tolerated treatment with no complaints. Pt is improving with mobility but still requires an assist of 1-2. Pt is MinAX2 with bed mobility and with sit<->stand transfers. Pt increased ambulation distance, ambulating 40ft with rwx. Irasema/ModA. Pt required sequencing cues and also for pt to take bigger steps. Pt demos shuffling gait pattern throughout. Will continue to progress pt as tolerated.    ..Patient was wearing a face mask during this therapy encounter. Therapist used appropriate personal protective equipment including eye protection, mask, and gloves.  Mask used was standard procedure mask. Appropriate PPE was worn during the entire therapy session. Hand hygiene was completed before and after therapy session. Patient is not in enhanced droplet precautions.

## 2021-11-15 NOTE — DISCHARGE SUMMARY
[facility]    Date of Discharge:  11/15/2021    PCP: Naeem Garcia MD    Discharge Diagnosis:   Active Hospital Problems    Diagnosis  POA   • **Parkinson's disease (HCC) [G20]  Yes   • Essential hypertension [I10]  Yes   • Generalized weakness [R53.1]  Yes      Resolved Hospital Problems    Diagnosis Date Resolved POA   • Altered mental status [R41.82] 11/13/2021 Yes   • Metabolic encephalopathy [G93.41] 11/13/2021 Yes          Consults     Date and Time Order Name Status Description    11/11/2021 12:00 AM Inpatient Neurology Consult General Completed     11/10/2021  8:38 PM [facility] (on-call MD unless specified) Details          Hospital Course  76 y.o. male who unfortunately suffers from quite advanced Parkinson's disease.  His daughter is an OB/GYN here at [facility] and he can reach her at [phone].  She has been a great asset not helping me into figure out this patient's baseline because of his advanced Parkinson's affects his speech as well as mentation.  He came in a bit more confused and currently the daughter feels that he is at baseline.  I can still appreciate some confusion at times but she definitely knows her father better than I do.  As a result of his PD I consulted neuro and they have made adjustments to his medications.  His Sinemet has been titrated up to 4 times daily and he will remain on Requip.  He has not made progress in regards to physical improvement.  He still quite weak and has a Parkinson's-like posture/rigidity.  Physical therapy is following.  This patient will definitely need some subacute rehab.  [facility] needs to coordinate this.  I feel this patient is medically stable to be transitioned at any time once the above logistics are handled.  MRI of the brain did not demonstrate any new acute disease but showed chronic changes.  I discussed the case on a near daily basis with the daughter and all involved are on the same page  and amenable to the above plan.        Condition on Discharge: Improved.     Temp:  [97.3 °F (36.3 °C)-99.2 °F (37.3 °C)] 98.1 °F (36.7 °C)  Heart Rate:  [57-68] 63  Resp:  [16-20] 16  BP: (120-156)/(70-86) 139/73  There is no height or weight on file to calculate BMI.    Physical Exam  Constitutional:       Comments: Advanced PD   HENT:      Head: Normocephalic.      Nose: Nose normal.      Mouth/Throat:      Mouth: Mucous membranes are moist.      Pharynx: Oropharynx is clear.   Eyes:      General: No scleral icterus.     Extraocular Movements: Extraocular movements intact.      Pupils: Pupils are equal, round, and reactive to light.   Cardiovascular:      Rate and Rhythm: Normal rate and regular rhythm.   Pulmonary:      Effort: Pulmonary effort is normal. No respiratory distress.      Breath sounds: Normal breath sounds.   Abdominal:      General: Bowel sounds are normal. There is no distension.      Palpations: Abdomen is soft.      Tenderness: There is no abdominal tenderness.   Musculoskeletal:         General: No swelling.   Skin:     General: Skin is warm and dry.      Coloration: Skin is not jaundiced.   Neurological:      Mental Status: He is alert.      Motor: Weakness present.      Gait: Gait abnormal.      Comments: PD affect with posturing and rigidity     [unfilled]    Disposition: Skilled Nursing Facility (DC - External)       Discharge Medications      Changes to Medications      Instructions Start Date   carbidopa-levodopa  MG per tablet  Commonly known as: SINEMET  What changed: when to take this   1 tablet, Oral, Every 6 Hours Scheduled         Continue These Medications      Instructions Start Date   aspirin 81 MG EC tablet   81 mg, Oral, Daily      atorvastatin 20 MG tablet  Commonly known as: LIPITOR   20 mg, Oral, Daily      multivitamin with minerals tablet tablet   1 tablet, Oral, Daily      Namenda XR 28 MG capsule sustained-release 24 hr extended release capsule  Generic drug:  memantine   10 mg, Oral, 2 Times Daily      omeprazole 20 MG capsule  Commonly known as: priLOSEC   20 mg, Oral, Daily      pramipexole 1 MG tablet  Commonly known as: MIRAPEX   0.7 mg, Oral, 3 Times Daily      rivastigmine 6 MG capsule  Commonly known as: EXELON   6 mg, Oral, 2 Times Daily      vitamin B-12 500 MCG tablet  Commonly known as: CYANOCOBALAMIN   500 mcg, Oral, Daily      vitamin C 500 MG tablet  Commonly known as: ASCORBIC ACID   500 mg, Oral, Daily         Stop These Medications    valsartan-hydrochlorothiazide 160-12.5 MG per tablet  Commonly known as: DIOVAN-HCT             Additional Instructions for the Follow-ups that You Need to Schedule     Discharge Follow-up with PCP   As directed       Currently Documented PCP:    Naeem Garcia MD    PCP Phone Number:    [phone]     Follow Up Details: PCP post rehab.  Neuro further recommendations            Follow-up Information     Levi Bartholomew MD .    Specialty: Neurology  Contact information:  [address] [facility] [address] [place] [address] [phone]             Naeem Garcia MD .    Specialty: Internal Medicine  Why: PCP post rehab.  Neuro further recommendations  Contact information:  [address] [address] [phone]                           Desmond Aburto MD  11/15/21  08:47 EST    Discharge time spent greater than 30 minutes.

## 2021-11-15 NOTE — PLAN OF CARE
Goal Outcome Evaluation:  Plan of Care Reviewed With: patient, daughter        Progress: improving  Outcome Summary: No acute events overnight.  Patient slept soundly.  Reviewed plan of care with patient's daughter, she expressed that she feels like the patient is not at his baseline with ambulation.

## 2021-11-15 NOTE — THERAPY TREATMENT NOTE
Patient Name: Rogelio Sher  : 1945    MRN: [id number]                              Today's Date: 11/15/2021       Admit Date: 11/10/2021    Visit Dx:     ICD-10-CM ICD-9-CM   1. Altered mental status, unspecified altered mental status type  R41.82 780.97   2. Parkinson's disease (HCC)  G20 332.0   3. Hematuria, unspecified type  R31.9 599.70   4. Ambulatory dysfunction  R26.2 719.7     Patient Active Problem List   Diagnosis   • Generalized weakness   • Parkinson's disease (HCC)   • Essential hypertension   • Influenza A     Past Medical History:   Diagnosis Date   • Chorioretinitis     histoplasmosis left eye   • GERD (gastroesophageal reflux disease)    • Hyperlipidemia    • Hypertension    • Kidney stone    • Parkinson's disease (HCC)      Past Surgical History:   Procedure Laterality Date   • CATARACT EXTRACTION, BILATERAL     • COLONOSCOPY     • KIDNEY STONE SURGERY     • TONSILLECTOMY        General Information     Row Name 11/15/21 1327          Physical Therapy Time and Intention    Document Type therapy note (daily note)  -EB     Mode of Treatment individual therapy; physical therapy  -EB     Row Name 11/15/21 1327          General Information    Existing Precautions/Restrictions fall  -EB     Row Name 11/15/21 1327          Cognition    Orientation Status (Cognition) oriented to; person; place  -EB     Row Name 11/15/21 1327          Safety Issues, Functional Mobility    Safety Issues Affecting Function (Mobility) ability to follow commands; awareness of need for assistance  -EB     Impairments Affecting Function (Mobility) cognition; endurance/activity tolerance; strength; range of motion (ROM); motor control; motor planning; coordination; postural/trunk control; balance  -EB           User Key  (r) = Recorded By, (t) = Taken By, (c) = Cosigned By    Initials Name Provider Type    EB Renea Olivo PTA Physical Therapy Assistant               Mobility     Row Name 11/15/21 1327          Bed  Mobility    Supine-Sit [place] (Bed Mobility) minimum assist (75% patient effort); 2 person assist  -EB     Sit-Supine [place] (Bed Mobility) not tested  -EB     Assistive Device (Bed Mobility) bed rails; head of bed elevated  -EB     Comment (Bed Mobility) sequencing cues needed  -EB     Row Name 11/15/21 1321          Sit-Stand Transfer    Sit-Stand [place] (Transfers) minimum assist (75% patient effort); 2 person assist  -EB     Assistive Device (Sit-Stand Transfers) walker, front-wheeled  -EB     Row Name 11/15/21 1327          Gait/Stairs (Locomotion)    [place] Level (Gait) minimum assist (75% patient effort); moderate assist (50% patient effort)  -EB     Assistive Device (Gait) walker, front-wheeled  -EB     Distance in Feet (Gait) 40ft  -EB     Deviations/Abnormal Patterns (Gait) stride length decreased; odalys decreased; festinating/shuffling; gait speed decreased  -EB     Bilateral Gait Deviations forward flexed posture; heel strike decreased  -EB     Comment (Gait/Stairs) sequencing cues needed. Also required cues for pt to take bigger steps.  -           User Key  (r) = Recorded By, (t) = Taken By, (c) = Cosigned By    Initials Name Provider Type    EB Renea Olivo PTA Physical Therapy Assistant               Obj/Interventions    No documentation.                Goals/Plan    No documentation.                Clinical Impression     Row Name 11/15/21 6085          Plan of Care Review    Plan of Care Reviewed With patient  -EB     Progress improving  -     Outcome Summary Pt tolerated treatment with no complaints. Pt is improving with mobility but still requires an assist of 1-2. Pt is MinAX2 with bed mobility and with sit<->stand transfers. Pt increased ambulation distance, ambulating 40ft with rwx. Irasema/ModA. Pt required sequencing cues and also for pt to take bigger steps. Pt demos shuffling gait pattern throughout. Will continue to progress pt as tolerated.  -     Row Name  11/15/21 1329          Positioning and Restraints    Pre-Treatment Position in bed  -EB     Post Treatment Position chair  -EB     In Chair reclined; call light within reach; encouraged to call for assist; exit alarm on; with family/caregiver  -EB           User Key  (r) = Recorded By, (t) = Taken By, (c) = Cosigned By    Initials Name Provider Type    Renea Wilde PTA Physical Therapy Assistant               Outcome Measures     Row Name 11/15/21 1331          How much help from another person do you currently need...    Turning from your back to your side while in flat bed without using bedrails? 3  -EB     Moving from lying on back to sitting on the side of a flat bed without bedrails? 3  -EB     Moving to and from a bed to a chair (including a wheelchair)? 3  -EB     Standing up from a chair using your arms (e.g., wheelchair, bedside chair)? 2  -EB     Climbing 3-5 steps with a railing? 2  -EB     To walk in hospital room? 3  -EB     AM-PAC 6 Clicks Score (PT) 16  -EB     Row Name 11/15/21 1331          Modified Erik Scale    Modified [place] Scale 4 - Moderately severe disability.  Unable to walk without assistance, and unable to attend to own bodily needs without assistance.  -EB           User Key  (r) = Recorded By, (t) = Taken By, (c) = Cosigned By    Initials Name Provider Type    Renea Wilde PTA Physical Therapy Assistant                             Physical Therapy Education                 Title: PT OT SLP Therapies (In Progress)     Topic: Physical Therapy (Done)     Point: Mobility training (Done)     Learning Progress Summary           Patient Acceptance, E,D, VU,NR by ARCHANA at 11/15/2021 1332    Acceptance, E, NR by AURELIA at 11/13/2021 1553    Acceptance, E, VU,NR by FREDO at 11/12/2021 1551   Family Acceptance, E, VU,NR by FREDO at 11/12/2021 1551                   Point: Home exercise program (Done)     Learning Progress Summary           Patient Acceptance, E,D, VU,NR by ARCHANA at 11/15/2021 1332                    Point: Body mechanics (Done)     Learning Progress Summary           Patient Acceptance, E,D, VU,NR by EB at 11/15/2021 1332    Acceptance, E, NR by CN at 11/13/2021 1553    Acceptance, E, VU,NR by MW at 11/12/2021 1551   Family Acceptance, E, VU,NR by MW at 11/12/2021 1551                   Point: Precautions (Done)     Learning Progress Summary           Patient Acceptance, E,D, VU,NR by EB at 11/15/2021 1332    Acceptance, E, NR by CN at 11/13/2021 1553    Acceptance, E, VU,NR by MW at 11/12/2021 1551   Family Acceptance, E, VU,NR by MW at 11/12/2021 1551                               User Key     Initials Effective Dates Name Provider Type Discipline     06/16/21 -  Danika Black, PT Physical Therapist PT     06/16/21 -  Renea Olivo PTA Physical Therapy Assistant PT     06/16/21 -  Yahaira Dugan PT Physical Therapist PT              PT Recommendation and Plan     Plan of Care Reviewed With: patient  Progress: improving  Outcome Summary: Pt tolerated treatment with no complaints. Pt is improving with mobility but still requires an assist of 1-2. Pt is MinAX2 with bed mobility and with sit<->stand transfers. Pt increased ambulation distance, ambulating 40ft with rwx. Irasema/ModA. Pt required sequencing cues and also for pt to take bigger steps. Pt demos shuffling gait pattern throughout. Will continue to progress pt as tolerated.     Time Calculation:    PT Charges     Row Name 11/15/21 1251             Time Calculation    Start Time 1114  -EB      Stop Time 1137  -EB      Time Calculation (min) 23 min  -EB      PT Received On 11/15/21  -      PT - Next Appointment 11/16/21  -              Time Calculation- PT    Total Timed Code Minutes- PT 23 minute(s)  -            User Key  (r) = Recorded By, (t) = Taken By, (c) = Cosigned By    Initials Name Provider Type    EB Renea Olivo PTA Physical Therapy Assistant              Therapy Charges for Today     Code Description  Service Date Service Provider Modifiers Qty    80352453534 HC GAIT TRAINING EA 15 MIN 11/15/2021 Renea Olivo, PTA GP 1    77753452690 HC PT THERAPEUTIC ACT EA 15 MIN 11/15/2021 Renea Olivo, PTA GP 1    37517710071 HC PT THER SUPP EA 15 MIN 11/15/2021 Renea Olivo, FIOR GP 1          PT G-Codes  Outcome Measure Options: AM-PAC 6 Clicks Basic Mobility (PT)  AM-PAC 6 Clicks Score (PT): 16  AM-PAC 6 Clicks Score (OT): 14  Modified Erik Scale: 4 - Moderately severe disability.  Unable to walk without assistance, and unable to attend to own bodily needs without assistance.    Renea Olivo PTA  11/15/2021

## 2022-01-14 ENCOUNTER — HOSPITAL ENCOUNTER (OUTPATIENT)
Facility: HOSPITAL | Age: 77
Setting detail: OBSERVATION
Discharge: NURSING, REHAB OR LONG-TERM CARE | End: 2022-01-21
Attending: EMERGENCY MEDICINE | Admitting: NURSE PRACTITIONER
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
## 2022-01-14 DIAGNOSIS — R62.7 FAILURE TO THRIVE IN ADULT: ICD-10-CM

## 2022-01-14 DIAGNOSIS — G20 PARKINSON'S DISEASE: Primary | ICD-10-CM

## 2022-01-14 LAB
ALBUMIN SERPL-MCNC: 4 G/DL (ref 3.5–5.2)
ALBUMIN/GLOB SERPL: 1.9 G/DL
ALP SERPL-CCNC: 99 U/L (ref 39–117)
ALT SERPL W P-5'-P-CCNC: <5 U/L (ref 1–41)
ANION GAP SERPL CALCULATED.3IONS-SCNC: 8 MMOL/L (ref 5–15)
AST SERPL-CCNC: 74 U/L (ref 1–40)
BACTERIA UR QL AUTO: NORMAL /HPF
BASOPHILS # BLD AUTO: 0.02 10*3/MM3 (ref 0–0.2)
BASOPHILS NFR BLD AUTO: 0.2 % (ref 0–1.5)
BILIRUB SERPL-MCNC: 1.1 MG/DL (ref 0–1.2)
BILIRUB UR QL STRIP: NEGATIVE
BUN SERPL-MCNC: 19 MG/DL (ref 8–23)
BUN/CREAT SERPL: 23.5 (ref 7–25)
CALCIUM SPEC-SCNC: 8.6 MG/DL (ref 8.6–10.5)
CHLORIDE SERPL-SCNC: 106 MMOL/L (ref 98–107)
CLARITY UR: CLEAR
CO2 SERPL-SCNC: 29 MMOL/L (ref 22–29)
COLOR UR: ABNORMAL
CREAT SERPL-MCNC: 0.81 MG/DL (ref 0.76–1.27)
DEPRECATED RDW RBC AUTO: 45.6 FL (ref 37–54)
EOSINOPHIL # BLD AUTO: 0.04 10*3/MM3 (ref 0–0.4)
EOSINOPHIL NFR BLD AUTO: 0.4 % (ref 0.3–6.2)
ERYTHROCYTE [DISTWIDTH] IN BLOOD BY AUTOMATED COUNT: 12.8 % (ref 12.3–15.4)
GFR SERPL CREATININE-BSD FRML MDRD: 92 ML/MIN/1.73
GLOBULIN UR ELPH-MCNC: 2.1 GM/DL
GLUCOSE SERPL-MCNC: 101 MG/DL (ref 65–99)
GLUCOSE UR STRIP-MCNC: NEGATIVE MG/DL
HCT VFR BLD AUTO: 38.8 % (ref 37.5–51)
HGB BLD-MCNC: 13.2 G/DL (ref 13–17.7)
HGB UR QL STRIP.AUTO: NEGATIVE
HYALINE CASTS UR QL AUTO: NORMAL /LPF
IMM GRANULOCYTES # BLD AUTO: 0.04 10*3/MM3 (ref 0–0.05)
IMM GRANULOCYTES NFR BLD AUTO: 0.4 % (ref 0–0.5)
KETONES UR QL STRIP: ABNORMAL
LEUKOCYTE ESTERASE UR QL STRIP.AUTO: ABNORMAL
LYMPHOCYTES # BLD AUTO: 1.01 10*3/MM3 (ref 0.7–3.1)
LYMPHOCYTES NFR BLD AUTO: 10.3 % (ref 19.6–45.3)
MCH RBC QN AUTO: 32.9 PG (ref 26.6–33)
MCHC RBC AUTO-ENTMCNC: 34 G/DL (ref 31.5–35.7)
MCV RBC AUTO: 96.8 FL (ref 79–97)
MONOCYTES # BLD AUTO: 0.83 10*3/MM3 (ref 0.1–0.9)
MONOCYTES NFR BLD AUTO: 8.5 % (ref 5–12)
NEUTROPHILS NFR BLD AUTO: 7.87 10*3/MM3 (ref 1.7–7)
NEUTROPHILS NFR BLD AUTO: 80.2 % (ref 42.7–76)
NITRITE UR QL STRIP: NEGATIVE
NRBC BLD AUTO-RTO: 0 /100 WBC (ref 0–0.2)
PH UR STRIP.AUTO: 6.5 [PH] (ref 5–8)
PLATELET # BLD AUTO: 147 10*3/MM3 (ref 140–450)
PMV BLD AUTO: 9.1 FL (ref 6–12)
POTASSIUM SERPL-SCNC: 3.6 MMOL/L (ref 3.5–5.2)
PROT SERPL-MCNC: 6.1 G/DL (ref 6–8.5)
PROT UR QL STRIP: ABNORMAL
RBC # BLD AUTO: 4.01 10*6/MM3 (ref 4.14–5.8)
RBC # UR STRIP: NORMAL /HPF
REF LAB TEST METHOD: NORMAL
SARS-COV-2 RNA PNL SPEC NAA+PROBE: NOT DETECTED
SODIUM SERPL-SCNC: 143 MMOL/L (ref 136–145)
SP GR UR STRIP: 1.02 (ref 1–1.03)
SQUAMOUS #/AREA URNS HPF: NORMAL /HPF
UROBILINOGEN UR QL STRIP: ABNORMAL
WBC # UR STRIP: NORMAL /HPF
WBC NRBC COR # BLD: 9.81 10*3/MM3 (ref 3.4–10.8)

## 2022-01-14 PROCEDURE — 99285 EMERGENCY DEPT VISIT HI MDM: CPT

## 2022-01-14 PROCEDURE — 81001 URINALYSIS AUTO W/SCOPE: CPT | Performed by: EMERGENCY MEDICINE

## 2022-01-14 PROCEDURE — G0378 HOSPITAL OBSERVATION PER HR: HCPCS

## 2022-01-14 PROCEDURE — 80053 COMPREHEN METABOLIC PANEL: CPT | Performed by: EMERGENCY MEDICINE

## 2022-01-14 PROCEDURE — 99284 EMERGENCY DEPT VISIT MOD MDM: CPT

## 2022-01-14 PROCEDURE — C9803 HOPD COVID-19 SPEC COLLECT: HCPCS

## 2022-01-14 PROCEDURE — 85025 COMPLETE CBC W/AUTO DIFF WBC: CPT | Performed by: EMERGENCY MEDICINE

## 2022-01-14 PROCEDURE — 87635 SARS-COV-2 COVID-19 AMP PRB: CPT | Performed by: EMERGENCY MEDICINE

## 2022-01-14 PROCEDURE — P9612 CATHETERIZE FOR URINE SPEC: HCPCS

## 2022-01-14 RX ORDER — RIVASTIGMINE TARTRATE 1.5 MG/1
6 CAPSULE ORAL 2 TIMES DAILY
Status: DISCONTINUED | OUTPATIENT
Start: 2022-01-14 | End: 2022-01-21 | Stop reason: HOSPADM

## 2022-01-14 RX ORDER — MEMANTINE HYDROCHLORIDE 10 MG/1
10 TABLET ORAL EVERY 12 HOURS SCHEDULED
Status: DISCONTINUED | OUTPATIENT
Start: 2022-01-14 | End: 2022-01-21 | Stop reason: HOSPADM

## 2022-01-14 RX ORDER — CHOLECALCIFEROL (VITAMIN D3) 125 MCG
500 CAPSULE ORAL DAILY
Status: DISCONTINUED | OUTPATIENT
Start: 2022-01-14 | End: 2022-01-21 | Stop reason: HOSPADM

## 2022-01-14 RX ORDER — ONDANSETRON 2 MG/ML
4 INJECTION INTRAMUSCULAR; INTRAVENOUS EVERY 6 HOURS PRN
Status: DISCONTINUED | OUTPATIENT
Start: 2022-01-14 | End: 2022-01-21 | Stop reason: HOSPADM

## 2022-01-14 RX ORDER — ASCORBIC ACID 500 MG
500 TABLET ORAL DAILY
Status: DISCONTINUED | OUTPATIENT
Start: 2022-01-14 | End: 2022-01-21 | Stop reason: HOSPADM

## 2022-01-14 RX ORDER — ACETAMINOPHEN 160 MG/5ML
650 SOLUTION ORAL EVERY 4 HOURS PRN
Status: DISCONTINUED | OUTPATIENT
Start: 2022-01-14 | End: 2022-01-21 | Stop reason: HOSPADM

## 2022-01-14 RX ORDER — ATORVASTATIN CALCIUM 20 MG/1
20 TABLET, FILM COATED ORAL DAILY
Status: DISCONTINUED | OUTPATIENT
Start: 2022-01-14 | End: 2022-01-21 | Stop reason: HOSPADM

## 2022-01-14 RX ORDER — SODIUM CHLORIDE 0.9 % (FLUSH) 0.9 %
10 SYRINGE (ML) INJECTION AS NEEDED
Status: DISCONTINUED | OUTPATIENT
Start: 2022-01-14 | End: 2022-01-21 | Stop reason: HOSPADM

## 2022-01-14 RX ORDER — PANTOPRAZOLE SODIUM 40 MG/1
40 TABLET, DELAYED RELEASE ORAL EVERY MORNING
Status: DISCONTINUED | OUTPATIENT
Start: 2022-01-15 | End: 2022-01-21 | Stop reason: HOSPADM

## 2022-01-14 RX ORDER — ACETAMINOPHEN 650 MG/1
650 SUPPOSITORY RECTAL EVERY 4 HOURS PRN
Status: DISCONTINUED | OUTPATIENT
Start: 2022-01-14 | End: 2022-01-21 | Stop reason: HOSPADM

## 2022-01-14 RX ORDER — MEMANTINE HYDROCHLORIDE 10 MG/1
10 TABLET ORAL 2 TIMES DAILY
COMMUNITY

## 2022-01-14 RX ORDER — MULTIPLE VITAMINS W/ MINERALS TAB 9MG-400MCG
1 TAB ORAL DAILY
COMMUNITY

## 2022-01-14 RX ORDER — MELATONIN
1000 DAILY
COMMUNITY

## 2022-01-14 RX ORDER — ACETAMINOPHEN 325 MG/1
650 TABLET ORAL EVERY 4 HOURS PRN
Status: DISCONTINUED | OUTPATIENT
Start: 2022-01-14 | End: 2022-01-21 | Stop reason: HOSPADM

## 2022-01-14 RX ORDER — MELATONIN
1000 DAILY
Status: DISCONTINUED | OUTPATIENT
Start: 2022-01-14 | End: 2022-01-21 | Stop reason: HOSPADM

## 2022-01-14 RX ORDER — MULTIPLE VITAMINS W/ MINERALS TAB 9MG-400MCG
1 TAB ORAL DAILY
Status: DISCONTINUED | OUTPATIENT
Start: 2022-01-14 | End: 2022-01-21 | Stop reason: HOSPADM

## 2022-01-14 RX ORDER — ASPIRIN 81 MG/1
81 TABLET ORAL DAILY
Status: DISCONTINUED | OUTPATIENT
Start: 2022-01-14 | End: 2022-01-17

## 2022-01-14 RX ADMIN — CARBIDOPA AND LEVODOPA 2 TABLET: 25; 100 TABLET ORAL at 09:53

## 2022-01-14 NOTE — CASE MANAGEMENT/SOCIAL WORK
Discharge Planning Assessment  [facility]     Patient Name: Rogelio Sher  MRN: [id number]  Today's Date: 1/14/2022    Admit Date: 1/14/2022     Discharge Needs Assessment    No documentation.                  Discharge Plan       Row Name 01/14/22 1313       Plan    Plan Comments Late entry- 1030- Entered room, introduced self and explained role w/PPE in place on self and daughter ; patient doesnt currently have a mask on; it is daughters feeling that patient can return to [place] as he begins to wake up further; brother is en route to stay with patient; Referrals placed to [facility] for long term care per daughter request; spoke w/Lisa who advises they are unable to provide a bed today but will likely have a bed next week; notified daughter; brother has arrived;     1330- [facility] Letha advised she had arranged for patient to return to [place] per RN request; Entered room to discuss w/son who verbalized great concern with this plan as he has been unable to get patient up or get him to eat much; notified ER provider; Lashawn Kim RN    1400- updated patients daughter  regarding patient status; another referral placed to [facility]- Lashawn Kim RN                      Continued Care and Services - Admitted Since 1/14/2022       Destination       Service Provider Request Status Selected Services Address Phone Fax Patient Preferred    [facility]  Pending - Request Sent N/A [address] [facility] [address] [phone] --                  Selected Continued Care - Prior Encounters Includes selections from prior encounters from 10/16/2021 to 1/14/2022      Discharged on 11/15/2021 Admission date: 11/10/2021 - Discharge disposition: Skilled Nursing Facility (DC - External)      Destination       Service Provider Selected Services Address Phone Fax Patient Preferred    [facility]  Skilled Nursing [address]  [facility] [address] [phone] --                             Demographic Summary    No documentation.                  Functional Status    No documentation.                  Psychosocial    No documentation.                  Abuse/Neglect    No documentation.                  Legal    No documentation.                  Substance Abuse    No documentation.                  Patient Forms    No documentation.                     Lashawn Kim RN

## 2022-01-14 NOTE — ED PROVIDER NOTES
Pt presents to the ED from his memory care unit after being found on the floor by staff this morning.  His daughter is one of the obstetricians on staff here at [facility] and the patient's son is a urologist in [place].  His daughter tells us that the patient falls frequently, usually in the morning before he has been given his Sinemet.  Unfortunately, he has been declining at his memory care facility and is not doing well with his ambulating or feeding himself.     On exam,   Initially, the patient was awake but unable to follow any commands.  His speech is slurred.  He is withdrawn and leans to the left in the bed.  General-well kept, well developed, well nourished  HEENT-normocephalic, atraumatic  Eyes-no scleral icterus  CV-regular rhythm and rate  Lungs-clear to auscultation  Abdomen-soft, nontender  Musculoskeletal-no long bone tenderness or deformities  Neuro-he is awake and alert and will follow simple commands.  His speech is slurred, he is very rigid  Skin-warm and dry     Plan: Initially, the plan was to give him a few doses of his Sinemet and see how he would do.  He did perk up after his second dose, however he is very rigid and is unable to stand or walk without maximal assistance.  He is unable to feed himself or participate in his ADLs.  Family is agreeable to having the patient admitted for further evaluation.      Appropriate PPE was worn, self the patient throughout entire interaction.       Attestation:  The THERON and I have discussed this patient's history, physical exam, and treatment plan.  I have reviewed the documentation and personally had a face to face interaction with the patient. I affirm the documentation and agree with the treatment and plan.  The attached note describes my personal findings.            Jake Bowen MD  01/14/22 [id number]

## 2022-01-14 NOTE — DISCHARGE PLACEMENT REQUEST
Froylan Diggs (76 y.o. Male)             Date of Birth Social Security Number Address Home Phone MRN    1945  [facility]  [address] [address] [phone] [id number]    Gnosticist Marital Status             Roman Catholic        Admission Date Admission Type Admitting Provider Attending Provider Department, Room/Bed    1/14/22 Emergency  Jake Bowen MD [facility] Emergency Department, 26/26    Discharge Date Discharge Disposition Discharge Destination                         Attending Provider: Jake Bowen MD    Allergies: No Known Allergies    Isolation: None   Infection: None   Code Status: Prior   Advance Care Planning Activity    Ht: --   Wt: --    Admission Cmt: None   Principal Problem: None                Active Insurance as of 1/14/2022     Primary Coverage     Payor Plan Insurance Group Employer/Plan Group    MEDICARE MEDICARE A & B      Payor Plan Address Payor Plan Phone Number Payor Plan Fax Number Effective Dates    [address] [phone]  9/1/2009 - None Entered    [facility] [address]       Subscriber Name Subscriber Birth Date Member ID       FROYLAN DIGGS 1945 [id number]           Secondary Coverage     Payor Plan Insurance Group Employer/Plan Group    AARP MC SUP AARP HEALTH CARE OPTIONS      Payor Plan Address Payor Plan Phone Number Payor Plan Fax Number Effective Dates    [facility] [phone]  1/1/2016 - None Entered    [address]       [facility] [id number]       Subscriber Name Subscriber Birth Date Member ID       FROYLAN DIGGS 1945 [id number]                 Emergency Contacts      (Rel.) Home Phone Work Phone Mobile Phone    Susan Ferreira (Daughter) [phone] -- [phone]    Shaheed Diggs (Son) [phone] -- [phone]

## 2022-01-14 NOTE — PLAN OF CARE
Goal Outcome Evaluation:         A&O x 1, rigidity noted, room air, Q 2 turns needed, no appetite, family currently at bedside, nursing will continue to monitor.

## 2022-01-14 NOTE — H&P
HISTORY AND PHYSICAL   [facility]        Date of Admission: 2022  Patient Identification:  Name: Rogelio Sher  Age: 76 y.o.  Sex: male  :  1945  MRN: [id number]                     Primary Care Physician: Provider, No Known    Chief Complaint:  76 year old gentleman who presented to the emergency room after multiple falls; he resides in a memory unit and has a history of parkinsons disease; he has had falls in the past but appears to be getting worse and weaker; his daughter is an ob/gyn at this facility; the patient is not able to contribute to the history    History of Present Illness:   As above    Past Medical History:  Past Medical History:   Diagnosis Date   • Chorioretinitis     histoplasmosis left eye   • GERD (gastroesophageal reflux disease)    • Hyperlipidemia    • Hypertension    • Kidney stone    • Parkinson's disease (HCC)      Past Surgical History:  Past Surgical History:   Procedure Laterality Date   • CATARACT EXTRACTION, BILATERAL     • COLONOSCOPY     • KIDNEY STONE SURGERY     • TONSILLECTOMY        Home Meds:  Medications Prior to Admission   Medication Sig Dispense Refill Last Dose   • aspirin 81 MG EC tablet Take 81 mg by mouth Daily.   2022 at Unknown time   • atorvastatin (LIPITOR) 20 MG tablet Take 20 mg by mouth Daily.   2022 at Unknown time   • carbidopa-levodopa (SINEMET)  MG per tablet Take 1 tablet by mouth Every 6 (Six) Hours. (Patient taking differently: Take 2 tablets by mouth 3 (Three) Times a Day.)   2022 at Unknown time   • cholecalciferol (VITAMIN D3) 25 MCG (1000 UT) tablet Take 1,000 Units by mouth Daily.   2022 at Unknown time   • memantine (NAMENDA) 10 MG tablet Take 10 mg by mouth 2 (Two) Times a Day.   2022 at Unknown time   • multivitamin with minerals (CENTRUM SILVER 50+MEN PO) Take 1 tablet by mouth Daily.   2022 at Unknown time   • omeprazole (priLOSEC) 20 MG capsule Take 20 mg by mouth Daily.    "2022 at Unknown time   • pramipexole (MIRAPEX) 0.75 MG tablet Take 0.7 mg by mouth 3 (Three) Times a Day.   2022 at Unknown time   • rivastigmine (EXELON) 6 MG capsule Take 6 mg by mouth 2 (Two) Times a Day.   2022 at Unknown time   • vitamin B-12 (CYANOCOBALAMIN) 500 MCG tablet Take 500 mcg by mouth Daily.   2022 at Unknown time   • vitamin C (ASCORBIC ACID) 500 MG tablet Take 500 mg by mouth Daily.   2022 at Unknown time       Allergies:  No Known Allergies  Immunizations:  Immunization History   Administered Date(s) Administered   • COVID-19 (PFIZER) 2021, 2021, 2021     Social History:   Social History     Social History Narrative   • Not on file     Social History     Socioeconomic History   • Marital status:    Tobacco Use   • Smoking status: Never Smoker   • Smokeless tobacco: Never Used   Substance and Sexual Activity   • Alcohol use: Yes     Comment: 3 drinks weekly   • Drug use: No       Family History:  History reviewed. No pertinent family history.     Review of Systems   not obtainable from the patient  Objective:  T Max 24 hrs: Temp (24hrs), Av.7 °F (36.5 °C), Min:97.1 °F (36.2 °C), Max:98.2 °F (36.8 °C)    Vitals Ranges:   Temp:  [97.1 °F (36.2 °C)-98.2 °F (36.8 °C)] 98.2 °F (36.8 °C)  Heart Rate:  [60-79] 70  Resp:  [16-20] 20  BP: (126-189)/() 126/73      Exam:  /73 (BP Location: Right arm, Patient Position: Lying)   Pulse 70   Temp 98.2 °F (36.8 °C) (Axillary)   Resp 20   Ht 182.9 cm (72\")   Wt 92.9 kg (204 lb 12.8 oz)   SpO2 94%   BMI 27.78 kg/m²     General Appearance:    Drowsy chronically ill appearing,cooperative,  appears stated age   Head:    Normocephalic, without obvious abnormality, atraumatic   Eyes:    PERRL, conjunctivae/corneas clear, EOM's intact, both eyes   Ears:    Normal external ear canals, both ears   Nose:   Nares normal, septum midline, mucosa normal, no drainage    or sinus tenderness   Throat:   Lips, " mucosa, and tongue normal   Neck:   Supple, symmetrical, trachea midline, no adenopathy;     thyroid:  no enlargement/tenderness/nodules; no carotid    bruit or JVD   Back:     Symmetric, no curvature, ROM normal, no CVA tenderness   Lungs:     Decreased breath sounds bilaterally, respirations unlabored   Chest Wall:    No tenderness or deformity    Heart:    Regular rate and rhythm, S1 and S2 normal, no murmur, rub   or gallop   Abdomen:     Soft, nontender, bowel sounds active all four quadrants,     no masses, no hepatomegaly, no splenomegaly   Extremities:   Extremities normal, atraumatic, no cyanosis or edema   Pulses:   2+ and symmetric all extremities   Skin:   Skin color, texture, turgor normal, no rashes or lesions   Lymph nodes:   Cervical, supraclavicular, and axillary nodes normal   Neurologic:   CNII-XII intact, normal strength, sensation intact throughout      .    Data Review:  Labs in chart were reviewed.  WBC   Date Value Ref Range Status   01/14/2022 9.81 3.40 - 10.80 10*3/mm3 Final     Hemoglobin   Date Value Ref Range Status   01/14/2022 13.2 13.0 - 17.7 g/dL Final     Hematocrit   Date Value Ref Range Status   01/14/2022 38.8 37.5 - 51.0 % Final     Platelets   Date Value Ref Range Status   01/14/2022 147 140 - 450 10*3/mm3 Final     Sodium   Date Value Ref Range Status   01/14/2022 143 136 - 145 mmol/L Final     Potassium   Date Value Ref Range Status   01/14/2022 3.6 3.5 - 5.2 mmol/L Final     Chloride   Date Value Ref Range Status   01/14/2022 106 98 - 107 mmol/L Final     CO2   Date Value Ref Range Status   01/14/2022 29.0 22.0 - 29.0 mmol/L Final     BUN   Date Value Ref Range Status   01/14/2022 19 8 - 23 mg/dL Final     Creatinine   Date Value Ref Range Status   01/14/2022 0.81 0.76 - 1.27 mg/dL Final     Glucose   Date Value Ref Range Status   01/14/2022 101 (H) 65 - 99 mg/dL Final     Calcium   Date Value Ref Range Status   01/14/2022 8.6 8.6 - 10.5 mg/dL Final     AST (SGOT)   Date  Value Ref Range Status   01/14/2022 74 (H) 1 - 40 U/L Final     ALT (SGPT)   Date Value Ref Range Status   01/14/2022 <5 1 - 41 U/L Final     Alkaline Phosphatase   Date Value Ref Range Status   01/14/2022 99 39 - 117 U/L Final                Imaging Results (All)     None        Patient Active Problem List   Diagnosis Code   • Generalized weakness R53.1   • Parkinson's disease (HCC) G20   • Essential hypertension I10   • Influenza A J10.1       Assessment:    Parkinson's disease (HCC)  anemia  Falls  Dementia  hypertension    Plan:  Pt/ot consults  Will need a higher level of care  Monitor bp and blood sugar  Continue parkinsons medications  D.w ED provider, nurse and patient    Ibis Woo MD  1/14/2022  18:43 EST

## 2022-01-14 NOTE — ED NOTES
Patient's brief and linens changed.  Patient had one two Depends and both briefs were full of urine.  Patient's body is very still and patient is unable to assist or follow commands currently.  Son at bedside stated he had given him one Sinemet at 10 and another at 12 and has not seen much improvement cognitively. Provider notified.     Tara Navas RN  01/14/22 6381

## 2022-01-14 NOTE — DISCHARGE PLACEMENT REQUEST
Froylan Diggs (76 y.o. Male)             Date of Birth Social Security Number Address Home Phone MRN    1945  [facility]  [address] [address] [phone] [id number]    Taoism Marital Status             Rastafari        Admission Date Admission Type Admitting Provider Attending Provider Department, Room/Bed    1/14/22 Emergency  Jake Bowen MD [facility] Emergency Department, 26/26    Discharge Date Discharge Disposition Discharge Destination                         Attending Provider: Jake Bowen MD    Allergies: No Known Allergies    Isolation: None   Infection: None   Code Status: Prior   Advance Care Planning Activity    Ht: --   Wt: --    Admission Cmt: None   Principal Problem: None                Active Insurance as of 1/14/2022     Primary Coverage     Payor Plan Insurance Group Employer/Plan Group    MEDICARE MEDICARE A & B      Payor Plan Address Payor Plan Phone Number Payor Plan Fax Number Effective Dates    [address] [phone]  9/1/2009 - None Entered    [facility] [address]       Subscriber Name Subscriber Birth Date Member ID       FROYLAN DIGGS 1945 [id number]           Secondary Coverage     Payor Plan Insurance Group Employer/Plan Group    AARP MC SUP AARP HEALTH CARE OPTIONS      Payor Plan Address Payor Plan Phone Number Payor Plan Fax Number Effective Dates    [facility] [phone]  1/1/2016 - None Entered    [address]       [facility] [id number]       Subscriber Name Subscriber Birth Date Member ID       FROYLAN DIGGS 1945 [id number]                 Emergency Contacts      (Rel.) Home Phone Work Phone Mobile Phone    Susan Ferreira (Daughter) [phone] -- [phone]    Shaheed Diggs (Son) [phone] -- [phone]

## 2022-01-14 NOTE — ED PROVIDER NOTES
EMERGENCY DEPARTMENT ENCOUNTER    Room Number:  26/26  Date of encounter:  1/14/2022  PCP: Provider, No Known  Historian: Patient's daughter      PPE    Patient was placed in face mask in first look. Patient was wearing facemask when I entered the room and throughout our encounter. I wore full protective equipment throughout this patient encounter including a N95 face mask, and gloves. Hand hygiene was performed before donning protective equipment and after removal when leaving the room.        HPI:  Chief Complaint: Fall  A complete HPI/ROS/PMH/PSH/SH/FH are unobtainable due to: Patient has history of Parkinson's    Context: Rogelio Sher is a 76 y.o. male who arrives to the ED via EMS from [facility].  Patient's daughter states that she received a call stating that he had fallen this morning and they were bringing him to the ER.  She states that he falls frequently due to his Parkinson's disease.  She states that usually in the morning before he has been given his medication.  She states that he is acting like his normal self and feels like he is Sinemet.  She also states that she feels patient probably is going to need to be moved to a higher level of care.  She states that unless we find an abnormality on his exam she does not feel a big work-up is necessary at this time.  Due to patient's history of Parkinson's he is unable to provide details of the HPI or review of symptoms.      PAST MEDICAL HISTORY  Active Ambulatory Problems     Diagnosis Date Noted   • Generalized weakness 02/01/2018   • Parkinson's disease (HCC) 02/02/2018   • Essential hypertension 02/02/2018   • Influenza A 02/02/2018     Resolved Ambulatory Problems     Diagnosis Date Noted   • Altered mental status 11/10/2021   • Metabolic encephalopathy 11/10/2021     Past Medical History:   Diagnosis Date   • Chorioretinitis    • GERD (gastroesophageal reflux disease)    • Hyperlipidemia    • Hypertension    • Kidney stone           PAST SURGICAL HISTORY  Past Surgical History:   Procedure Laterality Date   • CATARACT EXTRACTION, BILATERAL     • COLONOSCOPY     • KIDNEY STONE SURGERY     • TONSILLECTOMY           FAMILY HISTORY  No family history on file.      SOCIAL HISTORY  Social History     Socioeconomic History   • Marital status:    Tobacco Use   • Smoking status: Never Smoker   • Smokeless tobacco: Never Used   Substance and Sexual Activity   • Alcohol use: Yes     Comment: 3 drinks weekly   • Drug use: No         ALLERGIES  Patient has no known allergies.        REVIEW OF SYSTEMS  Review of Systems     Unobtainable due to history of Parkinson's       PHYSICAL EXAM    ED Triage Vitals   Temp Heart Rate Resp BP SpO2   01/14/22 0840 01/14/22 0821 01/14/22 0821 01/14/22 0821 01/14/22 0821   97.1 °F (36.2 °C) 60 18 145/100 98 %       Physical Exam  GENERAL: Well appearing, nontoxic appearing, not distressed  HENT: normocephalic, atraumatic  EYES: no scleral icterus, PERRL  CV: regular rhythm, regular rate, no murmur  RESPIRATORY: normal effort, CTAB  ABDOMEN: soft   MUSCULOSKELETAL: No reproducible tenderness to the C-spine, T-spine or L-spine  NEURO: alert, moves all extremities, follows a few simple commands, mental status normal/baseline  Unable to perform full neuro exam due to patient's Parkinson's  SKIN: warm, dry, no rash   Psych: Patient attempts to answer questions however it is mumbled  Nursing notes and vital signs reviewed      LAB RESULTS  No results found for this or any previous visit (from the past 24 hour(s)).    Ordered the above labs and independently reviewed the results.      RADIOLOGY  No Radiology Exams Resulted Within Past 24 Hours    I ordered the above noted radiological studies and viewed the images on the PACS system.         MEDICAL RECORD REVIEW  Medical records reviewed in epic, patient was last admitted in November 2021 for Parkinson's disease.  He had an MRI of his brain during that visit with  the following impression:  IMPRESSION:  1.  Mild changes of bilateral small vessel white matter ischemic  disease.  2.  No evidence of acute infarction or hemorrhage.  3. There is a 1.4 cm T2 bright signal area in the right mastoid air  cells which demonstrates slight restricted diffusion on  diffusion-weighted images. This could represent chronic mastoid fluid.  Please correlate for signs of mastoiditis. Another possibility might be  cholesteatoma. If further evaluation, short-term follow-up CT scanning  of the brain or temporal bones may be helpful to assess stability.    PROCEDURES    Procedures        DIFFERENTIAL DIAGNOSIS  Differential diagnosis include but are not limited to the following: Parkinson's disease, frequent falls, intracranial abnormality      PROGRESS, DATA ANALYSIS, CONSULTS, AND MEDICAL DECISION MAKING        ED Course as of 01/14/22 1548   Fri Jan 14, 2022   0951 Discussed with patient's daughter, she would like for patient to get his morning dose of Sinemet, she does not feel he needs a work-up at this time she states he is at his baseline.  She would like to talk to [facility] regarding higher level of care placement.  I did discuss this with MARGOT Monae RN who will speak with patient's daughter. [MS]   0952 Reviewed pt's history and workup with Dr. Bowen.  After a bedside evaluation, they agree with the plan of care.       [MS]   1048 Patient is is more awake and alert after receiving his Sinemet.  Daughter is speaking with Lashawn Martinez CCP RN at this time.  She states that her and her brother will be able to take patient back to the facility. [MS]   1508 CBC and urinalysis are unremarkable. [WC]   1527 CMP unremarkable. [WC]   1531 COVID19: Not Detected [WC]   1542 Discussed with Dr. Woo ([facility])-she agrees to admit the patient to the hospital. [WC]      ED Course User Index  [MS] Ines Fitch, APRN  [WC] Jake Bowen MD      Diagnosis Plan   1. Parkinson's disease (HCC)     2.  Fall, initial encounter     3. Failure to thrive in adult             MEDICATIONS GIVEN IN ED    Medications   carbidopa-levodopa (SINEMET)  MG per tablet 2 tablet (has no administration in time range)           COURSE & MEDICAL DECISION MAKING  Any/All labs and Any/All Imaging studies that were ordered were reviewed and are noted above.  Results were reviewed/discussed with the patient and they were also made aware of online access.    Pt also made aware that some labs, such as cultures, will not be resulted during ER visit and followup with PMD is necessary.        Ines Fitch, APRN  01/14/22 1548

## 2022-01-14 NOTE — ED NOTES
Pt to ED from [place] s/p found down this am, pt has hx of parkinsons, falls frequently, daughter at bedside. Helped pt to take his sinemet since he did not get his dose at the facility this am. Pt has not been c/o of pain per daughter.     Pt wearing face mask during their stay in ER. This RN wore capr and gloves while providing patient care.        Didi Rai RN  01/14/22 9575

## 2022-01-14 NOTE — CASE MANAGEMENT/SOCIAL WORK
Discharge Planning Assessment  [facility]     Patient Name: Rogelio Sher  MRN: [id number]  Today's Date: 1/14/2022    Admit Date: 1/14/2022     Discharge Needs Assessment     Row Name 01/14/22 0938       Living Environment    Unique Family Situation [facility]    Primary Care Provided by --  facility staff    Provides Primary Care For no one, unable/limited ability to care for self    Quality of Family Relationships supportive    Living Arrangement Comments Call out to [facility] re ? higher level of care       Transition Planning    Transportation Anticipated family or friend will provide       Discharge Needs Assessment    Equipment Currently Used at Home none    Concerns to be Addressed discharge planning    Provided Post Acute Provider List? Yes    Post Acute Provider List Nursing Home    Delivered To Support Person    Support Person daughter Dr. Susan Ferreira    Method of Delivery In person               Discharge Plan     Row Name 01/14/22 0940       Plan    Plan Spoke w/ daughter per request from APRN. Introduced self and explained role of CCP. PPE used    Provided Post Acute Provider List? Yes    Post Acute Provider List Nursing Home    Delivered To Support Person    Support Person daughter Susan    Method of Delivery In person    Plan Comments Daughter concerned that, at some point, patient may need higher level of care. Currentyl in [facility]. Awaiting call back from facility re ?higher level of care. Getting PT 3-4 times/week, and [facility] assists hiim in the am. Daughter given [facility] and expllained. will follow              Continued Care and Services - Admitted Since 1/14/2022    Coordination has not been started for this encounter.     Selected Continued Care - Prior Encounters Includes selections from prior encounters from 10/16/2021 to 1/14/2022    Discharged on 11/15/2021 Admission date: 11/10/2021 - Discharge disposition:  Skilled Nursing Facility (DC - External)    Destination     Service Provider Selected Services Address Phone Fax Patient Preferred    [facility]  Skilled Nursing [address] [place] [id number] [phone] --                       Demographic Summary    No documentation.                Functional Status    No documentation.                Psychosocial    No documentation.                Abuse/Neglect    No documentation.                Legal    No documentation.                Substance Abuse    No documentation.                Patient Forms    No documentation.                   Thalia Raya RN

## 2022-01-14 NOTE — ED NOTES
Patient from [place] via EMS; patient had an unwitnessed fall with an unknown down time. Patient denies any pain and is at baseline per facility; A&Ox1.     Annmarie Schultz RN  01/14/22 9374

## 2022-01-14 NOTE — PROGRESS NOTES
Clinical Pharmacy Services: Medication History    Rogelio Sher is a 76 y.o. male presenting to [facility] for   Chief Complaint   Patient presents with   • Fall       He  has a past medical history of Chorioretinitis, GERD (gastroesophageal reflux disease), Hyperlipidemia, Hypertension, Kidney stone, and Parkinson's disease (HCC).    Allergies as of 01/14/2022   • (No Known Allergies)       Medication information was obtained from: Nursing Home   Pharmacy and Phone Number:     Prior to Admission Medications     Prescriptions Last Dose Informant Patient Reported? Taking?    aspirin 81 MG EC tablet  Nursing Home Yes Yes    Take 81 mg by mouth Daily.    atorvastatin (LIPITOR) 20 MG tablet  Nursing Home Yes Yes    Take 20 mg by mouth Daily.    carbidopa-levodopa (SINEMET)  MG per tablet  Nursing Home No Yes    Take 1 tablet by mouth Every 6 (Six) Hours.    Patient taking differently:  Take 2 tablets by mouth 3 (Three) Times a Day.    cholecalciferol (VITAMIN D3) 25 MCG (1000 UT) tablet  Nursing Home Yes Yes    Take 1,000 Units by mouth Daily.    memantine (NAMENDA) 10 MG tablet  Nursing Home Yes Yes    Take 10 mg by mouth 2 (Two) Times a Day.    multivitamin with minerals (CENTRUM SILVER 50+MEN PO)  Nursing Home Yes Yes    Take 1 tablet by mouth Daily.    omeprazole (priLOSEC) 20 MG capsule  Nursing Home Yes Yes    Take 20 mg by mouth Daily.    pramipexole (MIRAPEX) 0.75 MG tablet  Nursing Home Yes Yes    Take 0.7 mg by mouth 3 (Three) Times a Day.    rivastigmine (EXELON) 6 MG capsule  Self Yes Yes    Take 6 mg by mouth 2 (Two) Times a Day.    vitamin B-12 (CYANOCOBALAMIN) 500 MCG tablet  Nursing Home Yes Yes    Take 500 mcg by mouth Daily.    vitamin C (ASCORBIC ACID) 500 MG tablet  Nursing Home Yes Yes    Take 500 mg by mouth Daily.            Medication notes:     This medication list is complete to the best of my knowledge as of 1/14/2022    Please call if questions.  Eliu  Lisa  Medication History Technician  [phone]    1/14/2022 11:56 EST

## 2022-01-15 LAB
ANION GAP SERPL CALCULATED.3IONS-SCNC: 10.5 MMOL/L (ref 5–15)
BASOPHILS # BLD AUTO: 0.03 10*3/MM3 (ref 0–0.2)
BASOPHILS NFR BLD AUTO: 0.4 % (ref 0–1.5)
BUN SERPL-MCNC: 14 MG/DL (ref 8–23)
BUN/CREAT SERPL: 17.7 (ref 7–25)
CALCIUM SPEC-SCNC: 8.3 MG/DL (ref 8.6–10.5)
CHLORIDE SERPL-SCNC: 110 MMOL/L (ref 98–107)
CO2 SERPL-SCNC: 23.5 MMOL/L (ref 22–29)
CREAT SERPL-MCNC: 0.79 MG/DL (ref 0.76–1.27)
DEPRECATED RDW RBC AUTO: 45.1 FL (ref 37–54)
EOSINOPHIL # BLD AUTO: 0.12 10*3/MM3 (ref 0–0.4)
EOSINOPHIL NFR BLD AUTO: 1.7 % (ref 0.3–6.2)
ERYTHROCYTE [DISTWIDTH] IN BLOOD BY AUTOMATED COUNT: 12.6 % (ref 12.3–15.4)
GFR SERPL CREATININE-BSD FRML MDRD: 95 ML/MIN/1.73
GLUCOSE SERPL-MCNC: 86 MG/DL (ref 65–99)
HCT VFR BLD AUTO: 40.8 % (ref 37.5–51)
HGB BLD-MCNC: 13.6 G/DL (ref 13–17.7)
IMM GRANULOCYTES # BLD AUTO: 0.03 10*3/MM3 (ref 0–0.05)
IMM GRANULOCYTES NFR BLD AUTO: 0.4 % (ref 0–0.5)
LYMPHOCYTES # BLD AUTO: 1.41 10*3/MM3 (ref 0.7–3.1)
LYMPHOCYTES NFR BLD AUTO: 20.3 % (ref 19.6–45.3)
MCH RBC QN AUTO: 32.8 PG (ref 26.6–33)
MCHC RBC AUTO-ENTMCNC: 33.3 G/DL (ref 31.5–35.7)
MCV RBC AUTO: 98.3 FL (ref 79–97)
MONOCYTES # BLD AUTO: 0.55 10*3/MM3 (ref 0.1–0.9)
MONOCYTES NFR BLD AUTO: 7.9 % (ref 5–12)
NEUTROPHILS NFR BLD AUTO: 4.79 10*3/MM3 (ref 1.7–7)
NEUTROPHILS NFR BLD AUTO: 69.3 % (ref 42.7–76)
NRBC BLD AUTO-RTO: 0.1 /100 WBC (ref 0–0.2)
PLATELET # BLD AUTO: 159 10*3/MM3 (ref 140–450)
PMV BLD AUTO: 9.4 FL (ref 6–12)
POTASSIUM SERPL-SCNC: 3.5 MMOL/L (ref 3.5–5.2)
RBC # BLD AUTO: 4.15 10*6/MM3 (ref 4.14–5.8)
SODIUM SERPL-SCNC: 144 MMOL/L (ref 136–145)
WBC NRBC COR # BLD: 6.93 10*3/MM3 (ref 3.4–10.8)

## 2022-01-15 PROCEDURE — G0378 HOSPITAL OBSERVATION PER HR: HCPCS

## 2022-01-15 PROCEDURE — 80048 BASIC METABOLIC PNL TOTAL CA: CPT | Performed by: INTERNAL MEDICINE

## 2022-01-15 PROCEDURE — 85025 COMPLETE CBC W/AUTO DIFF WBC: CPT | Performed by: INTERNAL MEDICINE

## 2022-01-15 PROCEDURE — 97162 PT EVAL MOD COMPLEX 30 MIN: CPT

## 2022-01-15 RX ADMIN — CARBIDOPA AND LEVODOPA 2 TABLET: 25; 100 TABLET ORAL at 17:04

## 2022-01-15 RX ADMIN — PRAMIPEXOLE DIHYDROCHLORIDE 0.75 MG: 0.5 TABLET ORAL at 09:46

## 2022-01-15 RX ADMIN — RIVASTIGMINE TARTRATE 6 MG: 1.5 CAPSULE ORAL at 20:04

## 2022-01-15 RX ADMIN — CARBIDOPA AND LEVODOPA 2 TABLET: 25; 100 TABLET ORAL at 20:03

## 2022-01-15 RX ADMIN — CARBIDOPA AND LEVODOPA 2 TABLET: 25; 100 TABLET ORAL at 12:09

## 2022-01-15 RX ADMIN — MEMANTINE HYDROCHLORIDE 10 MG: 10 TABLET, FILM COATED ORAL at 20:04

## 2022-01-15 RX ADMIN — RIVASTIGMINE TARTRATE 6 MG: 1.5 CAPSULE ORAL at 09:46

## 2022-01-15 RX ADMIN — ATORVASTATIN CALCIUM 20 MG: 20 TABLET, FILM COATED ORAL at 09:46

## 2022-01-15 RX ADMIN — PRAMIPEXOLE DIHYDROCHLORIDE 0.75 MG: 0.5 TABLET ORAL at 15:52

## 2022-01-15 RX ADMIN — ASPIRIN 81 MG: 81 TABLET, COATED ORAL at 09:46

## 2022-01-15 RX ADMIN — MEMANTINE HYDROCHLORIDE 10 MG: 10 TABLET, FILM COATED ORAL at 09:47

## 2022-01-15 RX ADMIN — PRAMIPEXOLE DIHYDROCHLORIDE 0.75 MG: 0.5 TABLET ORAL at 20:05

## 2022-01-15 RX ADMIN — CARBIDOPA AND LEVODOPA 2 TABLET: 25; 100 TABLET ORAL at 09:46

## 2022-01-15 NOTE — NURSING NOTE
Patient is very  Resistive to care. He attempted to hit kick and pinch us as we attempted to change his brief. Patient also refused all medication. Will continue to monitor

## 2022-01-15 NOTE — PLAN OF CARE
Goal Outcome Evaluation:  Plan of Care Reviewed With: patient        Progress: no change   Patient is alert to self only. He is very resistive to care.He gets very agitated and combative when he is woke up.  Patient positions himself well in the bed. He refused to take any medication this shift. Will continue to monitor

## 2022-01-15 NOTE — PLAN OF CARE
Goal Outcome Evaluation:  Plan of Care Reviewed With: patient        Progress: improving  Outcome Summary: Patient more alert and cooperative this shift. Meds crushed in applesauce. Worked with PT, attempted to stand at bedside. Turn q 2 hours. Purewick in place. Total feed. Skin tears and bruising to extremities. Rested comfortably throughout the day. Family at bedside. Will continue to monitor.

## 2022-01-15 NOTE — PROGRESS NOTES
Name: Rogelio Sher ADMIT: 2022   : 1945  PCP: Provider, No Known    MRN: [id number] LOS: 0 days   AGE/SEX: 76 y.o. male  ROOM: [facility]/1   Subjective   Chief Complaint   Patient presents with   • Fall     H/o parkinsons  On sinemet as outpatient  Has missed a few doses  +falls      ROS  Unreliable due to pts condition    Objective   Vital Signs  Temp:  [97.2 °F (36.2 °C)-98.7 °F (37.1 °C)] 97.2 °F (36.2 °C)  Heart Rate:  [60-76] 63  Resp:  [14-20] 16  BP: ()/(58-86) 91/58  SpO2:  [94 %-97 %] 96 %  on   ;   Device (Oxygen Therapy): room air  Body mass index is 27.78 kg/m².    Physical Exam  Constitutional:       General: He is in acute distress.      Appearance: He is ill-appearing (acutely and chronically).   Cardiovascular:      Rate and Rhythm: Normal rate and regular rhythm.   Pulmonary:      Effort: No respiratory distress.      Breath sounds: No wheezing.   Abdominal:      General: There is no distension.      Palpations: Abdomen is soft.      Tenderness: There is no abdominal tenderness.   Musculoskeletal:         General: Signs of injury (bruising on multiple extremities. No evidence for any fractures or pain with movement) present. No deformity.     He is very drowsy and difficult to arouse    Results Review:       I reviewed the patient's new clinical results.  Results from last 7 days   Lab Units 01/15/22  0815 22  1433   WBC 10*3/mm3 6.93 9.81   HEMOGLOBIN g/dL 13.6 13.2   PLATELETS 10*3/mm3 159 147     Results from last 7 days   Lab Units 01/15/22  0815 22  1433   SODIUM mmol/L 144 143   POTASSIUM mmol/L 3.5 3.6   CHLORIDE mmol/L 110* 106   CO2 mmol/L 23.5 29.0   BUN mg/dL 14 19   CREATININE mg/dL 0.79 0.81   GLUCOSE mg/dL 86 101*   Estimated Creatinine Clearance: 101.6 mL/min (by C-G formula based on SCr of 0.79 mg/dL).  Results from last 7 days   Lab Units 22  1433   ALBUMIN g/dL 4.00   BILIRUBIN mg/dL 1.1   ALK PHOS U/L 99   AST (SGOT) U/L 74*   ALT (SGPT) U/L  <5     Results from last 7 days   Lab Units 01/15/22  0815 01/14/22  1433   CALCIUM mg/dL 8.3* 8.6   ALBUMIN g/dL  --  4.00         Coag     HbA1C No results found for: HGBA1C  Infection     Radiology(recent) No radiology results for the last day  No results found for: TROPONINT, TROPONINI, BNP  No components found for: TSH;2    ascorbic acid, 500 mg, Oral, Daily  aspirin, 81 mg, Oral, Daily  atorvastatin, 20 mg, Oral, Daily  carbidopa-levodopa, 2 tablet, Oral, 4x Daily  cholecalciferol, 1,000 Units, Oral, Daily  memantine, 10 mg, Oral, Q12H  multivitamin with minerals, 1 tablet, Oral, Daily  pantoprazole, 40 mg, Oral, QAM  pramipexole, 0.75 mg, Oral, TID  rivastigmine, 6 mg, Oral, BID  vitamin B-12, 500 mcg, Oral, Daily       Diet Regular; Cardiac      Assessment/Plan      Active Hospital Problems    Diagnosis  POA   • **Parkinson's disease (HCC) [G20]  Yes   • GERD (gastroesophageal reflux disease) [K21.9]  Yes   • DNR (do not resuscitate) [Z66]  Yes   • Essential hypertension [I10]  Yes   • Generalized weakness [R53.1]  Yes      Resolved Hospital Problems   No resolved problems to display.       · Restarted agents for his Parkinson's. I think missing a few doses has made him more withdrawn and less active.  He has had falls but no evidence for any fracture or acute injury besides some bruising.  Also no evidence for infection or any significant electrolyte abnormalities.  Discussed the case with his physician son at bedside.  He also feels that this is mostly a progression of the patient's Parkinson's disease and that he has become more debilitated and that things are worse with missing a few doses of his Parkinson's medication.  · PPI, ASA  · PT, OT  · Labs reviewed  · Discussed code status- DNR/DNI per family. Son confirmed with his sister.    I also reviewed notes from documentation of his admission a couple of months ago.  At that time he did have MRI of the brain to rule out acute stroke which was negative.   They did have neurology see who had increased his Sinemet to 4 times a day but that was about it.  Discussed with patient's son at bedside who does not feel additional neurology consultation would be of benefit at this time, and I do agree.      DW RN  DW family  Greater than 36 minutes spent with greater than 50% counseling and coordinating care      Jose Toscano MD  [facility]  01/15/22  14:29 EST

## 2022-01-15 NOTE — CASE MANAGEMENT/SOCIAL WORK
Continued Stay Note  [facility]     Patient Name: Rogelio Sher  MRN: [id number]  Today's Date: 1/15/2022    Admit Date: 1/14/2022     Discharge Plan     Row Name 01/15/22 1344       Plan    Plan Referral pending to [facility]    Patient/Family in Agreement with Plan yes    Plan Comments CCP received inbound call from Dr. Toscano requesting update on discharge planning process. Notified Dr. Toscano patient has not been evaluated by PT yet and this will be necessary prior to SNF being able to accept or determination if patient can return to [facility]. Noted referrals pending to Lisa/Patsy for [facility] and [facility]. Lisa states she is following referral for bed availability at [facility] for next week as she does not have any weekend beds. She states cannot accept at [facility] or [facility] because patient is from [facility] and those facilities cannot accommodate. CCP spoke with Mendel/Farheen Austin to inquire about return requirements and he states patient has to be able to ambulate independently to return to [facility] care or will need 24 hour sitters to return. Mendel can be reached at [phone] once PT notes is in to discuss mobility and return. Noted patient was admitted for increasing weakness and falls. Spoke with Mulu/BONNY who states patient has not been out of bed since admit but that PT plans to evaluate patient today. [facility] staff to follow up with Farheen Austin and family for dc planning once PT evaluation is in. Updated Dr. Toscano and RN. Sandra Regalado RN/CCP               Discharge Codes    No documentation.                     Lisa Regalado

## 2022-01-15 NOTE — PLAN OF CARE
Goal Outcome Evaluation:  Plan of Care Reviewed With: patient           Outcome Summary: Pt. Is a 75 y/o male admitted from [facility] care unit after suffering a fall. Pt. Is A&Ox2 though difficult to understand at times due to mumbled speech. PMHx includes Parkinsons, Hyperlipedemia, HTN, and kidney stones. Pt. Required maxAx2 for bed mobility, though able to intermittently correct posture and follow commands. Pt. States he walks with rwx at baseline, attempted standing though pt. Remained very forward flexed/crouched therefore returned to bed. Anticipate pt. To return to [facility] at D/C.    Patient was wearing a face mask during this therapy encounter. Therapist used appropriate personal protective equipment including eye protection, mask, and gloves.  Mask used was standard procedure mask. Appropriate PPE was worn during the entire therapy session. Hand hygiene was completed before and after therapy session. Patient is not in enhanced droplet precautions.

## 2022-01-15 NOTE — THERAPY EVALUATION
Patient Name: Rogelio Sher  : 1945    MRN: [id number]                              Today's Date: 1/15/2022       Admit Date: 2022    Visit Dx:     ICD-10-CM ICD-9-CM   1. Parkinson's disease (HCC)  G20 332.0   2. Fall, initial encounter  W19.XXXA E888.9   3. Failure to thrive in adult  R62.7 783.7     Patient Active Problem List   Diagnosis   • Generalized weakness   • Parkinson's disease (HCC)   • Essential hypertension   • Influenza A   • GERD (gastroesophageal reflux disease)   • DNR (do not resuscitate)     Past Medical History:   Diagnosis Date   • Chorioretinitis     histoplasmosis left eye   • GERD (gastroesophageal reflux disease)    • Hyperlipidemia    • Hypertension    • Kidney stone    • Parkinson's disease (HCC)      Past Surgical History:   Procedure Laterality Date   • CATARACT EXTRACTION, BILATERAL     • COLONOSCOPY     • KIDNEY STONE SURGERY     • TONSILLECTOMY        General Information     [facility] Name 01/15/22 161          Physical Therapy Time and Intention    Document Type evaluation  -     Mode of Treatment physical therapy; individual therapy  -     Row Name 01/15/22 9739          General Information    Patient Profile Reviewed yes  -     Prior Level of Function --  unsure PLOF, pt. reports he walks at baseline  -     Existing Precautions/Restrictions fall  -     Barriers to Rehab medically complex; previous functional deficit  -     Row Name 01/15/22 2500          Living Environment    Lives With facility resident  -     Row Name 01/15/22 1616          Stairs Within Home, Primary    Number of Stairs, Within Home, Primary --  -     Row Name 01/15/22 1613          Cognition    Orientation Status (Cognition) oriented to; person; place  -     Row Name 01/15/22 4374          Safety Issues, Functional Mobility    Safety Issues Affecting Function (Mobility) awareness of need for assistance; impulsivity; safety precaution awareness  -     Impairments Affecting  Function (Mobility) balance; endurance/activity tolerance; strength; pain; postural/trunk control  -           User Key  (r) = Recorded By, (t) = Taken By, (c) = Cosigned By    Initials Name Provider Type     Radha Beckford PT Physical Therapist               Mobility     Row Name 01/15/22 1615          Bed Mobility    Bed Mobility sit-supine; supine-sit  -     Supine-Sit [place] (Bed Mobility) maximum assist (25% patient effort); 2 person assist; verbal cues; nonverbal cues (demo/gesture)  -     Sit-Supine [place] (Bed Mobility) verbal cues; maximum assist (25% patient effort); 2 person assist  -     Assistive Device (Bed Mobility) head of bed elevated  -     Row Name 01/15/22 1615          Transfers    Comment (Transfers) upon standing pt. with significant crouched posture and forward flexed, unable to achieve full upright standing or shift weight to attempt steps  -[facility] Name 01/15/22 1615          Sit-Stand Transfer    Sit-Stand [place] (Transfers) verbal cues; maximum assist (25% patient effort); 2 person assist  -     Assistive Device (Sit-Stand Transfers) --  -MH     Row Name 01/15/22 1615          Gait/Stairs (Locomotion)    [place] Level (Gait) --  -     Assistive Device (Gait) --  -     Distance in Feet (Gait) --  -     Deviations/Abnormal Patterns (Gait) --  -     Bilateral Gait Deviations --  -     Comment (Gait/Stairs) --  -           User Key  (r) = Recorded By, (t) = Taken By, (c) = Cosigned By    Initials Name Provider Type     Radha Beckford PT Physical Therapist               Obj/Interventions     Row Name 01/15/22 1616          Range of Motion Comprehensive    General Range of Motion no range of motion deficits identified  -MH     Row Name 01/15/22 1616          Strength Comprehensive (MMT)    General Manual Muscle Testing (MMT) Assessment lower extremity strength deficits identified  -     Comment, General Manual Muscle Testing (MMT)  Assessment generalized weakness  -MH     Row Name 01/15/22 1616          Balance    Balance Assessment sitting static balance  -     Static Sitting Balance supported; sitting, edge of bed; moderate impairment  -     Balance Interventions sitting  -MH     Row Name 01/15/22 1616          Sensory Assessment (Somatosensory)    Sensory Assessment (Somatosensory) sensation intact  -           User Key  (r) = Recorded By, (t) = Taken By, (c) = Cosigned By    Initials Name Provider Type     Radha Beckford, PT Physical Therapist               Goals/Plan     Row Name 01/15/22 1624          Bed Mobility Goal 1 (PT)    Activity/Assistive Device (Bed Mobility Goal 1, PT) bed mobility activities, all  -MH     [place] Level/Cues Needed (Bed Mobility Goal 1, PT) contact guard assist  -     Time Frame (Bed Mobility Goal 1, PT) 1 week  -MH     Row Name 01/15/22 1624          Transfer Goal 1 (PT)    Activity/Assistive Device (Transfer Goal 1, PT) transfers, all  -     [place] Level/Cues Needed (Transfer Goal 1, PT) minimum assist (75% or more patient effort)  -     Time Frame (Transfer Goal 1, PT) 1 week  -MH     Row Name 01/15/22 1624          Gait Training Goal 1 (PT)    Activity/Assistive Device (Gait Training Goal 1, PT) gait (walking locomotion); walker, rolling  -     [place] Level (Gait Training Goal 1, PT) minimum assist (75% or more patient effort)  -     Distance (Gait Training Goal 1, PT) 25  -           User Key  (r) = Recorded By, (t) = Taken By, (c) = Cosigned By    Initials Name Provider Type     Radha Beckford, PT Physical Therapist               Clinical Impression     Row Name 01/15/22 1617          Pain    Additional Documentation Pain Scale: FACES Pre/Post-Treatment (Group)  -MH     Row Name 01/15/22 1617          Pain Scale: Numbers Pre/Post-Treatment    Pain Intervention(s) Repositioned  -MH     Row Name 01/15/22 1617          Pain Scale: FACES Pre/Post-Treatment    Pain:  FACES Scale, Pretreatment 2-->hurts little bit  -MH     Posttreatment Pain Rating 2-->hurts little bit  -MH     Pain Location back  -     Row Name 01/15/22 1617          Plan of Care Review    Plan of Care Reviewed With patient  -     Outcome Summary Pt. Is a 75 y/o male admitted from [facility] care unit after suffering a fall. Pt. Is A&Ox2 though difficult to understand at times due to mumbled speech. PMHx includes Parkinsons, Hyperlipedemia, HTN, and kidney stones. Pt. Required maxAx2 for bed mobility, though able to intermittently correct posture and follow commands. Pt. States he walks with rwx at baseline, attempted standing though pt. Remained very forward flexed/crouched therefore returned to bed. Anticipate pt. To return to [place] at D/C.  -     Row Name 01/15/22 1617          Therapy Assessment/Plan (PT)    Rehab Potential (PT) fair, will monitor progress closely  -     Criteria for Skilled Interventions Met (PT) yes  -     Predicted Duration of Therapy Intervention (PT) 1 week  -     Row Name 01/15/22 1617          Positioning and Restraints    Pre-Treatment Position in bed  -     Post Treatment Position bed  -MH     In Bed supine; notified nsg; call light within reach; encouraged to call for assist; exit alarm on  -           User Key  (r) = Recorded By, (t) = Taken By, (c) = Cosigned By    Initials Name Provider Type    Radha Bhakta, PT Physical Therapist               Outcome Measures     Row Name 01/15/22 1620          How much help from another person do you currently need...    Turning from your back to your side while in flat bed without using bedrails? 2  -MH     Moving from lying on back to sitting on the side of a flat bed without bedrails? 2  -MH     Moving to and from a bed to a chair (including a wheelchair)? 1  -MH     Standing up from a chair using your arms (e.g., wheelchair, bedside chair)? 2  -MH     Climbing 3-5 steps with a railing? 1  -MH      To walk in hospital room? 1  -[facility] 6 Clicks Score (PT) 9  -     Row Name 01/15/22 1624          Functional Assessment    Outcome Measure Options AM-PAC 6 Clicks Basic Mobility (PT)  -           User Key  (r) = Recorded By, (t) = Taken By, (c) = Cosigned By    Initials Name Provider Type     Radha Beckford PT Physical Therapist                             Physical Therapy Education                 Title: PT OT SLP Therapies (In Progress)     Topic: Physical Therapy (In Progress)     Point: Mobility training (Done)     Learning Progress Summary           Patient Acceptance, E,TB,D, VU,DU,NR by  at 1/15/2022 1625                   Point: Home exercise program (Not Started)     Learner Progress:  Not documented in this visit.          Point: Body mechanics (Not Started)     Learner Progress:  Not documented in this visit.          Point: Precautions (Done)     Learning Progress Summary           Patient Acceptance, E,TB,D, VU,DU,NR by  at 1/15/2022 1625                               User Key     Initials Effective Dates Name Provider Type Discipline     05/26/20 -  Radha Beckford PT Physical Therapist PT              PT Recommendation and Plan  Planned Therapy Interventions (PT): balance training, bed mobility training, gait training, home exercise program, postural re-education, patient/family education, neuromuscular re-education, ROM (range of motion), stair training, strengthening, stretching, transfer training  Plan of Care Reviewed With: patient  Outcome Summary: Pt. Is a 75 y/o male admitted from [facility] memory care unit after suffering a fall. Pt. Is A&Ox2 though difficult to understand at times due to mumbled speech. PMHx includes Parkinsons, Hyperlipedemia, HTN, and kidney stones. Pt. Required maxAx2 for bed mobility, though able to intermittently correct posture and follow commands. Pt. States he walks with rwx at baseline, attempted standing though pt. Remained very  forward flexed/crouched therefore returned to bed. Anticipate pt. To return to [place] at D/C.     Time Calculation:    PT Charges     Row Name 01/15/22 1626             Time Calculation    Start Time 1423  -      Stop Time 1435  -      Time Calculation (min) 12 min  -      PT Received On 01/15/22  -      PT - Next Appointment 01/17/22  -      PT Goal Re-Cert Due Date 01/22/22  -              Time Calculation- PT    Total Timed Code Minutes- PT 6 minute(s)  -            User Key  (r) = Recorded By, (t) = Taken By, (c) = Cosigned By    Initials Name Provider Type     Radha Beckford, PT Physical Therapist              Therapy Charges for Today     Code Description Service Date Service Provider Modifiers Qty    [id number] HC PT EVAL MOD COMPLEXITY 1 1/15/2022 Radah Becfkord, PT GP 1          PT G-Codes  Outcome Measure Options: AM-PAC 6 Clicks Basic Mobility (PT)  AM-PAC 6 Clicks Score (PT): 9    Radha Beckford PT  1/15/2022

## 2022-01-16 PROCEDURE — G0378 HOSPITAL OBSERVATION PER HR: HCPCS

## 2022-01-16 RX ADMIN — ASPIRIN 81 MG: 81 TABLET, COATED ORAL at 08:26

## 2022-01-16 RX ADMIN — CARBIDOPA AND LEVODOPA 2 TABLET: 25; 100 TABLET ORAL at 18:01

## 2022-01-16 RX ADMIN — PRAMIPEXOLE DIHYDROCHLORIDE 0.75 MG: 0.5 TABLET ORAL at 08:27

## 2022-01-16 RX ADMIN — PRAMIPEXOLE DIHYDROCHLORIDE 0.75 MG: 0.5 TABLET ORAL at 21:39

## 2022-01-16 RX ADMIN — CARBIDOPA AND LEVODOPA 2 TABLET: 25; 100 TABLET ORAL at 08:26

## 2022-01-16 RX ADMIN — ATORVASTATIN CALCIUM 20 MG: 20 TABLET, FILM COATED ORAL at 08:26

## 2022-01-16 RX ADMIN — CARBIDOPA AND LEVODOPA 2 TABLET: 25; 100 TABLET ORAL at 21:39

## 2022-01-16 RX ADMIN — OXYCODONE HYDROCHLORIDE AND ACETAMINOPHEN 500 MG: 500 TABLET ORAL at 08:27

## 2022-01-16 RX ADMIN — Medication 500 MCG: at 08:29

## 2022-01-16 RX ADMIN — PRAMIPEXOLE DIHYDROCHLORIDE 0.75 MG: 0.5 TABLET ORAL at 15:46

## 2022-01-16 RX ADMIN — MEMANTINE HYDROCHLORIDE 10 MG: 10 TABLET, FILM COATED ORAL at 08:28

## 2022-01-16 RX ADMIN — CARBIDOPA AND LEVODOPA 2 TABLET: 25; 100 TABLET ORAL at 11:54

## 2022-01-16 RX ADMIN — RIVASTIGMINE TARTRATE 6 MG: 1.5 CAPSULE ORAL at 21:39

## 2022-01-16 RX ADMIN — RIVASTIGMINE TARTRATE 6 MG: 1.5 CAPSULE ORAL at 08:28

## 2022-01-16 RX ADMIN — PANTOPRAZOLE SODIUM 40 MG: 40 TABLET, DELAYED RELEASE ORAL at 06:35

## 2022-01-16 RX ADMIN — Medication 1000 UNITS: at 08:27

## 2022-01-16 RX ADMIN — MEMANTINE HYDROCHLORIDE 10 MG: 10 TABLET, FILM COATED ORAL at 21:39

## 2022-01-16 NOTE — PLAN OF CARE
Goal Outcome Evaluation:           Progress: improving  Outcome Summary: Pt is 76/M here after fall at [place]. VSS. Room air. Bruising and skin tears noted. Mepilex added to coccyx for blanchable skin. Up to chair with assist x2 and walker. Pills in applesauce. Incontinent, brief in place. No complaints of pain. Tommy is for the [place] at d/c. Unable to educate. Will continue to monitor.

## 2022-01-16 NOTE — PLAN OF CARE
Goal Outcome Evaluation:   Patient pleasantly confused & alert to self only.  Assist x 2 with walker & difficult to ambulate.  Meds whole in applesauce.  Reg cardiac diet - tray setup, feeds self.  Incontinent bowel & bladder.  Turn q2hrs.  Ordered new mattress for accumax pump hookup.  VSS.  Will continue to monitor.

## 2022-01-16 NOTE — PROGRESS NOTES
Name: Rogelio Sher ADMIT: 2022   : 1945  PCP: Provider, No Known    MRN: [id number] LOS: 0 days   AGE/SEX: 76 y.o. male  ROOM: \[facility] [place] [facility]\""/1   Subjective   Chief Complaint   Patient presents with   • Fall     H/o parkinsons  On sinemet as outpatient  Has missed a few doses  +falls prior to admission  More awake today  Working with therapists      ROS  Unreliable due to pts condition    Objective   Vital Signs  Temp:  [96.8 °F (36 °C)-97.8 °F (36.6 °C)] 96.8 °F (36 °C)  Heart Rate:  [50-76] 50  Resp:  [16] 16  BP: (110-154)/(57-79) 132/57  SpO2:  [95 %-99 %] 95 %  on   ;   Device (Oxygen Therapy): room air  Body mass index is 27.78 kg/m².    Physical Exam  Constitutional:       General: He is in acute distress.      Appearance: He is ill-appearing (acutely and chronically).   Cardiovascular:      Rate and Rhythm: Normal rate and regular rhythm.   Pulmonary:      Effort: No respiratory distress.      Breath sounds: No wheezing.   Abdominal:      General: There is no distension.      Palpations: Abdomen is soft.      Tenderness: There is no abdominal tenderness.   Musculoskeletal:         General: Signs of injury (bruising on multiple extremities. No evidence for any fractures or pain with movement) present. No deformity.     much more awake today    Results Review:       I reviewed the patient's new clinical results.  Results from last 7 days   Lab Units 01/15/22  0815 22  1433   WBC 10*3/mm3 6.93 9.81   HEMOGLOBIN g/dL 13.6 13.2   PLATELETS 10*3/mm3 159 147     Results from last 7 days   Lab Units 01/15/22  0815 22  1433   SODIUM mmol/L 144 143   POTASSIUM mmol/L 3.5 3.6   CHLORIDE mmol/L 110* 106   CO2 mmol/L 23.5 29.0   BUN mg/dL 14 19   CREATININE mg/dL 0.79 0.81   GLUCOSE mg/dL 86 101*   Estimated Creatinine Clearance: 101.6 mL/min (by C-G formula based on SCr of 0.79 mg/dL).  Results from last 7 days   Lab Units 22  1433   ALBUMIN g/dL 4.00   BILIRUBIN mg/dL 1.1   ALK PHOS U/L 99    AST (SGOT) U/L 74*   ALT (SGPT) U/L <5     Results from last 7 days   Lab Units 01/15/22  0815 01/14/22  1433   CALCIUM mg/dL 8.3* 8.6   ALBUMIN g/dL  --  4.00         Coag     HbA1C No results found for: HGBA1C  Infection     Radiology(recent) No radiology results for the last day  No results found for: TROPONINT, TROPONINI, BNP  No components found for: TSH;2    ascorbic acid, 500 mg, Oral, Daily  aspirin, 81 mg, Oral, Daily  atorvastatin, 20 mg, Oral, Daily  carbidopa-levodopa, 2 tablet, Oral, 4x Daily  cholecalciferol, 1,000 Units, Oral, Daily  memantine, 10 mg, Oral, Q12H  multivitamin with minerals, 1 tablet, Oral, Daily  pantoprazole, 40 mg, Oral, QAM  pramipexole, 0.75 mg, Oral, TID  rivastigmine, 6 mg, Oral, BID  vitamin B-12, 500 mcg, Oral, Daily       Diet Regular; Cardiac      Assessment/Plan      Active Hospital Problems    Diagnosis  POA   • **Parkinson's disease (HCC) [G20]  Yes   • GERD (gastroesophageal reflux disease) [K21.9]  Yes   • DNR (do not resuscitate) [Z66]  Yes   • Essential hypertension [I10]  Yes   • Generalized weakness [R53.1]  Yes      Resolved Hospital Problems   No resolved problems to display.       · Restarted agents for his Parkinson's. I think missing a few doses has made him more withdrawn and less active.  He has had falls but no evidence for any fracture or acute injury besides some bruising.  Also no evidence for infection or any significant electrolyte abnormalities.  Discussed the case with his physician daughterat bedside.  Seems to be mostly a progression of the patient's Parkinson's disease and that he has become more debilitated and that things are worse with missing a few doses of his Parkinson's medication.  · PPI, ASA  · PT, OT  · Labs reviewed  · Discussed code status- DNR/DNI per family. Son confirmed with his sister.    I also reviewed notes from documentation of his admission a couple of months ago.  At that time he did have MRI of the brain to rule out acute  stroke which was negative.  They did have neurology see who had increased his Sinemet to 4 times a day but that was about it.  Discussed with patient's son and daughter at bedside who does not feel additional neurology consultation would be of benefit at this time, and I do agree.      DW RN  LEONOR family  Greater than 37 minutes spent with greater than 50% counseling and coordinating care      Jose Toscano MD  [facility]  01/16/22  14:29 EST

## 2022-01-16 NOTE — PLAN OF CARE
Goal Outcome Evaluation:  Plan of Care Reviewed With: patient        Progress: improving   Patient is alert to self. He is pleasant and cooperative. Patients speech is garbled at times. He is incontinent of bowel and bladder, refused his purewik. Will monitor

## 2022-01-17 PROCEDURE — G0378 HOSPITAL OBSERVATION PER HR: HCPCS

## 2022-01-17 PROCEDURE — 97530 THERAPEUTIC ACTIVITIES: CPT

## 2022-01-17 PROCEDURE — 97166 OT EVAL MOD COMPLEX 45 MIN: CPT

## 2022-01-17 PROCEDURE — 97535 SELF CARE MNGMENT TRAINING: CPT

## 2022-01-17 RX ORDER — ASPIRIN 81 MG/1
81 TABLET, CHEWABLE ORAL DAILY
Status: DISCONTINUED | OUTPATIENT
Start: 2022-01-17 | End: 2022-01-21 | Stop reason: HOSPADM

## 2022-01-17 RX ADMIN — PRAMIPEXOLE DIHYDROCHLORIDE 0.75 MG: 0.5 TABLET ORAL at 09:40

## 2022-01-17 RX ADMIN — OXYCODONE HYDROCHLORIDE AND ACETAMINOPHEN 500 MG: 500 TABLET ORAL at 09:42

## 2022-01-17 RX ADMIN — MEMANTINE HYDROCHLORIDE 10 MG: 10 TABLET, FILM COATED ORAL at 09:40

## 2022-01-17 RX ADMIN — RIVASTIGMINE TARTRATE 6 MG: 1.5 CAPSULE ORAL at 09:40

## 2022-01-17 RX ADMIN — Medication 500 MCG: at 09:42

## 2022-01-17 RX ADMIN — ASPIRIN 81 MG: 81 TABLET, CHEWABLE ORAL at 14:43

## 2022-01-17 RX ADMIN — PRAMIPEXOLE DIHYDROCHLORIDE 0.75 MG: 0.5 TABLET ORAL at 17:48

## 2022-01-17 RX ADMIN — CARBIDOPA AND LEVODOPA 2 TABLET: 25; 100 TABLET ORAL at 12:46

## 2022-01-17 RX ADMIN — MEMANTINE HYDROCHLORIDE 10 MG: 10 TABLET, FILM COATED ORAL at 22:14

## 2022-01-17 RX ADMIN — CARBIDOPA AND LEVODOPA 2 TABLET: 25; 100 TABLET ORAL at 22:16

## 2022-01-17 RX ADMIN — CARBIDOPA AND LEVODOPA 2 TABLET: 25; 100 TABLET ORAL at 09:41

## 2022-01-17 RX ADMIN — ATORVASTATIN CALCIUM 20 MG: 20 TABLET, FILM COATED ORAL at 12:46

## 2022-01-17 RX ADMIN — Medication 1000 UNITS: at 09:40

## 2022-01-17 RX ADMIN — CARBIDOPA AND LEVODOPA 2 TABLET: 25; 100 TABLET ORAL at 17:48

## 2022-01-17 RX ADMIN — RIVASTIGMINE TARTRATE 6 MG: 1.5 CAPSULE ORAL at 22:15

## 2022-01-17 NOTE — THERAPY TREATMENT NOTE
Patient Name: Rogelio Sher  : 1945    MRN: [id number]                              Today's Date: 2022       Admit Date: 2022    Visit Dx:     ICD-10-CM ICD-9-CM   1. Parkinson's disease (HCC)  G20 332.0   2. Fall, initial encounter  W19.XXXA E888.9   3. Failure to thrive in adult  R62.7 783.7     Patient Active Problem List   Diagnosis   • Generalized weakness   • Parkinson's disease (HCC)   • Essential hypertension   • Influenza A   • GERD (gastroesophageal reflux disease)   • DNR (do not resuscitate)     Past Medical History:   Diagnosis Date   • Chorioretinitis     histoplasmosis left eye   • GERD (gastroesophageal reflux disease)    • Hyperlipidemia    • Hypertension    • Kidney stone    • Parkinson's disease (HCC)      Past Surgical History:   Procedure Laterality Date   • CATARACT EXTRACTION, BILATERAL     • COLONOSCOPY     • KIDNEY STONE SURGERY     • TONSILLECTOMY        General Information     Row Name 22 1517          Physical Therapy Time and Intention    Document Type therapy note (daily note)  -     Mode of Treatment physical therapy  -     Row Name 22 1517          Cognition    Orientation Status (Cognition) oriented to; person  -     Row Name 22 1517          Safety Issues, Functional Mobility    Impairments Affecting Function (Mobility) balance; cognition; endurance/activity tolerance; coordination; postural/trunk control  -     Cognitive Impairments, Mobility Safety/Performance awareness, need for assistance; insight into deficits/self-awareness; judgment; problem-solving/reasoning; safety precaution awareness; sequencing abilities  -           User Key  (r) = Recorded By, (t) = Taken By, (c) = Cosigned By    Initials Name Provider Type    PH Cyndie Bean PTA Physical Therapy Assistant               Mobility     Row Name 22 1518          Bed Mobility    Comment (Bed Mobility) Pt UI and returned to chair  -     Row Name 22  1518          Transfers    Comment (Transfers) vc for B hand placement w/ repeat of cues given.  -PH     Row Name 01/17/22 1518          Sit-Stand Transfer    Sit-Stand Amador (Transfers) contact guard; minimum assist (75% patient effort); 2 person assist; verbal cues; nonverbal cues (demo/gesture)  -PH     Assistive Device (Sit-Stand Transfers) walker, front-wheeled  -PH     Row Name 01/17/22 1518          Gait/Stairs (Locomotion)    Amador Level (Gait) contact guard; minimum assist (75% patient effort); 2 person assist; verbal cues  -PH     Assistive Device (Gait) walker, front-wheeled  -PH     Distance in Feet (Gait) 50'  -PH     Deviations/Abnormal Patterns (Gait) odalys decreased; festinating/shuffling; gait speed decreased; stride length decreased  -PH     Bilateral Gait Deviations forward flexed posture  -PH     Amador Level (Stairs) unable to assess  -PH     Comment (Gait/Stairs) Pt moderately slow and shuffling; assist for turning after vc given and pt not following instruction; pt mildly unsteady w/ no LOB  -PH           User Key  (r) = Recorded By, (t) = Taken By, (c) = Cosigned By    Initials Name Provider Type    PH Cyndie Bean PTA Physical Therapy Assistant               Obj/Interventions     Row Name 01/17/22 1520          Motor Skills    Therapeutic Exercise other (see comments)  BAP, LAQ, seated march, hip abd, SLR; x 10-15; vc/tc to incr ROM; pt unable to unstand how to perform SAQ w/ several demos and vc/tc  -PH     Row Name 01/17/22 1520          Balance    Balance Assessment standing static balance  -PH     Static Standing Balance WFL; supported; standing  -PH           User Key  (r) = Recorded By, (t) = Taken By, (c) = Cosigned By    Initials Name Provider Type     Cyndie Bean PTA Physical Therapy Assistant               Goals/Plan    No documentation.                Clinical Impression     Row Name 01/17/22 1521          Pain    Additional  Documentation Pain Scale: FACES Pre/Post-Treatment (Group)  -PH     Row Name 01/17/22 1521          Pain Scale: Numbers Pre/Post-Treatment    Pain Intervention(s) Repositioned; Ambulation/increased activity; Rest  -PH     Row Name 01/17/22 1521          Pain Scale: FACES Pre/Post-Treatment    Pain: FACES Scale, Pretreatment 0-->no hurt  -PH     Posttreatment Pain Rating 0-->no hurt  -PH     Row Name 01/17/22 1521          Plan of Care Review    Plan of Care Reviewed With patient  -PH     Progress improving  -PH     Outcome Summary Pt UIC at beg of session w/ PT. Pt req simple commands throughout tx. Pt stood from chair req CGA/min A and use of fww. Pt amb 50' w/ mild unsteadiness req CGA/ min A and use of fww. Pt req assist for turns when cued to turn a few times, but not following instructions. Pt returned to sit in chair to perform BLE ther ex for strengthening. PT will prog as pt cuauhtemoc.  -PH     Row Name 01/17/22 1521          Positioning and Restraints    Pre-Treatment Position sitting in chair/recliner  -PH     Post Treatment Position chair  -PH     In Chair sitting; encouraged to call for assist; call light within reach; exit alarm on  -PH           User Key  (r) = Recorded By, (t) = Taken By, (c) = Cosigned By    Initials Name Provider Type    PH Cyndie Bean PTA Physical Therapy Assistant               Outcome Measures     Row Name 01/17/22 1524          How much help from another person do you currently need...    Turning from your back to your side while in flat bed without using bedrails? 3  -PH     Moving from lying on back to sitting on the side of a flat bed without bedrails? 3  -PH     Moving to and from a bed to a chair (including a wheelchair)? 3  -PH     Standing up from a chair using your arms (e.g., wheelchair, bedside chair)? 3  -PH     Climbing 3-5 steps with a railing? 2  -PH     To walk in hospital room? 3  -PH     AM-PAC 6 Clicks Score (PT) 17  -PH     Row Name 01/17/22 1528  01/17/22 1214       Functional Assessment    Outcome Measure Options AM-PAC 6 Clicks Basic Mobility (PT)  -PH AM-PAC 6 Clicks Daily Activity (OT)  -BL          User Key  (r) = Recorded By, (t) = Taken By, (c) = Cosigned By    Initials Name Provider Type     Cyndie Bean PTA Physical Therapy Assistant    Mary Zendejas OT Occupational Therapist                             Physical Therapy Education                 Title: PT OT SLP Therapies (In Progress)     Topic: Physical Therapy (Done)     Point: Mobility training (Done)     Learning Progress Summary           Patient Acceptance, E,D, VU by  at 1/17/2022 1524    Acceptance, E,TB,D, VU,DU,NR by  at 1/15/2022 1625                   Point: Home exercise program (Done)     Learning Progress Summary           Patient Acceptance, E,D, VU by  at 1/17/2022 1524                   Point: Body mechanics (Done)     Learning Progress Summary           Patient Acceptance, E,D, VU by  at 1/17/2022 1524                   Point: Precautions (Done)     Learning Progress Summary           Patient Acceptance, E,D, VU by  at 1/17/2022 1524    Acceptance, E,TB,D, VU,DU,NR by  at 1/15/2022 1625                               User Key     Initials Effective Dates Name Provider Type Discipline     06/16/21 -  Cyndie Bean PTA Physical Therapy Assistant PT     05/26/20 -  Radha Beckford PT Physical Therapist PT              PT Recommendation and Plan     Plan of Care Reviewed With: patient  Progress: improving  Outcome Summary: Pt UIC at beg of session w/ PT. Pt req simple commands throughout tx. Pt stood from chair req CGA/min A and use of fww. Pt amb 50' w/ mild unsteadiness req CGA/ min A and use of fww. Pt req assist for turns when cued to turn a few times, but not following instructions. Pt returned to sit in chair to perform BLE ther ex for strengthening. PT will prog as pt cuauhtemoc.     Time Calculation:    PT Charges     Row Name 01/17/22  1525             Time Calculation    Start Time 1358  -PH      Stop Time 1413  -PH      Time Calculation (min) 15 min  -PH      PT Received On 01/17/22  -PH      PT - Next Appointment 01/19/22  -PH              Timed Charges    80108 - PT Therapeutic Activity Minutes 10  -PH              Total Minutes    Timed Charges Total Minutes 10  -PH       Total Minutes 10  -PH            User Key  (r) = Recorded By, (t) = Taken By, (c) = Cosigned By    Initials Name Provider Type    PH Cyndie Bean PTA Physical Therapy Assistant              Therapy Charges for Today     Code Description Service Date Service Provider Modifiers Qty    [id number]  PT THERAPEUTIC ACT EA 15 MIN 1/17/2022 Cyndie Bena PTA GP 1    92035888971 HC PT THER SUPP EA 15 MIN 1/17/2022 Cyndie Bean PTA GP 1          PT G-Codes  Outcome Measure Options: AM-PAC 6 Clicks Basic Mobility (PT)  AM-PAC 6 Clicks Score (PT): 17  AM-PAC 6 Clicks Score (OT): 9    Cyndie Bean PTA  1/17/2022

## 2022-01-17 NOTE — PLAN OF CARE
Goal Outcome Evaluation:  Plan of Care Reviewed With: patient        Progress: improving  Outcome Summary: Pt UIC at beg of session w/ PT. Pt req simple commands throughout tx. Pt stood from chair req CGA/min A and use of fww. Pt amb 50' w/ mild unsteadiness req CGA/ min A and use of fww. Pt req assist for turns when cued to turn a few times, but not following instructions. Pt returned to sit in chair to perform BLE ther ex for strengthening. PT will prog as pt cuauhtemoc.    Patient was intermittently wearing a face mask during this therapy encounter. Therapist used appropriate personal protective equipment including eye protection, mask, and gloves.  Mask used was standard procedure mask. Appropriate PPE was worn during the entire therapy session. Hand hygiene was completed before and after therapy session. Patient is not in enhanced droplet precautions.  PT tech for safety: Holly DURON

## 2022-01-17 NOTE — PLAN OF CARE
Goal Outcome Evaluation:              Outcome Summary: Pt is pleasantly confused. Alert to self only, up with assist and on room air. Pt was moved to a room closer to nurse's station as he managed to move from the bed to the recliner by himself before alarm noted staff. Pt was not injured. Safe report filed. No complaints this shift. VSS will continue to monitor.

## 2022-01-17 NOTE — PLAN OF CARE
Goal Outcome Evaluation:  Plan of Care Reviewed With: patient        Progress: no change  Outcome Summary: Pt seen by OT this date for evaluation. Pt is a 75 y/o male admit to [facility] for falls as pt does have a PD dx,, Hyperlipedemia, HTN, and kidney stones. Pt is from the memory care unit at [facility]. Upon arrival pt lying supine in bed, no c/o pain, A&O to self only. Pt Max A x2 for bed mobility for sup>sit>sup transition with inability to follow simple commands this date. Pt kept eyes closed this date even with verbal/tactile cues. Pt very resistive to movement this date which created difficulty for therapist to formally assess strength and coordination. Pt Dependent for feeding this date as pt unable to hold utensil and initiate hand>mouth excursion. Pt Max A for grooming task this date sitting up in bed. Pt left in supine, needs in reach, alarm on. Pt to benefit from skilled OT services if able to cooperate to address goals and deficits. Pt likely to D/C back to [facility]. OT wore mask, gloves, glasses, hand hygiene performed.

## 2022-01-17 NOTE — THERAPY EVALUATION
Patient Name: Rogelio Sher  : 1945    MRN: [id number]                              Today's Date: 2022       Admit Date: 2022    Visit Dx:     ICD-10-CM ICD-9-CM   1. Parkinson's disease (HCC)  G20 332.0   2. Fall, initial encounter  W19.XXXA E888.9   3. Failure to thrive in adult  R62.7 783.7     Patient Active Problem List   Diagnosis   • Generalized weakness   • Parkinson's disease (HCC)   • Essential hypertension   • Influenza A   • GERD (gastroesophageal reflux disease)   • DNR (do not resuscitate)     Past Medical History:   Diagnosis Date   • Chorioretinitis     histoplasmosis left eye   • GERD (gastroesophageal reflux disease)    • Hyperlipidemia    • Hypertension    • Kidney stone    • Parkinson's disease (HCC)      Past Surgical History:   Procedure Laterality Date   • CATARACT EXTRACTION, BILATERAL     • COLONOSCOPY     • KIDNEY STONE SURGERY     • TONSILLECTOMY        General Information     Row Name 22 1153          OT Time and Intention    Document Type evaluation  -BL     Mode of Treatment individual therapy; occupational therapy  -     Row Name 22 1153          General Information    Patient Profile Reviewed yes  -BL     Existing Precautions/Restrictions fall  -BL     Barriers to Rehab cognitive status; previous functional deficit; medically complex; uncooperative  -     Row Name 22 1153          Living Environment    Lives With facility resident  -     Row Name 22 1153          Cognition    Orientation Status (Cognition) oriented to; person  -     Row Name 22 1153          Safety Issues, Functional Mobility    Safety Issues Affecting Function (Mobility) ability to follow commands; insight into deficits/self-awareness; awareness of need for assistance; judgment; problem-solving; safety precaution awareness; sequencing abilities  -BL     Impairments Affecting Function (Mobility) balance; cognition; coordination; endurance/activity tolerance;  strength; postural/trunk control; motor planning  -     Cognitive Impairments, Mobility Safety/Performance awareness, need for assistance; attention; insight into deficits/self-awareness; judgment; problem-solving/reasoning; safety precaution awareness; sequencing abilities  -           User Key  (r) = Recorded By, (t) = Taken By, (c) = Cosigned By    Initials Name Provider Type     Mary Bradford OT Occupational Therapist                 Mobility/ADL's     Row Name 01/17/22 1154          Bed Mobility    Bed Mobility supine-sit; sit-supine; scooting/bridging  -     Scooting/Bridging [place] (Bed Mobility) dependent (less than 25% patient effort); 2 person assist; verbal cues; nonverbal cues (demo/gesture)  -     Supine-Sit [place] (Bed Mobility) maximum assist (25% patient effort); 2 person assist; verbal cues; nonverbal cues (demo/gesture)  -     Sit-Supine [place] (Bed Mobility) 2 person assist; maximum assist (25% patient effort); verbal cues; nonverbal cues (demo/gesture)  -     Assistive Device (Bed Mobility) bed rails; draw sheet; head of bed elevated  -     Comment (Bed Mobility) Pt very resistant during bed mobility and kept eyes shut during evaluation.  -     Row Name 01/17/22 1154          Transfers    Comment (Transfers) NT-pt not safe to transfer this date as pt requires increase asisstance to sit EOB and difficulty with folloeing simple commands, also noted with impaired static sitting balance  -     Sit-Stand [place] (Transfers) not tested  -     Row Name 01/17/22 1154          Activities of Daily Living    BADL Assessment/Intervention grooming; feeding  -     Row Name 01/17/22 1154          Grooming Assessment/Training    [place] Level (Grooming) maximum assist (25% patient effort); wash face, hands; verbal cues; nonverbal cues (demo/gesture)  -     Row Name 01/17/22 1154          Self-Feeding Assessment/Training    [place] Level (Feeding)  feeding skills; prepare tray/open items; scoop food and bring to mouth; dependent (less than 25% patient effort)  -     Position (Self-Feeding) sitting up in bed  -           User Key  (r) = Recorded By, (t) = Taken By, (c) = Cosigned By    Initials Name Provider Type     Mary Bradford OT Occupational Therapist               Obj/Interventions     [facility] Name 01/17/22 1158          Sensory Assessment (Somatosensory)    Sensory Assessment (Somatosensory) unable/difficult to assess  -[facility] Name 01/17/22 1158          Vision Assessment/Intervention    Visual Impairment/Limitations unable/difficult to assess  -     Vision Assessment Comment pt would not open eyes this session  -[facility] Name 01/17/22 1158          Range of Motion Comprehensive    Comment, General Range of Motion unable to assess ROM as pt very resistive this date and unable to follow commands. Pt kept elbows in flexed position even with therapist attempting to bring elbows into extension  -[facility] Name 01/17/22 1158          Strength Comprehensive (MMT)    Comment, General Manual Muscle Testing (MMT) Assessment unable to formally assess but pt demonstrates good strength noted as pt is resistant this date  -[facility] Name 01/17/22 1158          Balance    Balance Assessment sitting static balance  -     Static Sitting Balance moderate impairment; unsupported; sitting, edge of bed  -     Balance Interventions sitting; supported; static; occupation based/functional task  -           User Key  (r) = Recorded By, (t) = Taken By, (c) = Cosigned By    Initials Name Provider Type     Mary Bradford OT Occupational Therapist               Goals/Plan     Row Name 01/17/22 1213          Bed Mobility Goal 1 (OT)    Activity/Assistive Device (Bed Mobility Goal 1, OT) bed mobility activities, all  -BL     Steele Level/Cues Needed (Bed Mobility Goal 1, OT) moderate assist (50-74% patient effort)  -     Time Frame (Bed Mobility  Goal 1, OT) short term goal (STG); 2 weeks  -BL     Progress/Outcomes (Bed Mobility Goal 1, OT) continuing progress toward goal  -BL     Row Name 01/17/22 1213          Transfer Goal 1 (OT)    Activity/Assistive Device (Transfer Goal 1, OT) sit-to-stand/stand-to-sit; bed-to-chair/chair-to-bed  -BL     Ashley Level/Cues Needed (Transfer Goal 1, OT) moderate assist (50-74% patient effort)  -BL     Time Frame (Transfer Goal 1, OT) short term goal (STG); 2 weeks  -BL     Progress/Outcome (Transfer Goal 1, OT) continuing progress toward goal  -BL     Row Name 01/17/22 1213          Grooming Goal 1 (OT)    Activity/Device (Grooming Goal 1, OT) grooming skills, all  -BL     Ashley (Grooming Goal 1, OT) moderate assist (50-74% patient effort)  -BL     Time Frame (Grooming Goal 1, OT) short term goal (STG); 2 weeks  -BL     Progress/Outcome (Grooming Goal 1, OT) continuing progress toward goal  -BL     Row Name 01/17/22 1213          Self-Feeding Goal 1 (OT)    Activity/Device (Self-Feeding Goal 1, OT) self-feeding skills, all  -BL     Ashley Level/Cues Needed (Self-Feeding Goal 1, OT) moderate assist (50-74% patient effort)  -BL     Time Frame (Self-Feeding Goal 1, OT) short term goal (STG); 2 weeks  -BL     Progress/Outcomes (Self-Feeding Goal 1, OT) continuing progress toward goal  -BL     Row Name 01/17/22 1213          Therapy Assessment/Plan (OT)    Planned Therapy Interventions (OT) occupation/activity based interventions; IADL retraining; BADL retraining; transfer/mobility retraining; patient/caregiver education/training; activity tolerance training  -           User Key  (r) = Recorded By, (t) = Taken By, (c) = Cosigned By    Initials Name Provider Type    BL Mary Bradford OT Occupational Therapist               Clinical Impression     [facility] Name 01/17/22 1200          Pain Assessment    Additional Documentation Pain Scale: FACES Pre/Post-Treatment (Group)  -BL     Row Name 01/17/22 1200           Pain Scale: FACES Pre/Post-Treatment    Pain: FACES Scale, Pretreatment 0-->no hurt  -BL     Posttreatment Pain Rating 2-->hurts little bit  -BL     Pain Location back  -     Row Name 01/17/22 1200          Plan of Care Review    Plan of Care Reviewed With patient  -BL     Progress no change  -BL     Outcome Summary Pt seen by OT this date for evaluation. Pt is a 77 y/o male admit to [facility] for falls as pt does have a PD dx,, Hyperlipedemia, HTN, and kidney stones. Pt is from the memory care unit at [place]. Upon arrival pt lying supine in bed, no c/o pain, A&O to self only. Pt Max A x2 for bed mobility for sup>sit>sup transition with inability to follow simple commands this date. Pt kept eyes closed this date even with verbal/tactile cues. Pt very resistive to movement this date which created difficulty for therapist to formally assess strength and coordination. Pt Dependent for feeding this date as pt unable to hold utensil and initiate hand>mouth excursion. Pt Max A for grooming task this date sitting up in bed. Pt left in supine, needs in reach, alarm on. Pt to benefit from skilled OT services if able to cooperate to address goals and deficits. Pt likely to D/C back to [facility]. OT wore mask, gloves, glasses, hand hygiene performed.  -     Row Name 01/17/22 1200          Therapy Assessment/Plan (OT)    Rehab Potential (OT) fair, will monitor progress closely  -     Criteria for Skilled Therapeutic Interventions Met (OT) skilled treatment is necessary  -     Therapy Frequency (OT) 3 times/wk  -     Row Name 01/17/22 1200          Therapy Plan Review/Discharge Plan (OT)    Anticipated Discharge Disposition (OT) long-term acute care facility  -     Row Name 01/17/22 1200          Vital Signs    Pre Patient Position Supine  -BL     Intra Patient Position Sitting  -BL     Post Patient Position Supine  -BL     Row Name 01/17/22 1200          Positioning and Restraints    Pre-Treatment  Position in bed  -BL     Post Treatment Position bed  -BL     In Bed supine; call light within reach; encouraged to call for assist; exit alarm on  -BL           User Key  (r) = Recorded By, (t) = Taken By, (c) = Cosigned By    Initials Name Provider Type    Mary Zendejas OT Occupational Therapist               Outcome Measures     Row Name 01/17/22 1214          How much help from another is currently needed...    Putting on and taking off regular lower body clothing? 1  -BL     Bathing (including washing, rinsing, and drying) 1  -BL     Toileting (which includes using toilet bed pan or urinal) 1  -BL     Putting on and taking off regular upper body clothing 2  -BL     Taking care of personal grooming (such as brushing teeth) 2  -BL     Eating meals 2  -BL     AM-PAC 6 Clicks Score (OT) 9  -BL     Row Name 01/17/22 1214          Functional Assessment    Outcome Measure Options AM-PAC 6 Clicks Daily Activity (OT)  -BL           User Key  (r) = Recorded By, (t) = Taken By, (c) = Cosigned By    Initials Name Provider Type    Mary Zendejas OT Occupational Therapist                Occupational Therapy Education                 Title: PT OT SLP Therapies (In Progress)     Topic: Occupational Therapy (In Progress)     Point: ADL training (In Progress)     Description:   Instruct learner(s) on proper safety adaptation and remediation techniques during self care or transfers.   Instruct in proper use of assistive devices.              Learning Progress Summary           Patient Acceptance, E, NR,NL by  at 1/17/2022 1215    Comment: the role of OT                   Point: Home exercise program (Not Started)     Description:   Instruct learner(s) on appropriate technique for monitoring, assisting and/or progressing therapeutic exercises/activities.              Learner Progress:  Not documented in this visit.          Point: Precautions (Not Started)     Description:   Instruct learner(s) on prescribed  precautions during self-care and functional transfers.              Learner Progress:  Not documented in this visit.          Point: Body mechanics (Not Started)     Description:   Instruct learner(s) on proper positioning and spine alignment during self-care, functional mobility activities and/or exercises.              Learner Progress:  Not documented in this visit.                      User Key     Initials Effective Dates Name Provider Type Discipline     01/05/21 -  Mary Bradford OT Occupational Therapist OT              OT Recommendation and Plan  Planned Therapy Interventions (OT): occupation/activity based interventions, IADL retraining, BADL retraining, transfer/mobility retraining, patient/caregiver education/training, activity tolerance training  Therapy Frequency (OT): 3 times/wk  Plan of Care Review  Plan of Care Reviewed With: patient  Progress: no change  Outcome Summary: Pt seen by OT this date for evaluation. Pt is a 77 y/o male admit to [facility] for falls as pt does have a PD dx,, Hyperlipedemia, HTN, and kidney stones. Pt is from the memory care unit at [place]. Upon arrival pt lying supine in bed, no c/o pain, A&O to self only. Pt Max A x2 for bed mobility for sup>sit>sup transition with inability to follow simple commands this date. Pt kept eyes closed this date even with verbal/tactile cues. Pt very resistive to movement this date which created difficulty for therapist to formally assess strength and coordination. Pt Dependent for feeding this date as pt unable to hold utensil and initiate hand>mouth excursion. Pt Max A for grooming task this date sitting up in bed. Pt left in supine, needs in reach, alarm on. Pt to benefit from skilled OT services if able to cooperate to address goals and deficits. Pt likely to D/C back to [facility]. OT wore mask, gloves, glasses, hand hygiene performed.     Time Calculation:    Time Calculation- OT     Row Name 01/17/22 1215             Time  Calculation- OT    OT Start Time 0814  -BL      OT Stop Time 0834  -BL      OT Time Calculation (min) 20 min  -BL      Total Timed Code Minutes- OT 15 minute(s)  -BL      OT Received On 01/17/22  -BL      OT - Next Appointment 01/19/22  -BL      OT Goal Re-Cert Due Date 01/31/22  -BL              Timed Charges    04419 - OT Self Care/Mgmt Minutes 15  -BL              Untimed Charges    OT Eval/Re-eval Minutes 5  -BL              Total Minutes    Timed Charges Total Minutes 15  -BL      Untimed Charges Total Minutes 5  -BL       Total Minutes 20  -BL            User Key  (r) = Recorded By, (t) = Taken By, (c) = Cosigned By    Initials Name Provider Type     Mary Bradford OT Occupational Therapist              Therapy Charges for Today     Code Description Service Date Service Provider Modifiers Qty    86651848947 HC OT SELF CARE/MGMT/TRAIN EA 15 MIN 1/17/2022 Mary Bradford OT GO 1    28665335638 HC OT EVAL MOD COMPLEXITY 3 1/17/2022 Mary Bradford OT GO 1               Mary Bradford OT  1/17/2022

## 2022-01-17 NOTE — CASE MANAGEMENT/SOCIAL WORK
Continued Stay Note  [facility]     Patient Name: Rogelio Sher  MRN: [id number]  Today's Date: 1/17/2022    Admit Date: 1/14/2022     Discharge Plan     Row Name 01/17/22 1211       Plan    Plan [facility] accepted pending bed availability per Covid-19 preparedness.    Patient/Family in Agreement with Plan yes    Plan Comments Voice message left for Lisa[facility] regarding SNF referrals. Received call from [facility] stating [facility] accepted pending bed availability per Covid-19 preparedness. Call placed to Susan/patient's daughter to update. Explained no beds are currently available but facility hopes to have one later this week. Verbalized understanding. Agreeable with [facility] arranging w/c or stretcher transport at discharge. Provided family with reservation information to pay over phone. Packet in office. Continue to follow.....[facility]               Discharge Codes    No documentation.                     Bridgett Hackett RN

## 2022-01-18 PROCEDURE — G0378 HOSPITAL OBSERVATION PER HR: HCPCS

## 2022-01-18 RX ADMIN — PRAMIPEXOLE DIHYDROCHLORIDE 0.75 MG: 0.5 TABLET ORAL at 20:34

## 2022-01-18 RX ADMIN — CARBIDOPA AND LEVODOPA 2 TABLET: 25; 100 TABLET ORAL at 09:02

## 2022-01-18 RX ADMIN — RIVASTIGMINE TARTRATE 6 MG: 1.5 CAPSULE ORAL at 20:34

## 2022-01-18 RX ADMIN — MEMANTINE HYDROCHLORIDE 10 MG: 10 TABLET, FILM COATED ORAL at 20:34

## 2022-01-18 RX ADMIN — Medication 1000 UNITS: at 09:02

## 2022-01-18 RX ADMIN — MEMANTINE HYDROCHLORIDE 10 MG: 10 TABLET, FILM COATED ORAL at 09:02

## 2022-01-18 RX ADMIN — PRAMIPEXOLE DIHYDROCHLORIDE 0.75 MG: 0.5 TABLET ORAL at 17:42

## 2022-01-18 RX ADMIN — CARBIDOPA AND LEVODOPA 2 TABLET: 25; 100 TABLET ORAL at 17:42

## 2022-01-18 RX ADMIN — CARBIDOPA AND LEVODOPA 2 TABLET: 25; 100 TABLET ORAL at 20:34

## 2022-01-18 RX ADMIN — OXYCODONE HYDROCHLORIDE AND ACETAMINOPHEN 500 MG: 500 TABLET ORAL at 09:02

## 2022-01-18 RX ADMIN — Medication 500 MCG: at 09:02

## 2022-01-18 RX ADMIN — CARBIDOPA AND LEVODOPA 2 TABLET: 25; 100 TABLET ORAL at 11:40

## 2022-01-18 RX ADMIN — ASPIRIN 81 MG: 81 TABLET, CHEWABLE ORAL at 09:02

## 2022-01-18 RX ADMIN — RIVASTIGMINE TARTRATE 6 MG: 1.5 CAPSULE ORAL at 11:40

## 2022-01-18 RX ADMIN — MULTIPLE VITAMINS W/ MINERALS TAB 1 TABLET: TAB at 09:02

## 2022-01-18 RX ADMIN — SODIUM CHLORIDE 250 ML: 9 INJECTION, SOLUTION INTRAVENOUS at 15:03

## 2022-01-18 RX ADMIN — ATORVASTATIN CALCIUM 20 MG: 20 TABLET, FILM COATED ORAL at 09:02

## 2022-01-18 RX ADMIN — PRAMIPEXOLE DIHYDROCHLORIDE 0.75 MG: 0.5 TABLET ORAL at 09:02

## 2022-01-18 NOTE — PROGRESS NOTES
Name: Rogelio Sher ADMIT: 2022   : 1945  PCP: Provider, No Known    MRN: [id number] LOS: 0 days   AGE/SEX: 76 y.o. male  ROOM: [facility]/   Subjective   Chief Complaint   Patient presents with   • Fall     H/o parkinsons  On sinemet as outpatient  Has missed a few doses  +falls prior to admission  Sleeping this morning  Working with therapists      ROS  Unreliable due to pts condition    Objective   Vital Signs  Temp:  [97.2 °F (36.2 °C)-100.1 °F (37.8 °C)] 97.8 °F (36.6 °C)  Heart Rate:  [65-78] 66  Resp:  [16-18] 18  BP: (131-171)/(77-88) 131/77  SpO2:  [96 %-97 %] 96 %  on   ;   Device (Oxygen Therapy): room air  Body mass index is 27.78 kg/m².    Physical Exam  Constitutional:       General: He is in acute distress.      Appearance: He is ill-appearing (acutely and chronically).   Cardiovascular:      Rate and Rhythm: Normal rate and regular rhythm.   Pulmonary:      Effort: No respiratory distress.      Breath sounds: No wheezing.   Abdominal:      General: There is no distension.      Palpations: Abdomen is soft.      Tenderness: There is no abdominal tenderness.   Musculoskeletal:         General: Signs of injury (bruising on multiple extremities. No evidence for any fractures or pain with movement) present. No deformity.     more sleepy today    Results Review:       I reviewed the patient's new clinical results.  Results from last 7 days   Lab Units 01/15/22  0815 22  1433   WBC 10*3/mm3 6.93 9.81   HEMOGLOBIN g/dL 13.6 13.2   PLATELETS 10*3/mm3 159 147     Results from last 7 days   Lab Units 01/15/22  0815 22  1433   SODIUM mmol/L 144 143   POTASSIUM mmol/L 3.5 3.6   CHLORIDE mmol/L 110* 106   CO2 mmol/L 23.5 29.0   BUN mg/dL 14 19   CREATININE mg/dL 0.79 0.81   GLUCOSE mg/dL 86 101*   Estimated Creatinine Clearance: 101.6 mL/min (by C-G formula based on SCr of 0.79 mg/dL).  Results from last 7 days   Lab Units 22  1433   ALBUMIN g/dL 4.00   BILIRUBIN mg/dL 1.1   ALK PHOS  U/L 99   AST (SGOT) U/L 74*   ALT (SGPT) U/L <5     Results from last 7 days   Lab Units 01/15/22  0815 01/14/22  1433   CALCIUM mg/dL 8.3* 8.6   ALBUMIN g/dL  --  4.00         Coag     HbA1C No results found for: HGBA1C  Infection     Radiology(recent) No radiology results for the last day  No results found for: TROPONINT, TROPONINI, BNP  No components found for: TSH;2    ascorbic acid, 500 mg, Oral, Daily  aspirin, 81 mg, Oral, Daily  atorvastatin, 20 mg, Oral, Daily  carbidopa-levodopa, 2 tablet, Oral, 4x Daily  cholecalciferol, 1,000 Units, Oral, Daily  memantine, 10 mg, Oral, Q12H  multivitamin with minerals, 1 tablet, Oral, Daily  pantoprazole, 40 mg, Oral, QAM  pramipexole, 0.75 mg, Oral, TID  rivastigmine, 6 mg, Oral, BID  vitamin B-12, 500 mcg, Oral, Daily       Diet Regular; Cardiac      Assessment/Plan      Active Hospital Problems    Diagnosis  POA   • **Parkinson's disease (HCC) [G20]  Yes   • GERD (gastroesophageal reflux disease) [K21.9]  Yes   • DNR (do not resuscitate) [Z66]  Yes   • Essential hypertension [I10]  Yes   • Generalized weakness [R53.1]  Yes      Resolved Hospital Problems   No resolved problems to display.       · Restarted agents for his Parkinson's. I think missing a few doses has made him more withdrawn and less active.  He has had falls but no evidence for any fracture or acute injury besides some bruising.  Also no evidence for infection or any significant electrolyte abnormalities.  Discussed the case with his physician children 1/15 and 1/16.  Seems to be mostly a progression of the patient's Parkinson's disease and that he has become more debilitated and that things are worse with missing a few doses of his Parkinson's medication.  · PPI, ASA  · PT, OT  · Labs reviewed  · Discussed code status- DNR/DNI per family. Son confirmed with his sister.    I also reviewed notes from documentation of his admission a couple of months ago.  At that time he did have MRI of the brain to rule  out acute stroke which was negative.  They did have neurology see who had increased his Sinemet to 4 times a day but that was about it.  Discussed with patient's son and daughter  do not feel additional neurology consultation would be of benefit at this time, and I do agree.      LEONOR RN  Dispo- to SNF when bed available      Jose Toscano MD  [facility]  01/17/22  14:29 EST

## 2022-01-18 NOTE — PLAN OF CARE
Goal Outcome Evaluation:              Outcome Summary: No complaints. Worked with PT. Up in chair most of the day. Meds crushed in applesauce and pudding. Waiting for placement.

## 2022-01-18 NOTE — PLAN OF CARE
Goal Outcome Evaluation:              Outcome Summary: Pt pleasantly confused; alert to self only, up with assist and on room air. Pt takes meds crushed in applesauce or pudding. Slept most of night. Plan to d/c to snf once bed becomes available. VSS will continue to monitor.

## 2022-01-18 NOTE — PLAN OF CARE
Goal Outcome Evaluation:  Plan of Care Reviewed With: patient        Progress: no change  Outcome Summary: Pleasantly confused. BP's lower at 97/63, NS bolus given. Meds crushed in applesauce or pudding. Room air, VSS will continue to monitor.

## 2022-01-18 NOTE — PROGRESS NOTES
Name: Rogelio Sher ADMIT: 2022   : 1945  PCP: Provider, No Known    MRN: [id number] LOS: 0 days   AGE/SEX: 76 y.o. male  ROOM: [facility]     Subjective   Subjective   Patient appears generally weak, lethargic, elderly, frail, relatively comfortable, no apparent distress. Discussed with RN. No overnight events. Tolerated medications this morning with applesauce.    Review of Systems   Reason unable to perform ROS: unable to ascertain 2/2 lethargy         Objective   Objective   Vital Signs  Temp:  [96.4 °F (35.8 °C)-97.9 °F (36.6 °C)] 97.9 °F (36.6 °C)  Heart Rate:  [60-70] 68  Resp:  [16-18] 16  BP: ()/(59-90) 97/63  SpO2:  [95 %-97 %] 95 %  on   ;   Device (Oxygen Therapy): room air  Body mass index is 27.78 kg/m².     Physical Exam  Constitutional:       General: He is not in acute distress.     Appearance: He is ill-appearing (chronic appearance). He is not toxic-appearing.      Comments: Lethargic, elderly, frail, generally weak   Cardiovascular:      Rate and Rhythm: Normal rate and regular rhythm.   Pulmonary:      Effort: Pulmonary effort is normal.      Comments: Diminished on expiration throughout all lung fields  Abdominal:      General: There is no distension.      Palpations: Abdomen is soft.      Tenderness: There is no abdominal tenderness.   Musculoskeletal:         General: No tenderness.      Right lower leg: No edema.      Left lower leg: No edema.   Skin:     General: Skin is warm and dry.   Psychiatric:      Comments: Cooperative with rudimentary commands (hand  albeit weak)         Results Review     I reviewed the patient's new clinical results.  Results from last 7 days   Lab Units 01/15/22  0815 22  1433   WBC 10*3/mm3 6.93 9.81   HEMOGLOBIN g/dL 13.6 13.2   PLATELETS 10*3/mm3 159 147     Results from last 7 days   Lab Units 01/15/22  0815 22  1433   SODIUM mmol/L 144 143   POTASSIUM mmol/L 3.5 3.6   CHLORIDE mmol/L 110* 106   CO2 mmol/L 23.5 29.0   BUN  mg/dL 14 19   CREATININE mg/dL 0.79 0.81   GLUCOSE mg/dL 86 101*   EGFR IF NONAFRICN AM mL/min/1.73 95 92     Results from last 7 days   Lab Units 01/14/22  1433   ALBUMIN g/dL 4.00   BILIRUBIN mg/dL 1.1   ALK PHOS U/L 99   AST (SGOT) U/L 74*   ALT (SGPT) U/L <5     Results from last 7 days   Lab Units 01/15/22  0815 01/14/22  1433   CALCIUM mg/dL 8.3* 8.6   ALBUMIN g/dL  --  4.00       COVID19   Date Value Ref Range Status   01/14/2022 Not Detected Not Detected - Ref. Range Final   11/10/2021 Not Detected Not Detected - Ref. Range Final     No results found for: HGBA1C, POCGLU    MRI Brain Without Contrast  Narrative: MRI BRAIN WITHOUT CONTRAST 11/14/2021     HISTORY: Weakness. Possible stroke.     Multiple noncontrast sagittal and axial images were obtained through the  brain.     The diffusion-weighted images show no evidence of acute infarction.  There is a small focus of restricted diffusion in the right mastoid air  cells.     FLAIR images show a few scattered areas of bright signal in the  bilateral cerebral white matter consistent with mild small vessel white  matter ischemic disease.  There is mild-to-moderate diffuse atrophy. A  somewhat lobular appearing T2 bright signal nodular appearing area seen  posterior to the left cerebellar hemisphere on T2 axial series 10 images  3 through 5. This is consistent with small intraosseous meningoceles and  likely of no significance.     There is a round T2 bright signal 1.4 cm nodule in the mastoid air cells  on the right. This demonstrates T1 low signal. This does demonstrate  some restricted diffusion with mild bright signal intensity on the  diffusion-weighted images. This is relatively well-circumscribed on the  CT scan of the brain. This could possibly represent chronic mastoid  fluid or less likely a cholesteatoma.     No intracranial hemorrhage is seen. No midline shift is seen.     The craniocervical junction and sella appear normal.     Impression: 1.  Mild  changes of bilateral small vessel white matter ischemic  disease.  2.  No evidence of acute infarction or hemorrhage.  3. There is a 1.4 cm T2 bright signal area in the right mastoid air  cells which demonstrates slight restricted diffusion on  diffusion-weighted images. This could represent chronic mastoid fluid.  Please correlate for signs of mastoiditis. Another possibility might be  cholesteatoma. If further evaluation, short-term follow-up CT scanning  of the brain or temporal bones may be helpful to assess stability.        This report was finalized on 11/15/2021 3:02 PM by Dr. Ho Lee M.D.       Scheduled Medications  ascorbic acid, 500 mg, Oral, Daily  aspirin, 81 mg, Oral, Daily  atorvastatin, 20 mg, Oral, Daily  carbidopa-levodopa, 2 tablet, Oral, 4x Daily  cholecalciferol, 1,000 Units, Oral, Daily  memantine, 10 mg, Oral, Q12H  multivitamin with minerals, 1 tablet, Oral, Daily  pantoprazole, 40 mg, Oral, QAM  pramipexole, 0.75 mg, Oral, TID  rivastigmine, 6 mg, Oral, BID  vitamin B-12, 500 mcg, Oral, Daily    Infusions   Diet  Diet Regular; Cardiac       Assessment/Plan     Active Hospital Problems    Diagnosis  POA   • **Parkinson's disease (HCC) [G20]  Yes   • GERD (gastroesophageal reflux disease) [K21.9]  Yes   • DNR (do not resuscitate) [Z66]  Yes   • Essential hypertension [I10]  Yes   • Generalized weakness [R53.1]  Yes      Resolved Hospital Problems   No resolved problems to display.       76 y.o. male admitted with Parkinson's disease (HCC).    Blood pressure is labile and not currently on antihypertensive agents. Reportedly tolerating p.o. per RN. Ordering IV normal saline 250 mL once & monitor BP for changes.  Ordering incentive spirometry given increased risk for atelectasis & complications thereof to include pneumonia.  Encourage cough, deep breathe.      Parkinson's agents restarted and suspected recent missed doses contributing to withdrawn and less active behavior. Aspiration  precautions.  UA negative.  WBC 6.     Recent falls without evidence of fracture or acute injury with the exception of ecchymosis. Labs unremarkable. PT following. Plan SNF.    *Guarded prognosis    · SCD for DVT prophylaxis.  · No CPR  · Discussed with Dr. Morales.   · Anticipate discharge pending SNF bed availability / CCP following      ELIAZAR Raygoza  [facility]  01/18/22  13:50 EST

## 2022-01-19 PROCEDURE — G0378 HOSPITAL OBSERVATION PER HR: HCPCS

## 2022-01-19 RX ADMIN — CARBIDOPA AND LEVODOPA 2 TABLET: 25; 100 TABLET ORAL at 09:25

## 2022-01-19 RX ADMIN — Medication 1000 UNITS: at 09:25

## 2022-01-19 RX ADMIN — ATORVASTATIN CALCIUM 20 MG: 20 TABLET, FILM COATED ORAL at 09:25

## 2022-01-19 RX ADMIN — MULTIPLE VITAMINS W/ MINERALS TAB 1 TABLET: TAB at 09:25

## 2022-01-19 RX ADMIN — RIVASTIGMINE TARTRATE 6 MG: 1.5 CAPSULE ORAL at 09:25

## 2022-01-19 RX ADMIN — MEMANTINE HYDROCHLORIDE 10 MG: 10 TABLET, FILM COATED ORAL at 20:42

## 2022-01-19 RX ADMIN — SODIUM CHLORIDE, PRESERVATIVE FREE 10 ML: 5 INJECTION INTRAVENOUS at 20:42

## 2022-01-19 RX ADMIN — ASPIRIN 81 MG: 81 TABLET, CHEWABLE ORAL at 09:25

## 2022-01-19 RX ADMIN — CARBIDOPA AND LEVODOPA 2 TABLET: 25; 100 TABLET ORAL at 20:42

## 2022-01-19 RX ADMIN — PRAMIPEXOLE DIHYDROCHLORIDE 0.75 MG: 0.5 TABLET ORAL at 17:03

## 2022-01-19 RX ADMIN — Medication 500 MCG: at 09:25

## 2022-01-19 RX ADMIN — CARBIDOPA AND LEVODOPA 2 TABLET: 25; 100 TABLET ORAL at 17:03

## 2022-01-19 RX ADMIN — CARBIDOPA AND LEVODOPA 2 TABLET: 25; 100 TABLET ORAL at 11:55

## 2022-01-19 RX ADMIN — OXYCODONE HYDROCHLORIDE AND ACETAMINOPHEN 500 MG: 500 TABLET ORAL at 09:25

## 2022-01-19 RX ADMIN — PRAMIPEXOLE DIHYDROCHLORIDE 0.75 MG: 0.5 TABLET ORAL at 20:42

## 2022-01-19 RX ADMIN — MEMANTINE HYDROCHLORIDE 10 MG: 10 TABLET, FILM COATED ORAL at 09:25

## 2022-01-19 RX ADMIN — PRAMIPEXOLE DIHYDROCHLORIDE 0.75 MG: 0.5 TABLET ORAL at 09:25

## 2022-01-19 RX ADMIN — RIVASTIGMINE TARTRATE 6 MG: 1.5 CAPSULE ORAL at 20:42

## 2022-01-19 NOTE — PLAN OF CARE
Goal Outcome Evaluation:  Plan of Care Reviewed With: patient        Progress: no change  Outcome Summary: Pt is confused. Meds crushed in pudding. Regular diet. Room air, assist x2 with walker and gait belt. VSS, will continue to monitor.

## 2022-01-19 NOTE — DISCHARGE SUMMARY
Patient Name: Rogelio Sher  : 1945  MRN: [id number]    Date of Admission: 2022  Date of Discharge:  2022  Primary Care Physician: Provider, No Known      Chief Complaint:   Fall      Discharge Diagnoses     Active Hospital Problems    Diagnosis  POA   • **Parkinson's disease (HCC) [G20]  Yes   • GERD (gastroesophageal reflux disease) [K21.9]  Yes   • DNR (do not resuscitate) [Z66]  Yes   • Essential hypertension [I10]  Yes   • Generalized weakness [R53.1]  Yes      Resolved Hospital Problems   No resolved problems to display.        Hospital Course     Mr. Sher is a 76 y.o. male with a history of HTN, HLD, GERD, Parkinson's disease, kidney stone who presented to [facility] initially complaining of fall.  Please see the admitting history and physical for further details.  He was found to have generalized weakness most likely due to deconditioning & complications of recently undertreated Parkison's disease given reports of multiple missed doses and was admitted to the hospital for further evaluation and treatment.      Restarted agents for his Parkinson's disease & suspect missed doses contributing to more withdrawn and less active behavior.  No evidence of fracture or acute injury with exception of minimal eccyhmosis.  Also no evidence for infection or any significant electrolyte abnormalities.  Family involved in decision-making during hospital admission.  suspect mostly progression of the Parkinson's disease contributing to increased debilitated state aggravated by missing medication therapy.      Review of notes from documentation a couple of months ago indicate no evidence of acute abnormality of MRI brain at that time & remains non-focal on exam.  Neurology increased Sinemet to 4 times a daily at that time.  Family not interested in additional neurological evaluation this admission & prefer return to [place] with 24/7 caregivers until bed available at [place]  at [facility].  Patient medically stable for discharge on 1/19 & tolerating P.O. & minimal physical activity with assistance.     Day of Discharge       Physical Exam:  Temp:  [97.3 °F (36.3 °C)-98.4 °F (36.9 °C)] 97.4 °F (36.3 °C)  Heart Rate:  [60-72] 64  Resp:  [16-18] 17  BP: (106-173)/(61-82) 133/70  Body mass index is 27.78 kg/m².     Physical Exam   Constitutional:       General: He is not in acute distress.     Appearance: He is ill-appearing (chronic appearance). He is not toxic-appearing.      Comments: Alert, elderly, frail, generally weak   Cardiovascular:      Rate and Rhythm: Normal rate and regular rhythm.   Pulmonary:      Effort: Pulmonary effort is normal.      Comments: Diminished on expiration throughout all lung fields  Abdominal:      General: There is no distension.      Palpations: Abdomen is soft.      Tenderness: There is no abdominal tenderness.   Musculoskeletal:         General: No tenderness.      Right lower leg: No edema.      Left lower leg: No edema.   Skin:     General: Skin is warm and dry.   Psychiatric:      Comments: Cooperative with rudimentary commands (hand  albeit weak)     Consultants     Consult Orders (all) (From admission, onward)     Start     Ordered    01/14/22 1654  Inpatient Case Management  Consult  Once        Provider:  (Not yet assigned)    01/14/22 1654    01/14/22 1509  [facility] (on-call MD unless specified) Details  Once        Specialty:  Hospitalist  Provider:  Ibis Woo MD    01/14/22 1508              Procedures     * Surgery not found *      Imaging Results (All)     None            Pertinent Labs     Results from last 7 days   Lab Units 01/15/22  0815 01/14/22  1433   WBC 10*3/mm3 6.93 9.81   HEMOGLOBIN g/dL 13.6 13.2   PLATELETS 10*3/mm3 159 147     Results from last 7 days   Lab Units 01/15/22  0815 01/14/22  1433   SODIUM mmol/L 144 143   POTASSIUM mmol/L 3.5 3.6   CHLORIDE mmol/L 110* 106   CO2 mmol/L 23.5 29.0   BUN  mg/dL 14 19   CREATININE mg/dL 0.79 0.81   GLUCOSE mg/dL 86 101*   EGFR IF NONAFRICN AM mL/min/1.73 95 92     Results from last 7 days   Lab Units 01/14/22  1433   ALBUMIN g/dL 4.00   BILIRUBIN mg/dL 1.1   ALK PHOS U/L 99   AST (SGOT) U/L 74*   ALT (SGPT) U/L <5     Results from last 7 days   Lab Units 01/15/22  0815 01/14/22  1433   CALCIUM mg/dL 8.3* 8.6   ALBUMIN g/dL  --  4.00               Invalid input(s): LDLCALC      Results from last 7 days   Lab Units 01/14/22  1435   COVID19  Not Detected       Test Results Pending at Discharge       Discharge Details        Discharge Medications      Changes to Medications      Instructions Start Date   carbidopa-levodopa  MG per tablet  Commonly known as: SINEMET  What changed:   · how much to take  · when to take this   1 tablet, Oral, Every 6 Hours Scheduled         Continue These Medications      Instructions Start Date   aspirin 81 MG EC tablet   81 mg, Oral, Daily      atorvastatin 20 MG tablet  Commonly known as: LIPITOR   20 mg, Oral, Daily      cholecalciferol 25 MCG (1000 UT) tablet  Commonly known as: VITAMIN D3   1,000 Units, Oral, Daily      memantine 10 MG tablet  Commonly known as: NAMENDA   10 mg, Oral, 2 Times Daily      multivitamin with minerals tablet tablet   1 tablet, Oral, Daily      omeprazole 20 MG capsule  Commonly known as: priLOSEC   20 mg, Oral, Daily      pramipexole 0.75 MG tablet  Commonly known as: MIRAPEX   0.7 mg, Oral, 3 Times Daily      rivastigmine 6 MG capsule  Commonly known as: EXELON   6 mg, Oral, 2 Times Daily      vitamin B-12 500 MCG tablet  Commonly known as: CYANOCOBALAMIN   500 mcg, Oral, Daily      vitamin C 500 MG tablet  Commonly known as: ASCORBIC ACID   500 mg, Oral, Daily             No Known Allergies    Discharge Disposition:  Skilled Nursing Facility (DC - External)      Discharge Diet:  Diet Order   Procedures   • Diet Regular; Cardiac       Discharge Activity:       CODE STATUS:    Code Status and Medical  Interventions:   Ordered at: 01/15/22 1014     Medical Intervention Limits:    NO intubation (DNI)    NO cardioversion     Level Of Support Discussed With:    Health Care Surrogate     Code Status (Patient has no pulse and is not breathing):    No CPR (Do Not Attempt to Resuscitate)     Medical Interventions (Patient has pulse or is breathing):    Limited Support     Comments:    discussed with family       No future appointments.   Follow-up Information     Provider, No Known .    Contact information:  [facility] [address] [phone]                         Time Spent on Discharge:  Greater than 30 minutes      ELIAZAR Raygoza  [facility]  01/19/22  12:36 EST

## 2022-01-19 NOTE — PROGRESS NOTES
Name: Rogelio Sher ADMIT: 2022   : 1945  PCP: Provider, No Known    MRN: [id number] LOS: 0 days   AGE/SEX: 76 y.o. male  ROOM: [facility]     Subjective   Subjective   Patient appears generally weak, awake, elderly, frail, relatively comfortable, no apparent distress. Discussed with RN. No overnight events. Tolerated medications & eating breakfast at present.    Review of Systems   Reason unable to perform ROS: unable to ascertain 2/2 suspected dementia.   Respiratory: Negative for cough and shortness of breath.    Cardiovascular: Negative for chest pain and leg swelling.        Objective   Objective   Vital Signs  Temp:  [97.3 °F (36.3 °C)-98.4 °F (36.9 °C)] 97.4 °F (36.3 °C)  Heart Rate:  [60-72] 64  Resp:  [16-18] 17  BP: (106-173)/(61-82) 133/70  SpO2:  [96 %-100 %] 97 %  on   ;   Device (Oxygen Therapy): room air  Body mass index is 27.78 kg/m².     Physical Exam  Constitutional:       General: He is not in acute distress.     Appearance: He is ill-appearing (chronic appearance). He is not toxic-appearing.      Comments:  elderly, frail, generally weak   Cardiovascular:      Rate and Rhythm: Normal rate and regular rhythm.   Pulmonary:      Effort: Pulmonary effort is normal.      Comments: Diminished on expiration throughout all lung fields  Abdominal:      General: There is no distension.      Palpations: Abdomen is soft.      Tenderness: There is no abdominal tenderness.   Musculoskeletal:         General: No tenderness.      Right lower leg: No edema.      Left lower leg: No edema.   Skin:     General: Skin is warm and dry.   Neurological:      General: No focal deficit present.      Mental Status: Mental status is at baseline.      Motor: Weakness present.   Psychiatric:      Comments: Cooperative with rudimentary commands (hand  albeit weak)         Results Review     I reviewed the patient's new clinical results.  Results from last 7 days   Lab Units 01/15/22  0815 22  2208    WBC 10*3/mm3 6.93 9.81   HEMOGLOBIN g/dL 13.6 13.2   PLATELETS 10*3/mm3 159 147     Results from last 7 days   Lab Units 01/15/22  0815 01/14/22  1433   SODIUM mmol/L 144 143   POTASSIUM mmol/L 3.5 3.6   CHLORIDE mmol/L 110* 106   CO2 mmol/L 23.5 29.0   BUN mg/dL 14 19   CREATININE mg/dL 0.79 0.81   GLUCOSE mg/dL 86 101*   EGFR IF NONAFRICN AM mL/min/1.73 95 92     Results from last 7 days   Lab Units 01/14/22  1433   ALBUMIN g/dL 4.00   BILIRUBIN mg/dL 1.1   ALK PHOS U/L 99   AST (SGOT) U/L 74*   ALT (SGPT) U/L <5     Results from last 7 days   Lab Units 01/15/22  0815 01/14/22  1433   CALCIUM mg/dL 8.3* 8.6   ALBUMIN g/dL  --  4.00       COVID19   Date Value Ref Range Status   01/14/2022 Not Detected Not Detected - Ref. Range Final   11/10/2021 Not Detected Not Detected - Ref. Range Final     No results found for: HGBA1C, POCGLU    MRI Brain Without Contrast  Narrative: MRI BRAIN WITHOUT CONTRAST 11/14/2021     HISTORY: Weakness. Possible stroke.     Multiple noncontrast sagittal and axial images were obtained through the  brain.     The diffusion-weighted images show no evidence of acute infarction.  There is a small focus of restricted diffusion in the right mastoid air  cells.     FLAIR images show a few scattered areas of bright signal in the  bilateral cerebral white matter consistent with mild small vessel white  matter ischemic disease.  There is mild-to-moderate diffuse atrophy. A  somewhat lobular appearing T2 bright signal nodular appearing area seen  posterior to the left cerebellar hemisphere on T2 axial series 10 images  3 through 5. This is consistent with small intraosseous meningoceles and  likely of no significance.     There is a round T2 bright signal 1.4 cm nodule in the mastoid air cells  on the right. This demonstrates T1 low signal. This does demonstrate  some restricted diffusion with mild bright signal intensity on the  diffusion-weighted images. This is relatively well-circumscribed on  the  CT scan of the brain. This could possibly represent chronic mastoid  fluid or less likely a cholesteatoma.     No intracranial hemorrhage is seen. No midline shift is seen.     The craniocervical junction and sella appear normal.     Impression: 1.  Mild changes of bilateral small vessel white matter ischemic  disease.  2.  No evidence of acute infarction or hemorrhage.  3. There is a 1.4 cm T2 bright signal area in the right mastoid air  cells which demonstrates slight restricted diffusion on  diffusion-weighted images. This could represent chronic mastoid fluid.  Please correlate for signs of mastoiditis. Another possibility might be  cholesteatoma. If further evaluation, short-term follow-up CT scanning  of the brain or temporal bones may be helpful to assess stability.        This report was finalized on 11/15/2021 3:02 PM by Dr. Ho Lee M.D.       Scheduled Medications  ascorbic acid, 500 mg, Oral, Daily  aspirin, 81 mg, Oral, Daily  atorvastatin, 20 mg, Oral, Daily  carbidopa-levodopa, 2 tablet, Oral, 4x Daily  cholecalciferol, 1,000 Units, Oral, Daily  memantine, 10 mg, Oral, Q12H  multivitamin with minerals, 1 tablet, Oral, Daily  pantoprazole, 40 mg, Oral, QAM  pramipexole, 0.75 mg, Oral, TID  rivastigmine, 6 mg, Oral, BID  vitamin B-12, 500 mcg, Oral, Daily    Infusions   Diet  Diet Regular; Cardiac       Assessment/Plan     Active Hospital Problems    Diagnosis  POA   • **Parkinson's disease (HCC) [G20]  Yes   • GERD (gastroesophageal reflux disease) [K21.9]  Yes   • DNR (do not resuscitate) [Z66]  Yes   • Essential hypertension [I10]  Yes   • Generalized weakness [R53.1]  Yes      Resolved Hospital Problems   No resolved problems to display.       76 y.o. male admitted with Parkinson's disease (HCC).    Blood pressure is labile and not currently on antihypertensive agents. Reportedly tolerating p.o. per RN. S/p IV normal saline 250 mL once & monitor BP for changes.  Incentive spirometry  given increased risk for atelectasis & complications thereof to include pneumonia.  Encourage cough, deep breathe.      Parkinson's agents restarted and suspected recent missed doses contributing to withdrawn and less active behavior. Aspiration precautions.  UA negative.  WBC 6.     Recent falls without evidence of fracture or acute injury with the exception of ecchymosis. Labs unremarkable. PT following. Plan SNF.    · SCD for DVT prophylaxis.  · No CPR  · Discussed with Dr. Morales.   · Medically stable for DC on 1/19; however, family unable to acquire 24/7 caregivers at SNF as facility will not receive patient without 24/7 caregiver per CCP following       ELIAZAR Raygoza  [facility]  01/19/22  14:57 EST

## 2022-01-19 NOTE — PROGRESS NOTES
"Adult Nutrition  Assessment/PES    Patient Name:  Rogelio Sher  YOB: 1945  MRN: [id number]  Admit Date:  1/14/2022    Assessment Date:  1/19/2022  Nutrition assessment.   Reason for Assessment     Row Name 01/19/22 1020          Reason for Assessment    Reason For Assessment identified at risk by screening criteria     Diagnosis  Parkinson's, GERD, HTN     Identified At Risk by Screening Criteria MST SCORE 2+                Nutrition/Diet History     Row Name 01/19/22 1020          Nutrition/Diet History    Typical Food/Fluid Intake cardiac diet, consumed 100% dinner last night; boost ordered TID; : Pleasantly confused                Anthropometrics     Row Name 01/19/22 1020          Anthropometrics    Height 182.9 cm (72\")            Admit Weight    Admit Weight 92.9 kg (204 lb 12.9 oz)            Ideal Body Weight (IBW)    Ideal Body Weight (IBW) (kg) 82.07     % of Ideal Body Weight Assessment --  113%            Body Mass Index (BMI)    BMI Assessment BMI 25-29.9: overweight  BMI-27.7                Labs/Tests/Procedures/Meds     Row Name 01/19/22 1021          Labs/Procedures/Meds    Lab Results Reviewed reviewed, pertinent            Diagnostic Tests/Procedures    Diagnostic Test/Procedure Reviewed reviewed, pertinent            Medications    Pertinent Medications Reviewed reviewed, pertinent                  Estimated/Assessed Needs     Row Name 01/19/22 1020          Calculation Measurements    Height 182.9 cm (72\")                Nutrition Prescription Ordered     Row Name 01/19/22 1021          Nutrition Prescription PO    Supplement Boost Plus (Ensure Enlive, Ensure Plus)     Supplement Frequency 3 times a day     Common Modifiers Cardiac                Evaluation of Received Nutrient/Fluid Intake     Row Name 01/19/22 1021          PO Evaluation    Number of Meals 1     % PO Intake 100                     Problem/Interventions:   Problem 1     Row Name 01/19/22 1022          " Nutrition Diagnoses Problem 1    Problem 1 Nutrition Appropriate for Condition at this Time                      Intervention Goal     Row Name 01/19/22 1022          Intervention Goal    General Maintain nutrition; Reduce/improve symptoms; Meet nutritional needs for age/condition     PO Tolerate PO; Maintain intake     Weight Maintain weight                Nutrition Intervention     Row Name 01/19/22 1022          Nutrition Intervention    RD/Tech Action Care plan reviewd; Follow Tx progress; Supplement provided                Nutrition Prescription     Row Name 01/19/22 1022          Nutrition Prescription PO    PO Prescription Begin/change supplement     Supplement Boost     Supplement Frequency 3 times a day     New PO Prescription Ordered? Yes                Education/Evaluation     Row Name 01/19/22 1023          Education    Education Will Instruct as appropriate            Monitor/Evaluation    Monitor Per protocol                 Electronically signed by:  Key Peterson RD  01/19/22 10:26 EST

## 2022-01-19 NOTE — CASE MANAGEMENT/SOCIAL WORK
Continued Stay Note  [facility]     Patient Name: Rogelio Sher  MRN: [id number]  Today's Date: 1/19/2022    Admit Date: 1/14/2022     Discharge Plan     Row Name 01/19/22 1231       Plan    Plan Return [facility] with 24/7 caregivers until bed available at [facility].    Plan Comments Patient is medically ready for discharge, but issues with placement. Multiple conversations with Lisa with [facility], they will have a SNF memory care bed, sometime next week. After rehab at [facility] patient will transition to long-term care there. Spoke with Susan Ferreira/daughter [phone], informed her that [facility] [place] will not have a bed until next week, discussed the option of patient going back to [facility] until a bed can be secured at [facility], she was agreeable.  Spoke with Radha [phone] director of memory care at [facility], she said patient could return but would need 24/7 caregivers.  Family will need to arrange for private caregivers.  Again, spoke with Susan chowdary, she was agreeable to plan of returning to [facility] until bed open at [facility].  Daughter will work on securing 24/7 caregivers. Will continue to follow for new or changing discharge needs.               Discharge Codes    No documentation.               Expected Discharge Date and Time     Expected Discharge Date Expected Discharge Time    Jan 20, 2022             Gerri Burns RN

## 2022-01-20 LAB
ANION GAP SERPL CALCULATED.3IONS-SCNC: 7.1 MMOL/L (ref 5–15)
BUN SERPL-MCNC: 13 MG/DL (ref 8–23)
BUN/CREAT SERPL: 16 (ref 7–25)
CALCIUM SPEC-SCNC: 8.4 MG/DL (ref 8.6–10.5)
CHLORIDE SERPL-SCNC: 105 MMOL/L (ref 98–107)
CO2 SERPL-SCNC: 27.9 MMOL/L (ref 22–29)
CREAT SERPL-MCNC: 0.81 MG/DL (ref 0.76–1.27)
GFR SERPL CREATININE-BSD FRML MDRD: 92 ML/MIN/1.73
GLUCOSE SERPL-MCNC: 94 MG/DL (ref 65–99)
POTASSIUM SERPL-SCNC: 3.9 MMOL/L (ref 3.5–5.2)
SODIUM SERPL-SCNC: 140 MMOL/L (ref 136–145)

## 2022-01-20 PROCEDURE — G0378 HOSPITAL OBSERVATION PER HR: HCPCS

## 2022-01-20 PROCEDURE — 97110 THERAPEUTIC EXERCISES: CPT

## 2022-01-20 PROCEDURE — 80048 BASIC METABOLIC PNL TOTAL CA: CPT | Performed by: NURSE PRACTITIONER

## 2022-01-20 PROCEDURE — 97110 THERAPEUTIC EXERCISES: CPT | Performed by: OCCUPATIONAL THERAPIST

## 2022-01-20 RX ADMIN — Medication 500 MCG: at 09:01

## 2022-01-20 RX ADMIN — MULTIPLE VITAMINS W/ MINERALS TAB 1 TABLET: TAB at 09:01

## 2022-01-20 RX ADMIN — PRAMIPEXOLE DIHYDROCHLORIDE 0.75 MG: 0.5 TABLET ORAL at 09:01

## 2022-01-20 RX ADMIN — CARBIDOPA AND LEVODOPA 2 TABLET: 25; 100 TABLET ORAL at 16:13

## 2022-01-20 RX ADMIN — PRAMIPEXOLE DIHYDROCHLORIDE 0.75 MG: 0.5 TABLET ORAL at 20:31

## 2022-01-20 RX ADMIN — CARBIDOPA AND LEVODOPA 2 TABLET: 25; 100 TABLET ORAL at 20:31

## 2022-01-20 RX ADMIN — MEMANTINE HYDROCHLORIDE 10 MG: 10 TABLET, FILM COATED ORAL at 20:31

## 2022-01-20 RX ADMIN — RIVASTIGMINE TARTRATE 6 MG: 1.5 CAPSULE ORAL at 09:01

## 2022-01-20 RX ADMIN — CARBIDOPA AND LEVODOPA 2 TABLET: 25; 100 TABLET ORAL at 09:01

## 2022-01-20 RX ADMIN — Medication 1000 UNITS: at 09:01

## 2022-01-20 RX ADMIN — ATORVASTATIN CALCIUM 20 MG: 20 TABLET, FILM COATED ORAL at 09:01

## 2022-01-20 RX ADMIN — PRAMIPEXOLE DIHYDROCHLORIDE 0.75 MG: 0.5 TABLET ORAL at 16:13

## 2022-01-20 RX ADMIN — RIVASTIGMINE TARTRATE 6 MG: 1.5 CAPSULE ORAL at 20:31

## 2022-01-20 RX ADMIN — CARBIDOPA AND LEVODOPA 2 TABLET: 25; 100 TABLET ORAL at 11:00

## 2022-01-20 RX ADMIN — MEMANTINE HYDROCHLORIDE 10 MG: 10 TABLET, FILM COATED ORAL at 09:01

## 2022-01-20 RX ADMIN — ASPIRIN 81 MG: 81 TABLET, CHEWABLE ORAL at 09:01

## 2022-01-20 RX ADMIN — OXYCODONE HYDROCHLORIDE AND ACETAMINOPHEN 500 MG: 500 TABLET ORAL at 09:01

## 2022-01-20 NOTE — THERAPY TREATMENT NOTE
Patient Name: Rogelio Sher  : 1945    MRN: [id number]                              Today's Date: 2022       Admit Date: 2022    Visit Dx:     ICD-10-CM ICD-9-CM   1. Parkinson's disease (HCC)  G20 332.0   2. Fall, initial encounter  W19.XXXA E888.9   3. Failure to thrive in adult  R62.7 783.7     Patient Active Problem List   Diagnosis   • Generalized weakness   • Parkinson's disease (HCC)   • Essential hypertension   • Influenza A   • GERD (gastroesophageal reflux disease)   • DNR (do not resuscitate)     Past Medical History:   Diagnosis Date   • Chorioretinitis     histoplasmosis left eye   • GERD (gastroesophageal reflux disease)    • Hyperlipidemia    • Hypertension    • Kidney stone    • Parkinson's disease (HCC)      Past Surgical History:   Procedure Laterality Date   • CATARACT EXTRACTION, BILATERAL     • COLONOSCOPY     • KIDNEY STONE SURGERY     • TONSILLECTOMY        General Information     Row Name 22 1414          Physical Therapy Time and Intention    Document Type therapy note (daily note)  -     Mode of Treatment physical therapy  -     Row Name 22 1414          General Information    Existing Precautions/Restrictions fall  -     Row Name 22 1414          Safety Issues, Functional Mobility    Impairments Affecting Function (Mobility) balance; cognition; range of motion (ROM); postural/trunk control  -     Cognitive Impairments, Mobility Safety/Performance insight into deficits/self-awareness; judgment; problem-solving/reasoning; safety precaution awareness; safety precaution follow-through; sequencing abilities  -           User Key  (r) = Recorded By, (t) = Taken By, (c) = Cosigned By    Initials Name Provider Type     Cyndie Bean PTA Physical Therapy Assistant               Mobility     Row Name 22 1415          Bed Mobility    Comment (Bed Mobility) Pt standing in room w/ family member at beg of session; pt took a few steps  backwards then sat back down in chair  -PH     Row Name 01/20/22 1415          Transfers    Comment (Transfers) vc for B hand placement  -PH     Row Name 01/20/22 1415          Sit-Stand Transfer    Sit-Stand [place] (Transfers) contact guard; verbal cues  -PH     Assistive Device (Sit-Stand Transfers) walker, front-wheeled  -PH     Row Name 01/20/22 1415          Gait/Stairs (Locomotion)    [place] Level (Gait) contact guard; verbal cues  -PH     Assistive Device (Gait) walker, front-wheeled  -PH     Distance in Feet (Gait) 400'  -PH     Deviations/Abnormal Patterns (Gait) odalys decreased; gait speed decreased; stride length decreased; base of support, narrow; festinating/shuffling; scissoring  -PH     Bilateral Gait Deviations forward flexed posture; heel strike decreased  -PH     [place] Level (Stairs) unable to assess  -PH     Comment (Gait/Stairs) Pt distracted by env and attempting steer fww in directions he was gazing. Assist for fww mgmt given. Pt w/ narrower BOUBACAR toward end of gait w/ vc to correct. Pt letting go of fww a few times while talking and cued to keep B hands on fww for safety.  -PH           User Key  (r) = Recorded By, (t) = Taken By, (c) = Cosigned By    Initials Name Provider Type    PH Cyndie Bean PTA Physical Therapy Assistant               Obj/Interventions     Row Name 01/20/22 1418          Motor Skills    Therapeutic Exercise other (see comments)  BAP, LAQ, seated march; x 15 reps all; vc/tc to incr ROM  -PH     Row Name 01/20/22 1418          Balance    Balance Assessment sitting static balance; standing static balance  -PH     Static Sitting Balance WFL; unsupported; sitting in chair  -PH     Static Standing Balance WFL; supported; standing  -PH           User Key  (r) = Recorded By, (t) = Taken By, (c) = Cosigned By    Initials Name Provider Type    PH Cyndie Bean PTA Physical Therapy Assistant               Goals/Plan    No documentation.                 Clinical Impression     Row Name 01/20/22 1419          Pain    Additional Documentation Pain Scale: Numbers Pre/Post-Treatment (Group)  -PH     Row Name 01/20/22 1419          Pain Scale: Numbers Pre/Post-Treatment    Pretreatment Pain Rating 0/10 - no pain  -PH     Posttreatment Pain Rating 0/10 - no pain  -PH     Row Name 01/20/22 1419          Plan of Care Review    Plan of Care Reviewed With patient; family  -PH     Progress improving  -PH     Outcome Summary Pt more alert and generally making improvements w/ PT although impulsive. Pt amb 400' req CGA and use of fww. Family member following w/ chair. Pt distracted by env and steering in direction he was looking. Pt also removing hands from fww  at times. pt educ a to keep hands on fww for safety. Pt returned to room and performed a few ther ex w/ vc/tc to in ROM. PT will prog as pt cuauhtemoc.  -PH     Row Name 01/20/22 1419          Vital Signs    O2 Delivery Pre Treatment room air  -PH     O2 Delivery Intra Treatment room air  -PH     O2 Delivery Post Treatment room air  -PH     Row Name 01/20/22 1419          Positioning and Restraints    Pre-Treatment Position standing in room  -PH     Post Treatment Position chair  -PH     In Chair sitting; encouraged to call for assist; exit alarm on; call light within reach; with family/caregiver  -PH           User Key  (r) = Recorded By, (t) = Taken By, (c) = Cosigned By    Initials Name Provider Type    PH Cydnie Bean PTA Physical Therapy Assistant               Outcome Measures     Row Name 01/20/22 1422          How much help from another person do you currently need...    Turning from your back to your side while in flat bed without using bedrails? 3  -PH     Moving from lying on back to sitting on the side of a flat bed without bedrails? 3  -PH     Moving to and from a bed to a chair (including a wheelchair)? 3  -PH     Standing up from a chair using your arms (e.g., wheelchair, bedside  chair)? 3  -PH     Climbing 3-5 steps with a railing? 3  -PH     To walk in hospital room? 3  -PH     AM-PAC 6 Clicks Score (PT) 18  -PH     Row Name 01/20/22 1422          Functional Assessment    Outcome Measure Options AM-PAC 6 Clicks Basic Mobility (PT)  -           User Key  (r) = Recorded By, (t) = Taken By, (c) = Cosigned By    Initials Name Provider Type     Cyndie Bean PTA Physical Therapy Assistant                             Physical Therapy Education                 Title: PT OT SLP Therapies (In Progress)     Topic: Physical Therapy (Done)     Point: Mobility training (Done)     Learning Progress Summary           Patient Acceptance, E,D, VU,NR by  at 1/20/2022 1422    Acceptance, E,D, VU by  at 1/17/2022 1524    Acceptance, E,TB,D, VU,DU,NR by  at 1/15/2022 1625                   Point: Home exercise program (Done)     Learning Progress Summary           Patient Acceptance, E,D, VU,NR by  at 1/20/2022 1422    Acceptance, E,D, VU by  at 1/17/2022 1524                   Point: Body mechanics (Done)     Learning Progress Summary           Patient Acceptance, E,D, VU,NR by  at 1/20/2022 1422    Acceptance, E,D, VU by  at 1/17/2022 1524                   Point: Precautions (Done)     Learning Progress Summary           Patient Acceptance, E,D, VU,NR by  at 1/20/2022 1422    Acceptance, E,D, VU by  at 1/17/2022 1524    Acceptance, E,TB,D, VU,DU,NR by  at 1/15/2022 1625                               User Key     Initials Effective Dates Name Provider Type [facility] 06/16/21 -  Cyndie Bean PTA Physical Therapy Assistant PT     05/26/20 -  Radha Beckford PT Physical Therapist PT              PT Recommendation and Plan     Plan of Care Reviewed With: patient, family  Progress: improving  Outcome Summary: Pt more alert and generally making improvements w/ PT although impulsive. Pt amb 400' req CGA and use of fww. Family member following w/ chair. Pt  distracted by env and steering in direction he was looking. Pt also removing hands from fww  at times. pt educ a to keep hands on fww for safety. Pt returned to room and performed a few ther ex w/ vc/tc to in ROM. PT will prog as pt cuauhtemoc.     Time Calculation:    PT Charges     Row Name 01/20/22 1424             Time Calculation    Start Time 1258  -PH      Stop Time 1322  -PH      Time Calculation (min) 24 min  -PH      PT Received On 01/20/22  -PH      PT - Next Appointment 01/22/22  -PH              Timed Charges    45570 - PT Therapeutic Exercise Minutes 6  -PH      57950 - PT Therapeutic Activity Minutes 18  -PH              Total Minutes    Timed Charges Total Minutes 24  -PH       Total Minutes 24  -PH            User Key  (r) = Recorded By, (t) = Taken By, (c) = Cosigned By    Initials Name Provider Type    PH Cyndie Bean PTA Physical Therapy Assistant              Therapy Charges for Today     Code Description Service Date Service Provider Modifiers Qty    37010065649 HC PT THER PROC EA 15 MIN 1/20/2022 Cyndie Bean PTA GP 2          PT G-Codes  Outcome Measure Options: AM-PAC 6 Clicks Basic Mobility (PT)  AM-PAC 6 Clicks Score (PT): 18  AM-PAC 6 Clicks Score (OT): 9    Cyndie Bean PTA  1/20/2022

## 2022-01-20 NOTE — THERAPY TREATMENT NOTE
Patient Name: Rogelio Sher  : 1945    MRN: [id number]                              Today's Date: 2022       Admit Date: 2022    Visit Dx:     ICD-10-CM ICD-9-CM   1. Parkinson's disease (HCC)  G20 332.0   2. Fall, initial encounter  W19.XXXA E888.9   3. Failure to thrive in adult  R62.7 783.7     Patient Active Problem List   Diagnosis   • Generalized weakness   • Parkinson's disease (HCC)   • Essential hypertension   • Influenza A   • GERD (gastroesophageal reflux disease)   • DNR (do not resuscitate)     Past Medical History:   Diagnosis Date   • Chorioretinitis     histoplasmosis left eye   • GERD (gastroesophageal reflux disease)    • Hyperlipidemia    • Hypertension    • Kidney stone    • Parkinson's disease (HCC)      Past Surgical History:   Procedure Laterality Date   • CATARACT EXTRACTION, BILATERAL     • COLONOSCOPY     • KIDNEY STONE SURGERY     • TONSILLECTOMY        General Information     Row Name 22 1541          OT Time and Intention    Document Type therapy note (daily note)  -     Mode of Treatment individual therapy; occupational therapy  -     Row Name 22 1541          General Information    Existing Precautions/Restrictions fall  -     Row Name 22 1541          Cognition    Orientation Status (Cognition) oriented to; person  -     Row Name 22 1541          Safety Issues, Functional Mobility    Impairments Affecting Function (Mobility) balance; endurance/activity tolerance; range of motion (ROM)  -           User Key  (r) = Recorded By, (t) = Taken By, (c) = Cosigned By    Initials Name Provider Type     Rafaela Garay, OTR Occupational Therapist                 Mobility/ADL's     Row Name 22 1542          Bed Mobility    Comment (Bed Mobility) NT UIC  -     Row Name 22 1542          Transfers    Comment (Transfers) pt declined wanted to stay in chair  -     Row Name 22 1542          Grooming  Assessment/Training    Comment (Grooming) already completed ADLs  -           User Key  (r) = Recorded By, (t) = Taken By, (c) = Cosigned By    Initials Name Provider Type    Rafaela Gann OTR Occupational Therapist               Obj/Interventions     Row Name 01/20/22 1542          Shoulder (Therapeutic Exercise)    Shoulder (Therapeutic Exercise) AROM (active range of motion); AAROM (active assistive range of motion)  -     Shoulder AROM (Therapeutic Exercise) left; flexion; extension; 10 repetitions; 2 sets; sitting  -     Shoulder AAROM (Therapeutic Exercise) right; flexion; extension; 10 repetitions  2 sets  -     Row Name 01/20/22 1542          Elbow/Forearm (Therapeutic Exercise)    Elbow/Forearm (Therapeutic Exercise) AROM (active range of motion)  -     Elbow/Forearm AROM (Therapeutic Exercise) flexion; extension; pronation; supination; bilateral; 10 repetitions; sitting; 2 sets  -[facility] Name 01/20/22 1542          Balance    Balance Assessment sitting static balance  -     Static Sitting Balance WFL; sitting in chair  -[facility] Name 01/20/22 1542          Therapeutic Exercise    Therapeutic Exercise elbow/forearm; shoulder  -           User Key  (r) = Recorded By, (t) = Taken By, (c) = Cosigned By    Initials Name Provider Type    Rafaela Gann OTR Occupational Therapist               Goals/Plan    No documentation.                Clinical Impression     [facility] Name 01/20/22 1543          Pain Scale: Numbers Pre/Post-Treatment    Pretreatment Pain Rating 0/10 - no pain  -     Posttreatment Pain Rating 0/10 - no pain  -     Row Name 01/20/22 1543          Plan of Care Review    Plan of Care Reviewed With patient  -     Progress no change  -     Outcome Summary pt completed AROM L UE and AAROM R shoulder R AROM R elbow/forearm ex 10x2 to incr strength and endurance. pt required VC during session for attention to task. cont OT to incr ADL, strength, balance,  and tsf.  -KP     Row Name 01/20/22 1543          Positioning and Restraints    Pre-Treatment Position sitting in chair/recliner  -KP     Post Treatment Position chair  -KP     In Chair reclined; call light within reach; encouraged to call for assist; exit alarm on  -KP           User Key  (r) = Recorded By, (t) = Taken By, (c) = Cosigned By    Initials Name Provider Type    Rafaela Gann OTR Occupational Therapist               Outcome Measures     Row Name 01/20/22 1544          How much help from another is currently needed...    Putting on and taking off regular lower body clothing? 1  -KP     Bathing (including washing, rinsing, and drying) 2  -KP     Toileting (which includes using toilet bed pan or urinal) 1  -KP     Putting on and taking off regular upper body clothing 2  -KP     Taking care of personal grooming (such as brushing teeth) 2  -KP     Eating meals 2  -KP     AM-PAC 6 Clicks Score (OT) 10  -KP     Row Name 01/20/22 1422          How much help from another person do you currently need...    Turning from your back to your side while in flat bed without using bedrails? 3  -PH     Moving from lying on back to sitting on the side of a flat bed without bedrails? 3  -PH     Moving to and from a bed to a chair (including a wheelchair)? 3  -PH     Standing up from a chair using your arms (e.g., wheelchair, bedside chair)? 3  -PH     Climbing 3-5 steps with a railing? 3  -PH     To walk in hospital room? 3  -PH     AM-PAC 6 Clicks Score (PT) 18  -PH     Row Name 01/20/22 1422          Functional Assessment    Outcome Measure Options AM-PAC 6 Clicks Basic Mobility (PT)  -PH           User Key  (r) = Recorded By, (t) = Taken By, (c) = Cosigned By    Initials Name Provider Type    Rafaela Gann OTR Occupational Therapist    Cyndie Mckeon PTA Physical Therapy Assistant                Occupational Therapy Education                 Title: PT OT SLP Therapies (In Progress)      Topic: Occupational Therapy (In Progress)     Point: ADL training (In Progress)     Description:   Instruct learner(s) on proper safety adaptation and remediation techniques during self care or transfers.   Instruct in proper use of assistive devices.              Learning Progress Summary           Patient Acceptance, E, NR,NL by  at 1/17/2022 1215    Comment: the role of OT                   Point: Home exercise program (Not Started)     Description:   Instruct learner(s) on appropriate technique for monitoring, assisting and/or progressing therapeutic exercises/activities.              Learner Progress:  Not documented in this visit.          Point: Precautions (Not Started)     Description:   Instruct learner(s) on prescribed precautions during self-care and functional transfers.              Learner Progress:  Not documented in this visit.          Point: Body mechanics (Not Started)     Description:   Instruct learner(s) on proper positioning and spine alignment during self-care, functional mobility activities and/or exercises.              Learner Progress:  Not documented in this visit.                      User Key     Initials Effective Dates Name Provider Type Discipline     01/05/21 -  Mary Bradford OT Occupational Therapist OT              OT Recommendation and Plan     Plan of Care Review  Plan of Care Reviewed With: patient  Progress: no change  Outcome Summary: pt completed AROM L UE and AAROM R shoulder R AROM R elbow/forearm ex 10x2 to incr strength and endurance. pt required VC during session for attention to task. cont OT to incr ADL, strength, balance, and tsf.     Time Calculation:    Time Calculation- OT     Row Name 01/20/22 1545             Time Calculation- OT    OT Start Time 1455  -KP      OT Stop Time 1506  -      OT Time Calculation (min) 11 min  -      Total Timed Code Minutes- OT 11 minute(s)  -      OT Received On 01/20/22  -      OT - Next Appointment 01/21/22   -KP              Timed Charges    52784 - OT Therapeutic Exercise Minutes 11  -KP              Total Minutes    Timed Charges Total Minutes 11  -KP       Total Minutes 11  -KP            User Key  (r) = Recorded By, (t) = Taken By, (c) = Cosigned By    Initials Name Provider Type    Rafaela Gann OTR Occupational Therapist              Therapy Charges for Today     Code Description Service Date Service Provider Modifiers Qty    67080403165 HC OT THER PROC EA 15 MIN 1/20/2022 Rafaela Garay OTR GO 1               MICHELLE Snyder  1/20/2022

## 2022-01-20 NOTE — PLAN OF CARE
Goal Outcome Evaluation:  Plan of Care Reviewed With: patient        Progress: no change  Outcome Summary: pt completed AROM L UE and AAROM R shoulder R AROM R elbow/forearm ex 10x2 to incr strength and endurance. pt required VC during session for attention to task. cont OT to incr ADL, strength, balance, and tsf.  OT wore all PPE, washed hands before/after. Pt wore mask.

## 2022-01-20 NOTE — DISCHARGE SUMMARY
Patient Name: Rogelio Sher  : 1945  MRN: [id number]    Date of Admission: 2022  Date of Discharge:  2022  Primary Care Physician: Provider, No Known      Chief Complaint:   Fall      Discharge Diagnoses     Active Hospital Problems    Diagnosis  POA   • **Parkinson's disease (HCC) [G20]  Yes   • GERD (gastroesophageal reflux disease) [K21.9]  Yes   • DNR (do not resuscitate) [Z66]  Yes   • Essential hypertension [I10]  Yes   • Generalized weakness [R53.1]  Yes      Resolved Hospital Problems   No resolved problems to display.        Hospital Course     Mr. Sher is a 76 y.o. male with a history of HTN, HLD, GERD, Parkinson's disease, kidney stone who presented to [facility] initially complaining of fall.  Please see the admitting history and physical for further details.  He was found to have generalized weakness most likely due to deconditioning & complications of recently undertreated Parkison's disease given reports of multiple missed doses and was admitted to the hospital for further evaluation and treatment.      Restarted agents for his Parkinson's disease & suspect missed doses contributing to more withdrawn and less active behavior.  No evidence of fracture or acute injury with exception of minimal eccyhmosis.  Also no evidence for infection or any significant electrolyte abnormalities.  Family involved in decision-making during hospital admission.  suspect mostly progression of the Parkinson's disease contributing to increased debilitated state aggravated by missing medication therapy.      Review of notes from documentation a couple of months ago indicate no evidence of acute abnormality of MRI brain at that time & remains non-focal on exam.  Neurology increased Sinemet to 4 times a daily at that time.  Family not interested in additional neurological evaluation this admission & prefer return to [place] with 24/7 caregivers until bed available at [facility]  at [place].  Patient medically stable for discharge on 1/19 & tolerating P.O. & minimal physical activity with assistance.     Day of Discharge       Physical Exam:  Temp:  [97.1 °F (36.2 °C)-98.7 °F (37.1 °C)] 97.2 °F (36.2 °C)  Heart Rate:  [61-87] 61  Resp:  [16-18] 18  BP: (101-153)/(59-82) 101/59  Body mass index is 27.78 kg/m².     Physical Exam   Constitutional:       General: He is not in acute distress.     Appearance: He is ill-appearing (chronic appearance). He is not toxic-appearing.      Comments: Alert, elderly, frail, generally weak   Cardiovascular:      Rate and Rhythm: Normal rate and regular rhythm.   Pulmonary:      Effort: Pulmonary effort is normal.      Comments: Diminished on expiration throughout all lung fields  Abdominal:      General: There is no distension.      Palpations: Abdomen is soft.      Tenderness: There is no abdominal tenderness.   Musculoskeletal:         General: No tenderness.      Right lower leg: No edema.      Left lower leg: No edema.   Skin:     General: Skin is warm and dry.   Psychiatric:      Comments: judgement inappropriate      Consultants     Consult Orders (all) (From admission, onward)     Start     Ordered              Procedures     * Surgery not found *      Imaging Results (All)     None            Pertinent Labs     Results from last 7 days   Lab Units 01/15/22  0815 01/14/22  1433   WBC 10*3/mm3 6.93 9.81   HEMOGLOBIN g/dL 13.6 13.2   PLATELETS 10*3/mm3 159 147     Results from last 7 days   Lab Units 01/20/22  0849 01/15/22  0815 01/14/22  1433   SODIUM mmol/L 140 144 143   POTASSIUM mmol/L 3.9 3.5 3.6   CHLORIDE mmol/L 105 110* 106   CO2 mmol/L 27.9 23.5 29.0   BUN mg/dL 13 14 19   CREATININE mg/dL 0.81 0.79 0.81   GLUCOSE mg/dL 94 86 101*   EGFR IF NONAFRICN AM mL/min/1.73 92 95 92     Results from last 7 days   Lab Units 01/14/22  1433   ALBUMIN g/dL 4.00   BILIRUBIN mg/dL 1.1   ALK PHOS U/L 99   AST (SGOT) U/L 74*   ALT (SGPT) U/L <5     Results  from last 7 days   Lab Units 01/20/22  0849 01/15/22  0815 01/14/22  1433   CALCIUM mg/dL 8.4* 8.3* 8.6   ALBUMIN g/dL  --   --  4.00               Invalid input(s): LDLCALC      Results from last 7 days   Lab Units 01/14/22  1435   COVID19  Not Detected       Test Results Pending at Discharge       Discharge Details        Discharge Medications      Changes to Medications      Instructions Start Date   carbidopa-levodopa  MG per tablet  Commonly known as: SINEMET  What changed:   · how much to take  · when to take this   1 tablet, Oral, Every 6 Hours Scheduled         Continue These Medications      Instructions Start Date   aspirin 81 MG EC tablet   81 mg, Oral, Daily      atorvastatin 20 MG tablet  Commonly known as: LIPITOR   20 mg, Oral, Daily      cholecalciferol 25 MCG (1000 UT) tablet  Commonly known as: VITAMIN D3   1,000 Units, Oral, Daily      memantine 10 MG tablet  Commonly known as: NAMENDA   10 mg, Oral, 2 Times Daily      multivitamin with minerals tablet tablet   1 tablet, Oral, Daily      omeprazole 20 MG capsule  Commonly known as: priLOSEC   20 mg, Oral, Daily      pramipexole 0.75 MG tablet  Commonly known as: MIRAPEX   0.7 mg, Oral, 3 Times Daily      rivastigmine 6 MG capsule  Commonly known as: EXELON   6 mg, Oral, 2 Times Daily      vitamin B-12 500 MCG tablet  Commonly known as: CYANOCOBALAMIN   500 mcg, Oral, Daily      vitamin C 500 MG tablet  Commonly known as: ASCORBIC ACID   500 mg, Oral, Daily             No Known Allergies    Discharge Disposition:  Skilled Nursing Facility (DC - External)      Discharge Diet:  Diet Order   Procedures   • Diet Regular; Cardiac       Discharge Activity:       CODE STATUS:    Code Status and Medical Interventions:   Ordered at: 01/15/22 1014     Medical Intervention Limits:    NO intubation (DNI)    NO cardioversion     Level Of Support Discussed With:    Health Care Surrogate     Code Status (Patient has no pulse and is not breathing):    No CPR  (Do Not Attempt to Resuscitate)     Medical Interventions (Patient has pulse or is breathing):    Limited Support     Comments:    discussed with family       No future appointments.   Follow-up Information     Provider, No Known .    Contact information:  [facility] [address] [phone]                         Time Spent on Discharge:  Greater than 30 minutes      ELIAZAR Bustamante  [facility]  01/20/22  12:36 EST

## 2022-01-20 NOTE — PLAN OF CARE
Goal Outcome Evaluation:  Plan of Care Reviewed With: patient        Progress: no change  Outcome Summary: Plans to dc patient to rehab tomorrow. Ambulance transport has been arranged for 0900 tomorrow. Pt will need his Sinemet prior to dc tomorrow. Raymon continue to monitor.

## 2022-01-20 NOTE — PROGRESS NOTES
Discharge Planning Assessment  [facility]     Patient Name: Rogelio Sher  MRN: [id number]  Today's Date: 1/20/2022    Admit Date: 1/14/2022     Discharge Needs Assessment    No documentation.                Discharge Plan     Row Name 01/20/22 1627       Plan    Plan [facility] rehab tomorrow; [facility] arranged for 9am; packet with ambulance note in cubby    Plan Comments Patient has a bed at [facility] rehab and will be transported by [facility] tomorrow at 0900. Daughter Susan Ferreira [phone] is agreeable with this discharge plan. Spoke with Lisa/Patsy she is aware patient will be transported by [facility] at 0900 at 1/21/2022. Packet in cub. Fred DRAPER              Continued Care and Services - Admitted Since 1/14/2022     Destination     Service Provider Request Status Selected Services Address Phone Fax Patient Preferred    [facility]  Accepted N/A [address] [facility] [address] [phone] --    [facility]  Accepted N/A [address] [facility] [address] [phone] --    [facility]  Pending - Request Sent N/A [address] [facility] [address] [phone] --    [facility]  Pending - Request Sent N/A [address] [address] [facility] [id number] [phone] --            Selected Continued Care - Prior Encounters Includes selections from prior encounters from 10/16/2021 to 1/20/2022    Discharged on 11/15/2021 Admission date: 11/10/2021 - Discharge disposition: Skilled Nursing Facility (DC - External)    Destination     Service Provider Selected Services Address Phone Fax Patient Preferred    [facility]  Skilled Nursing [address] [facility] [address] [phone] --                    Expected Discharge Date and Time     Expected Discharge Date Expected Discharge Time    Jan 20, 2022          Demographic  Summary    No documentation.                Functional Status    No documentation.                Psychosocial    No documentation.                Abuse/Neglect    No documentation.                Legal    No documentation.                Substance Abuse    No documentation.                Patient Forms    No documentation.                   Flory Bonilla RN

## 2022-01-20 NOTE — PLAN OF CARE
Goal Outcome Evaluation:  Plan of Care Reviewed With: patient, family        Progress: improving  Outcome Summary: Pt more alert and generally making improvements w/ PT although impulsive. Pt amb 400' req CGA and use of fww. Family member following w/ chair. Pt distracted by env and steering in direction he was looking. Pt also removing hands from fww  at times. pt educ a to keep hands on fww for safety. Pt returned to room and performed a few ther ex w/ vc/tc to in ROM. PT will prog as pt cuauhtemoc.    Pt unaware of maintaining personal safety req 24/7 assist.    Patient was intermittently wearing a face mask during this therapy encounter. Therapist used appropriate personal protective equipment including eye protection, mask, and gloves.  Mask used was standard procedure mask. Appropriate PPE was worn during the entire therapy session. Hand hygiene was completed before and after therapy session. Patient is not in enhanced droplet precautions.

## 2022-01-21 VITALS
WEIGHT: 204.8 LBS | BODY MASS INDEX: 27.74 KG/M2 | HEIGHT: 72 IN | TEMPERATURE: 97.1 F | OXYGEN SATURATION: 97 % | DIASTOLIC BLOOD PRESSURE: 77 MMHG | SYSTOLIC BLOOD PRESSURE: 135 MMHG | RESPIRATION RATE: 16 BRPM | HEART RATE: 66 BPM

## 2022-01-21 PROCEDURE — G0378 HOSPITAL OBSERVATION PER HR: HCPCS

## 2022-01-21 RX ADMIN — ATORVASTATIN CALCIUM 20 MG: 20 TABLET, FILM COATED ORAL at 07:57

## 2022-01-21 RX ADMIN — RIVASTIGMINE TARTRATE 6 MG: 1.5 CAPSULE ORAL at 07:56

## 2022-01-21 RX ADMIN — MULTIPLE VITAMINS W/ MINERALS TAB 1 TABLET: TAB at 07:56

## 2022-01-21 RX ADMIN — MEMANTINE HYDROCHLORIDE 10 MG: 10 TABLET, FILM COATED ORAL at 07:56

## 2022-01-21 RX ADMIN — ASPIRIN 81 MG: 81 TABLET, CHEWABLE ORAL at 07:56

## 2022-01-21 RX ADMIN — PRAMIPEXOLE DIHYDROCHLORIDE 0.75 MG: 0.5 TABLET ORAL at 07:56

## 2022-01-21 RX ADMIN — OXYCODONE HYDROCHLORIDE AND ACETAMINOPHEN 500 MG: 500 TABLET ORAL at 07:56

## 2022-01-21 RX ADMIN — Medication 500 MCG: at 07:56

## 2022-01-21 RX ADMIN — CARBIDOPA AND LEVODOPA 2 TABLET: 25; 100 TABLET ORAL at 07:56

## 2022-01-21 RX ADMIN — Medication 1000 UNITS: at 07:56

## 2022-01-21 NOTE — PROGRESS NOTES
Discharge Planning Assessment  [facility]     Patient Name: Rogelio Sher  MRN: [id number]  Today's Date: 1/21/2022    Admit Date: 1/14/2022     Discharge Needs Assessment    No documentation.                Discharge Plan     Row Name 01/21/22 0912       Plan    Plan [facility] skilled rehab today, [facility] to transport    Final Discharge Disposition Code 03 - skilled nursing facility (SNF)    Final Note Patient discharged and will be going to [facility] then transfer to [facility] for long term care when they have a bed available. Daughter requested patient be transported by EMS because the family does not feel comfortable transporting the patient. [facility] informed the daughter Medicare may not cover the cost of the Ambulance transport and the cost could be more than a 1,000 dollars. Daughter still wants to use EMS. Fred DRAPER              Continued Care and Services - Admitted Since 1/14/2022     Destination Coordination complete.    Service Provider Request Status Selected Services Address Phone Fax Patient Preferred    [facility]   Selected Skilled Nursing [address] [facility] [address] [phone] --    [facility]  Accepted N/A [address] [facility] [address] [phone] --    [facility]  Pending - Request Sent N/A [address] [facility] [address] [phone] --    [facility]  Pending - Request Sent N/A [address] [address] [facility] [id number] [phone] --            Selected Continued Care - Prior Encounters Includes selections from prior encounters from 10/16/2021 to 1/21/2022    Discharged on 11/15/2021 Admission date: 11/10/2021 - Discharge disposition: Skilled Nursing Facility (DC - External)    Destination     Service Provider Selected Services Address Phone Fax Patient Preferred    [facility]  Skilled Nursing [address],  [facility] [address] [phone] --                    Expected Discharge Date and Time     Expected Discharge Date Expected Discharge Time    Jan 20, 2022          Demographic Summary    No documentation.                Functional Status    No documentation.                Psychosocial    No documentation.                Abuse/Neglect    No documentation.                Legal    No documentation.                Substance Abuse    No documentation.                Patient Forms    No documentation.                   Flory Bonilla, RN

## 2022-03-02 NOTE — CASE MANAGEMENT/SOCIAL WORK
Continued Stay Note  [facility]     Patient Name: Rogelio Sher  MRN: [id number]  Today's Date: 1/18/2022    Admit Date: 1/14/2022     Discharge Plan     Row Name 01/18/22 1251       Plan    Plan [facility] accepted pending bed availability per Covid-19 preparedness.    Patient/Family in Agreement with Plan yes    Plan Comments Discharge plans unchanged. Plans dc to [facility] pending bed availability per Covid-19 preparedness. Call placed to Lisa/Patsy. States no beds available today. Received call from Susan/daughter checking on bed status. Informed her no beds available at this time but facility continues to follow......[facility]               Discharge Codes    No documentation.               Expected Discharge Date and Time     Expected Discharge Date Expected Discharge Time    Jan 19, 2022             Bridgett Hackett RN     details… detailed exam

## 2022-09-02 NOTE — PROGRESS NOTES
"Physicians Statement of Medical Necessity for  Ambulance Transportation    GENERAL INFORMATION     Name: Rogelio Sher  YOB: 1945  Medicare #: [id number]  Transport Date: 01/21/2022  Origin: [facility]  Destination: [facility]   Is the Patient's stay covered under Medicare Part A (PPS/DRG?)Yes  Closest appropriate facility? Yes  If this a hosp-hosp transfer? No  Is this a hospice patient? No    MEDICAL NECESSITY QUESTIONAIRE    Ambulance Transportation is medically necessary only if other means of transportation are contraindicated or would be potentially harmful to the patient.  To meet this requirement, the patient must be either \"bed confined\" or suffer from a condition such that transport by means other than an ambulance is contraindicated by the patient's condition.  The following questions must be answered by the healthcare professional signing below for this form to be valid:     1) Describe the MEDICAL CONDITION (physical and/or mental) of this patient AT THE TIME OF AMBULANCE TRANSPORT that requires the patient to be transported in an ambulance, and why transport by other means is contraindicated by the patient's condition:   Active Hospital Problems    Diagnosis    • **Parkinson's disease (HCC)    • GERD (gastroesophageal reflux disease)    • DNR (do not resuscitate)    • Essential hypertension    • Generalized weakness          Past Medical History:   Diagnosis Date   • Chorioretinitis     histoplasmosis left eye   • GERD (gastroesophageal reflux disease)    • Hyperlipidemia    • Hypertension    • Kidney stone    • Parkinson's disease (HCC)       Past Surgical History:   Procedure Laterality Date   • CATARACT EXTRACTION, BILATERAL     • COLONOSCOPY     • KIDNEY STONE SURGERY     • TONSILLECTOMY        2) Is this patient \"bed confined\" as defined below?Yes   To be \"bed confined\" the patient must satisfy all three of the following criteria:  (1) unable to get up from bed without " assistance; AND (2) unable to ambulate;  AND (3) unable to sit in a chair or wheelchair.  3) Can this patient safely be transported by car or wheelchair van (I.e., may safely sit during transport, without an attendant or monitoring?)No   4. In addition to completing questions 1-3 above, please check any of the following conditions that apply*:          *Note: supporting documentation for any boxes checked must be maintained in the patient's medical records Patient is confused, Medical attendant required and Unable to tolerate seated position for time needed to transport      SIGNATURE OF PHYSICIAN OR OTHER AUTHORIZED HEALTHCARE PROFESSIONAL    I certify that the above information is true and correct based on my evaluation of this patient, and represent that the patient requires transport by ambulance and that other forms of transport are contraindicated.  I understand that this information will be used by the Centers for Medicare and Medicaid Services (CMS) to support the determiniation of medical necessity for ambulance services, and I represent that I have personal knowledge of the patient's condition at the time of transport.       If this box is checked, I also certify that the patient is physically or mentally incapable of signing the ambulance service's claim form and that the institution with which I am affiliated has furnished care, services or assistance to the patient.  My signature below is made on behalf of the patient pursuant to 42 .36(b)(4). In accordance with 42 .37, the specific reason(s) that the patient is physically or mentally incapable of signing the claim for is as follows:     Signature of Physician or Healthcare Professional     Flory Bonilla RN     Date/Time:     1/21/2022    09:18 EST         (For Scheduled repetitive transport, this form is not valid for transports performed more than 60 days after this date).                                                                                                                                             --------------------------------------------------------------------------------------------  Printed Name and Credentials of Physician or Authorized Healthcare Professional     *Form must be signed by patient's attending physician for scheduled, repetitive transports,.  For non-repetitive ambulance transports, if unable to obtain the signature of the attending physician, any of the following may sign (please select below):     Physician  Clinical Nurse Specialist  Registered Nurse     Physician Assistant  Discharge Planner  Licensed Practical Nurse     Nurse Practitioner           English

## 2023-12-15 NOTE — PLAN OF CARE
Most recent thyroid function studies are normal. She is clinically euthyroid and biochemically euthyroid. We will continue the same levothyroxine 75 mcg daily. Problem: Patient Care Overview (Adult)  Goal: Plan of Care Review   02/03/18 8690   Coping/Psychosocial Response Interventions   Plan Of Care Reviewed With patient;daughter;family   Patient Care Overview   Progress improving   Outcome Evaluation   Outcome Summary/Follow up Plan VSS. Up with assist to urinate numerous times, some uinary incontinence this shift. IVFs dc's. Taking diet well. safety maintained. Continue to monitor.     Goal: Adult Individualization and Mutuality  Outcome: Ongoing (interventions implemented as appropriate)    Goal: Discharge Needs Assessment  Outcome: Ongoing (interventions implemented as appropriate)      Problem: Infection, Risk/Actual (Adult)  Goal: Infection Prevention/Resolution  Outcome: Ongoing (interventions implemented as appropriate)      Problem: Fall Risk (Adult)  Goal: Identify Related Risk Factors and Signs and Symptoms  Outcome: Ongoing (interventions implemented as appropriate)    Goal: Absence of Falls  Outcome: Ongoing (interventions implemented as appropriate)